# Patient Record
Sex: MALE | Race: WHITE | Employment: OTHER
[De-identification: names, ages, dates, MRNs, and addresses within clinical notes are randomized per-mention and may not be internally consistent; named-entity substitution may affect disease eponyms.]

---

## 2017-02-08 ENCOUNTER — OFFICE VISIT (OUTPATIENT)
Dept: FAMILY MEDICINE CLINIC | Facility: CLINIC | Age: 77
End: 2017-02-08

## 2017-02-08 VITALS
SYSTOLIC BLOOD PRESSURE: 120 MMHG | DIASTOLIC BLOOD PRESSURE: 68 MMHG | BODY MASS INDEX: 19.67 KG/M2 | HEART RATE: 75 BPM | WEIGHT: 145 LBS

## 2017-02-08 DIAGNOSIS — I10 ESSENTIAL HYPERTENSION: ICD-10-CM

## 2017-02-08 DIAGNOSIS — E11.9 TYPE 2 DIABETES MELLITUS WITHOUT COMPLICATION, WITHOUT LONG-TERM CURRENT USE OF INSULIN (HCC): Primary | ICD-10-CM

## 2017-02-08 PROCEDURE — 99213 OFFICE O/P EST LOW 20 MIN: CPT | Performed by: FAMILY MEDICINE

## 2017-02-08 ASSESSMENT — ENCOUNTER SYMPTOMS
COUGH: 0
CONSTIPATION: 0
ABDOMINAL PAIN: 0
BACK PAIN: 0
BLOOD IN STOOL: 0
WHEEZING: 0
DIARRHEA: 0
SHORTNESS OF BREATH: 0

## 2017-05-06 ENCOUNTER — HOSPITAL ENCOUNTER (OUTPATIENT)
Age: 77
Discharge: HOME | End: 2017-05-06
Payer: COMMERCIAL

## 2017-05-06 LAB — PROSTATE SPECIFIC ANTIGEN: 3.37 UG/L

## 2017-05-06 PROCEDURE — 84153 ASSAY OF PSA TOTAL: CPT

## 2017-05-06 PROCEDURE — 36415 COLL VENOUS BLD VENIPUNCTURE: CPT

## 2017-08-12 ENCOUNTER — HOSPITAL ENCOUNTER (OUTPATIENT)
Age: 77
Discharge: HOME | End: 2017-08-12
Payer: COMMERCIAL

## 2017-08-12 DIAGNOSIS — I10 ESSENTIAL HYPERTENSION: ICD-10-CM

## 2017-08-12 DIAGNOSIS — E11.9 TYPE 2 DIABETES MELLITUS WITHOUT COMPLICATION, WITHOUT LONG-TERM CURRENT USE OF INSULIN (HCC): ICD-10-CM

## 2017-08-12 LAB
ABSOLUTE EOS #: 0.1 K/UL (ref 0–0.4)
ABSOLUTE LYMPH #: 1.2 K/UL (ref 1–4.8)
ABSOLUTE MONO #: 0.5 K/UL (ref 0.2–0.8)
ALBUMIN SERPL-MCNC: 4.2 G/DL (ref 3.5–5.2)
ALBUMIN/GLOBULIN RATIO: ABNORMAL (ref 1–2.5)
ALP BLD-CCNC: 52 U/L (ref 40–129)
ALT SERPL-CCNC: 13 U/L (ref 5–41)
ANION GAP SERPL CALCULATED.3IONS-SCNC: 10 MMOL/L (ref 9–17)
AST SERPL-CCNC: 19 U/L
BASOPHILS # BLD: 1 %
BASOPHILS ABSOLUTE: 0 K/UL (ref 0–0.2)
BILIRUB SERPL-MCNC: 0.49 MG/DL (ref 0.3–1.2)
BUN BLDV-MCNC: 17 MG/DL (ref 8–23)
BUN/CREAT BLD: 16 (ref 9–20)
CALCIUM SERPL-MCNC: 9 MG/DL (ref 8.6–10.4)
CHLORIDE BLD-SCNC: 101 MMOL/L (ref 98–107)
CHOLESTEROL/HDL RATIO: 2.7
CHOLESTEROL: 188 MG/DL
CO2: 29 MMOL/L (ref 20–31)
CREAT SERPL-MCNC: 1.07 MG/DL (ref 0.7–1.2)
DIFFERENTIAL TYPE: NORMAL
EOSINOPHILS RELATIVE PERCENT: 1 %
GFR AFRICAN AMERICAN: >60 ML/MIN
GFR NON-AFRICAN AMERICAN: >60 ML/MIN
GFR SERPL CREATININE-BSD FRML MDRD: ABNORMAL ML/MIN/{1.73_M2}
GFR SERPL CREATININE-BSD FRML MDRD: ABNORMAL ML/MIN/{1.73_M2}
GLUCOSE BLD-MCNC: 169 MG/DL (ref 70–99)
HCT VFR BLD CALC: 45.9 % (ref 41–53)
HDLC SERPL-MCNC: 70 MG/DL
HEMOGLOBIN: 15.6 G/DL (ref 13.5–17.5)
LDL CHOLESTEROL: 95 MG/DL (ref 0–130)
LYMPHOCYTES # BLD: 20 %
MCH RBC QN AUTO: 31.4 PG (ref 26–34)
MCHC RBC AUTO-ENTMCNC: 33.9 G/DL (ref 31–37)
MCV RBC AUTO: 92.5 FL (ref 80–100)
MONOCYTES # BLD: 9 %
PDW BLD-RTO: 13.3 % (ref 11.5–14.5)
PLATELET # BLD: 237 K/UL (ref 130–400)
PLATELET ESTIMATE: NORMAL
PMV BLD AUTO: 7.9 FL (ref 6–12)
POTASSIUM SERPL-SCNC: 4.4 MMOL/L (ref 3.7–5.3)
RBC # BLD: 4.97 M/UL (ref 4.5–5.9)
RBC # BLD: NORMAL 10*6/UL
SEG NEUTROPHILS: 69 %
SEGMENTED NEUTROPHILS ABSOLUTE COUNT: 4.3 K/UL (ref 1.8–7.7)
SODIUM BLD-SCNC: 140 MMOL/L (ref 135–144)
TOTAL PROTEIN: 6.6 G/DL (ref 6.4–8.3)
TRIGL SERPL-MCNC: 117 MG/DL
VLDLC SERPL CALC-MCNC: NORMAL MG/DL (ref 1–30)
WBC # BLD: 6.2 K/UL (ref 3.5–11)
WBC # BLD: NORMAL 10*3/UL

## 2017-08-12 PROCEDURE — 80053 COMPREHEN METABOLIC PANEL: CPT

## 2017-08-12 PROCEDURE — 80061 LIPID PANEL: CPT

## 2017-08-12 PROCEDURE — 85025 COMPLETE CBC W/AUTO DIFF WBC: CPT

## 2017-08-12 PROCEDURE — 83036 HEMOGLOBIN GLYCOSYLATED A1C: CPT

## 2017-08-12 PROCEDURE — 36415 COLL VENOUS BLD VENIPUNCTURE: CPT

## 2017-08-13 LAB
ESTIMATED AVERAGE GLUCOSE: 171 MG/DL
HBA1C MFR BLD: 7.6 % (ref 4–6)

## 2017-08-18 ENCOUNTER — OFFICE VISIT (OUTPATIENT)
Dept: FAMILY MEDICINE CLINIC | Age: 77
End: 2017-08-18
Payer: COMMERCIAL

## 2017-08-18 VITALS
SYSTOLIC BLOOD PRESSURE: 120 MMHG | HEIGHT: 70 IN | BODY MASS INDEX: 20.9 KG/M2 | HEART RATE: 81 BPM | DIASTOLIC BLOOD PRESSURE: 60 MMHG | WEIGHT: 146 LBS

## 2017-08-18 DIAGNOSIS — Z12.5 PROSTATE CANCER SCREENING: ICD-10-CM

## 2017-08-18 DIAGNOSIS — I10 ESSENTIAL HYPERTENSION: ICD-10-CM

## 2017-08-18 DIAGNOSIS — E11.9 TYPE 2 DIABETES MELLITUS WITHOUT COMPLICATION, WITHOUT LONG-TERM CURRENT USE OF INSULIN (HCC): Primary | ICD-10-CM

## 2017-08-18 PROCEDURE — 99213 OFFICE O/P EST LOW 20 MIN: CPT | Performed by: FAMILY MEDICINE

## 2017-08-18 ASSESSMENT — PATIENT HEALTH QUESTIONNAIRE - PHQ9
1. LITTLE INTEREST OR PLEASURE IN DOING THINGS: 0
2. FEELING DOWN, DEPRESSED OR HOPELESS: 1
SUM OF ALL RESPONSES TO PHQ QUESTIONS 1-9: 1
SUM OF ALL RESPONSES TO PHQ9 QUESTIONS 1 & 2: 1

## 2017-08-18 ASSESSMENT — ENCOUNTER SYMPTOMS
COUGH: 0
WHEEZING: 0
ABDOMINAL PAIN: 0
SHORTNESS OF BREATH: 0
BLOOD IN STOOL: 0
BACK PAIN: 0
CONSTIPATION: 0
DIARRHEA: 0

## 2018-01-27 ENCOUNTER — HOSPITAL ENCOUNTER (OUTPATIENT)
Age: 78
Discharge: HOME | End: 2018-01-27
Payer: MEDICARE

## 2018-01-27 DIAGNOSIS — Z12.5 PROSTATE CANCER SCREENING: ICD-10-CM

## 2018-01-27 DIAGNOSIS — E11.9 TYPE 2 DIABETES MELLITUS WITHOUT COMPLICATION, WITHOUT LONG-TERM CURRENT USE OF INSULIN (HCC): ICD-10-CM

## 2018-01-27 DIAGNOSIS — I10 ESSENTIAL HYPERTENSION: ICD-10-CM

## 2018-01-27 LAB
ABSOLUTE EOS #: 0.1 K/UL (ref 0–0.4)
ABSOLUTE IMMATURE GRANULOCYTE: ABNORMAL K/UL (ref 0–0.3)
ABSOLUTE LYMPH #: 1.1 K/UL (ref 1–4.8)
ABSOLUTE MONO #: 0.6 K/UL (ref 0.2–0.8)
ALBUMIN SERPL-MCNC: 4.2 G/DL (ref 3.5–5.2)
ALBUMIN/GLOBULIN RATIO: ABNORMAL (ref 1–2.5)
ALP BLD-CCNC: 56 U/L (ref 40–129)
ALT SERPL-CCNC: 14 U/L (ref 5–41)
ANION GAP SERPL CALCULATED.3IONS-SCNC: 10 MMOL/L (ref 9–17)
AST SERPL-CCNC: 21 U/L
BASOPHILS # BLD: 1 % (ref 0–2)
BASOPHILS ABSOLUTE: 0 K/UL (ref 0–0.2)
BILIRUB SERPL-MCNC: 0.45 MG/DL (ref 0.3–1.2)
BUN BLDV-MCNC: 17 MG/DL (ref 8–23)
BUN/CREAT BLD: 17 (ref 9–20)
CALCIUM SERPL-MCNC: 8.9 MG/DL (ref 8.6–10.4)
CHLORIDE BLD-SCNC: 101 MMOL/L (ref 98–107)
CHOLESTEROL/HDL RATIO: 2.5
CHOLESTEROL: 173 MG/DL
CO2: 28 MMOL/L (ref 20–31)
CREAT SERPL-MCNC: 0.99 MG/DL (ref 0.7–1.2)
DIFFERENTIAL TYPE: ABNORMAL
EOSINOPHILS RELATIVE PERCENT: 1 % (ref 1–4)
GFR AFRICAN AMERICAN: >60 ML/MIN
GFR NON-AFRICAN AMERICAN: >60 ML/MIN
GFR SERPL CREATININE-BSD FRML MDRD: ABNORMAL ML/MIN/{1.73_M2}
GFR SERPL CREATININE-BSD FRML MDRD: ABNORMAL ML/MIN/{1.73_M2}
GLUCOSE BLD-MCNC: 167 MG/DL (ref 70–99)
HCT VFR BLD CALC: 45.1 % (ref 41–53)
HDLC SERPL-MCNC: 69 MG/DL
HEMOGLOBIN: 14.9 G/DL (ref 13.5–17.5)
IMMATURE GRANULOCYTES: ABNORMAL %
LDL CHOLESTEROL: 84 MG/DL (ref 0–130)
LYMPHOCYTES # BLD: 17 % (ref 24–44)
MCH RBC QN AUTO: 31 PG (ref 26–34)
MCHC RBC AUTO-ENTMCNC: 33.1 G/DL (ref 31–37)
MCV RBC AUTO: 93.8 FL (ref 80–100)
MONOCYTES # BLD: 10 % (ref 1–7)
NRBC AUTOMATED: ABNORMAL PER 100 WBC
PDW BLD-RTO: 13.5 % (ref 11.5–14.5)
PLATELET # BLD: 264 K/UL (ref 130–400)
PLATELET ESTIMATE: ABNORMAL
PMV BLD AUTO: 7.9 FL (ref 6–12)
POTASSIUM SERPL-SCNC: 4.6 MMOL/L (ref 3.7–5.3)
PROSTATE SPECIFIC ANTIGEN: 3.49 UG/L
RBC # BLD: 4.81 M/UL (ref 4.5–5.9)
RBC # BLD: ABNORMAL 10*6/UL
SEG NEUTROPHILS: 71 % (ref 36–66)
SEGMENTED NEUTROPHILS ABSOLUTE COUNT: 4.6 K/UL (ref 1.8–7.7)
SODIUM BLD-SCNC: 139 MMOL/L (ref 135–144)
TOTAL PROTEIN: 6.6 G/DL (ref 6.4–8.3)
TRIGL SERPL-MCNC: 101 MG/DL
VLDLC SERPL CALC-MCNC: NORMAL MG/DL (ref 1–30)
WBC # BLD: 6.4 K/UL (ref 3.5–11)
WBC # BLD: ABNORMAL 10*3/UL

## 2018-01-27 PROCEDURE — 80061 LIPID PANEL: CPT

## 2018-01-27 PROCEDURE — 80053 COMPREHEN METABOLIC PANEL: CPT

## 2018-01-27 PROCEDURE — 83036 HEMOGLOBIN GLYCOSYLATED A1C: CPT

## 2018-01-27 PROCEDURE — 85025 COMPLETE CBC W/AUTO DIFF WBC: CPT

## 2018-01-27 PROCEDURE — 36415 COLL VENOUS BLD VENIPUNCTURE: CPT

## 2018-01-27 PROCEDURE — G0103 PSA SCREENING: HCPCS

## 2018-01-28 LAB
ESTIMATED AVERAGE GLUCOSE: 174 MG/DL
HBA1C MFR BLD: 7.7 % (ref 4–6)

## 2018-02-15 ENCOUNTER — OFFICE VISIT (OUTPATIENT)
Dept: FAMILY MEDICINE CLINIC | Age: 78
End: 2018-02-15
Payer: MEDICARE

## 2018-02-15 VITALS
BODY MASS INDEX: 21.29 KG/M2 | HEART RATE: 72 BPM | WEIGHT: 148.4 LBS | SYSTOLIC BLOOD PRESSURE: 114 MMHG | DIASTOLIC BLOOD PRESSURE: 76 MMHG

## 2018-02-15 DIAGNOSIS — I10 ESSENTIAL HYPERTENSION: ICD-10-CM

## 2018-02-15 DIAGNOSIS — Z23 IMMUNIZATION DUE: ICD-10-CM

## 2018-02-15 DIAGNOSIS — E11.9 TYPE 2 DIABETES MELLITUS WITHOUT COMPLICATION, WITHOUT LONG-TERM CURRENT USE OF INSULIN (HCC): Primary | ICD-10-CM

## 2018-02-15 PROCEDURE — 3288F FALL RISK ASSESSMENT DOCD: CPT | Performed by: FAMILY MEDICINE

## 2018-02-15 PROCEDURE — 99213 OFFICE O/P EST LOW 20 MIN: CPT | Performed by: FAMILY MEDICINE

## 2018-02-15 PROCEDURE — 90688 IIV4 VACCINE SPLT 0.5 ML IM: CPT | Performed by: FAMILY MEDICINE

## 2018-02-15 PROCEDURE — G0008 ADMIN INFLUENZA VIRUS VAC: HCPCS | Performed by: FAMILY MEDICINE

## 2018-02-15 ASSESSMENT — ENCOUNTER SYMPTOMS
CONSTIPATION: 0
ABDOMINAL PAIN: 0
WHEEZING: 0
DIARRHEA: 0
BACK PAIN: 0
COUGH: 0
SHORTNESS OF BREATH: 0
BLOOD IN STOOL: 0

## 2018-06-16 DIAGNOSIS — E11.9 TYPE 2 DIABETES MELLITUS WITHOUT COMPLICATION, WITHOUT LONG-TERM CURRENT USE OF INSULIN (HCC): ICD-10-CM

## 2018-08-11 ENCOUNTER — HOSPITAL ENCOUNTER (OUTPATIENT)
Age: 78
Discharge: HOME | End: 2018-08-11
Payer: MEDICARE

## 2018-08-11 DIAGNOSIS — E11.9 TYPE 2 DIABETES MELLITUS WITHOUT COMPLICATION, WITHOUT LONG-TERM CURRENT USE OF INSULIN (HCC): ICD-10-CM

## 2018-08-11 DIAGNOSIS — I10 ESSENTIAL HYPERTENSION: ICD-10-CM

## 2018-08-11 LAB
ABSOLUTE EOS #: 0 K/UL (ref 0–0.4)
ABSOLUTE IMMATURE GRANULOCYTE: ABNORMAL K/UL (ref 0–0.3)
ABSOLUTE LYMPH #: 1 K/UL (ref 1–4.8)
ABSOLUTE MONO #: 0.5 K/UL (ref 0.2–0.8)
ALBUMIN SERPL-MCNC: 4.1 G/DL (ref 3.5–5.2)
ALBUMIN/GLOBULIN RATIO: ABNORMAL (ref 1–2.5)
ALP BLD-CCNC: 57 U/L (ref 40–129)
ALT SERPL-CCNC: 14 U/L (ref 5–41)
ANION GAP SERPL CALCULATED.3IONS-SCNC: 10 MMOL/L (ref 9–17)
AST SERPL-CCNC: 20 U/L
BASOPHILS # BLD: 1 % (ref 0–2)
BASOPHILS ABSOLUTE: 0.1 K/UL (ref 0–0.2)
BILIRUB SERPL-MCNC: 0.42 MG/DL (ref 0.3–1.2)
BUN BLDV-MCNC: 16 MG/DL (ref 8–23)
BUN/CREAT BLD: 17 (ref 9–20)
CALCIUM SERPL-MCNC: 8.8 MG/DL (ref 8.6–10.4)
CHLORIDE BLD-SCNC: 101 MMOL/L (ref 98–107)
CHOLESTEROL/HDL RATIO: 2.9
CHOLESTEROL: 182 MG/DL
CO2: 27 MMOL/L (ref 20–31)
CREAT SERPL-MCNC: 0.96 MG/DL (ref 0.7–1.2)
DIFFERENTIAL TYPE: ABNORMAL
EOSINOPHILS RELATIVE PERCENT: 1 % (ref 1–4)
GFR AFRICAN AMERICAN: >60 ML/MIN
GFR NON-AFRICAN AMERICAN: >60 ML/MIN
GFR SERPL CREATININE-BSD FRML MDRD: ABNORMAL ML/MIN/{1.73_M2}
GFR SERPL CREATININE-BSD FRML MDRD: ABNORMAL ML/MIN/{1.73_M2}
GLUCOSE BLD-MCNC: 155 MG/DL (ref 70–99)
HCT VFR BLD CALC: 46 % (ref 41–53)
HDLC SERPL-MCNC: 62 MG/DL
HEMOGLOBIN: 15.2 G/DL (ref 13.5–17.5)
IMMATURE GRANULOCYTES: ABNORMAL %
LDL CHOLESTEROL: 99 MG/DL (ref 0–130)
LYMPHOCYTES # BLD: 17 % (ref 24–44)
MCH RBC QN AUTO: 31.2 PG (ref 26–34)
MCHC RBC AUTO-ENTMCNC: 33 G/DL (ref 31–37)
MCV RBC AUTO: 94.4 FL (ref 80–100)
MONOCYTES # BLD: 8 % (ref 1–7)
NRBC AUTOMATED: ABNORMAL PER 100 WBC
PDW BLD-RTO: 13.3 % (ref 11.5–14.5)
PLATELET # BLD: 265 K/UL (ref 130–400)
PLATELET ESTIMATE: ABNORMAL
PMV BLD AUTO: 7.7 FL (ref 6–12)
POTASSIUM SERPL-SCNC: 4.3 MMOL/L (ref 3.7–5.3)
RBC # BLD: 4.88 M/UL (ref 4.5–5.9)
RBC # BLD: ABNORMAL 10*6/UL
SEG NEUTROPHILS: 73 % (ref 36–66)
SEGMENTED NEUTROPHILS ABSOLUTE COUNT: 4.2 K/UL (ref 1.8–7.7)
SODIUM BLD-SCNC: 138 MMOL/L (ref 135–144)
TOTAL PROTEIN: 6.4 G/DL (ref 6.4–8.3)
TRIGL SERPL-MCNC: 103 MG/DL
VLDLC SERPL CALC-MCNC: NORMAL MG/DL (ref 1–30)
WBC # BLD: 5.8 K/UL (ref 3.5–11)
WBC # BLD: ABNORMAL 10*3/UL

## 2018-08-11 PROCEDURE — 83036 HEMOGLOBIN GLYCOSYLATED A1C: CPT

## 2018-08-11 PROCEDURE — 36415 COLL VENOUS BLD VENIPUNCTURE: CPT

## 2018-08-11 PROCEDURE — 80053 COMPREHEN METABOLIC PANEL: CPT

## 2018-08-11 PROCEDURE — 85025 COMPLETE CBC W/AUTO DIFF WBC: CPT

## 2018-08-11 PROCEDURE — 80061 LIPID PANEL: CPT

## 2018-08-12 LAB
ESTIMATED AVERAGE GLUCOSE: 169 MG/DL
HBA1C MFR BLD: 7.5 % (ref 4–6)

## 2018-08-16 ENCOUNTER — OFFICE VISIT (OUTPATIENT)
Dept: FAMILY MEDICINE CLINIC | Age: 78
End: 2018-08-16
Payer: MEDICARE

## 2018-08-16 VITALS
BODY MASS INDEX: 20.37 KG/M2 | WEIGHT: 142 LBS | SYSTOLIC BLOOD PRESSURE: 136 MMHG | HEART RATE: 76 BPM | DIASTOLIC BLOOD PRESSURE: 70 MMHG

## 2018-08-16 DIAGNOSIS — Z12.5 PROSTATE CANCER SCREENING: ICD-10-CM

## 2018-08-16 DIAGNOSIS — E11.9 TYPE 2 DIABETES MELLITUS WITHOUT COMPLICATION, WITHOUT LONG-TERM CURRENT USE OF INSULIN (HCC): Primary | ICD-10-CM

## 2018-08-16 DIAGNOSIS — I10 ESSENTIAL HYPERTENSION: ICD-10-CM

## 2018-08-16 PROCEDURE — 99213 OFFICE O/P EST LOW 20 MIN: CPT | Performed by: FAMILY MEDICINE

## 2018-08-16 ASSESSMENT — ENCOUNTER SYMPTOMS
DIARRHEA: 0
WHEEZING: 0
COUGH: 0
BACK PAIN: 0
CONSTIPATION: 0
BLOOD IN STOOL: 0
SHORTNESS OF BREATH: 0
ABDOMINAL PAIN: 0

## 2018-08-16 NOTE — PROGRESS NOTES
Armaan Heard is a 70 y.o. male who presents today for his medical conditions/complaints as noted below. Armaan Heard is c/o of Diabetes; Health Maintenance (pneumonia); and Discuss Labs  Routine follow up on PL his labs remain stable. Has had some pain in his left hip but this pain is better today. He continues to smoke. HPI:     Diabetic visit information    BP Readings from Last 3 Encounters:   08/16/18 136/70   02/15/18 114/76   08/18/17 120/60       Hemoglobin A1C (%)   Date Value   08/11/2018 7.5 (H)   01/27/2018 7.7 (H)   08/12/2017 7.6 (H)     LDL Cholesterol (mg/dL)   Date Value   08/11/2018 99               Have you changed or started any medications since your last visit including any over-the-counter medicines, vitamins, or herbal medicines? no   Have you stopped taking any of your medications? Is so, why? -  no  Are you having any side effects from any of your medications? - no    Have you seen any other physician or provider since your last visit?  no   Have you had any other diagnostic tests since your last visit?  no   Have you been seen in the emergency room and/or had an admission in a hospital since we last saw you?  no     Have you had your annual diabetic retinal (eye) exam? No   (ensure copy of exam is in the chart)    Have you had your routine dental cleaning in the past 6 months? no    Do you have an active MyChart account? If not, what are your barriers? No:     Patient Care Team:  Eddi Johns MD as PCP - General (Family Medicine)  Eddi Johns MD as PCP - S Attributed Provider    Medical history Review  Past Medical, Family, and Social History reviewed and  contribute to the patient presenting condition.     Health Maintenance   Topic Date Due    DTaP/Tdap/Td vaccine (1 - Tdap) 01/17/1961    Shingles Vaccine (1 of 2 - 2 Dose Series) 01/17/1992    Pneumococcal low/med risk (1 of 2 - PCV13) 01/17/2007    Flu vaccine (1) 09/01/2018    Potassium monitoring 08/11/2019    Creatinine monitoring  08/11/2019       No past medical history on file. Past Surgical History:   Procedure Laterality Date    EYE SURGERY       Family History   Problem Relation Age of Onset    High Blood Pressure Mother     Heart Disease Mother     Kidney Disease Mother     Other Sister         obesity     Social History   Substance Use Topics    Smoking status: Current Every Day Smoker     Types: Cigarettes    Smokeless tobacco: Never Used    Alcohol use No      Current Outpatient Prescriptions   Medication Sig Dispense Refill    metFORMIN (GLUCOPHAGE) 500 MG tablet Take 1 tablet by mouth 2 times daily (with meals) 60 tablet 11    metFORMIN (GLUCOPHAGE) 500 MG tablet TAKE ONE TABLET BY MOUTH TWICE A DAY WITH MEALS 180 tablet 10    latanoprost (XALATAN) 0.005 % ophthalmic solution Administer 1 drop to the right eye nightly. No current facility-administered medications for this visit. No Known Allergies      Subjective:   Review of Systems   Constitutional: Negative for chills, diaphoresis, fatigue and fever. HENT: Negative for congestion and hearing loss. Eyes: Positive for visual disturbance. Respiratory: Negative for cough, shortness of breath and wheezing. Cardiovascular: Negative for chest pain, palpitations and leg swelling. Gastrointestinal: Negative for abdominal pain, blood in stool, constipation and diarrhea. Genitourinary: Negative for dysuria. Musculoskeletal: Positive for arthralgias and gait problem. Negative for back pain and neck pain. Skin: Negative for rash. Neurological: Negative for weakness, numbness and headaches. Psychiatric/Behavioral: Negative for dysphoric mood and sleep disturbance. Objective:   /70   Pulse 76   Wt 142 lb (64.4 kg)   BMI 20.37 kg/m²     Physical Exam   Constitutional: He is oriented to person, place, and time. He appears well-developed and well-nourished. No distress.    HENT:   Head: Normocephalic and atraumatic. Mouth/Throat: No oropharyngeal exudate. Eyes: Right eye exhibits no discharge. Left eye exhibits no discharge. No scleral icterus. Neck: Neck supple. Carotid bruit is not present. No thyromegaly present. Cardiovascular: Normal rate, regular rhythm and normal heart sounds. Exam reveals no gallop and no friction rub. No murmur heard. Pulmonary/Chest: Breath sounds normal. No respiratory distress. He has no wheezes. He has no rales. He exhibits no tenderness. Abdominal: There is no tenderness. Musculoskeletal: He exhibits no edema or tenderness. Lymphadenopathy:     He has no cervical adenopathy. Neurological: He is alert and oriented to person, place, and time. No cranial nerve deficit. Coordination normal.   Skin: No rash noted. He is not diaphoretic. Psychiatric: He has a normal mood and affect. His behavior is normal. Judgment and thought content normal.       Assessment:       Diagnosis Orders   1. Type 2 diabetes mellitus without complication, without long-term current use of insulin ([facility])  Comprehensive Metabolic Panel    Lipid Panel    Hemoglobin A1C    metFORMIN (GLUCOPHAGE) 500 MG tablet   2. Essential hypertension  CBC Auto Differential    Comprehensive Metabolic Panel    Lipid Panel   3. Prostate cancer screening  Psa screening         Plan:      No Follow-up on file.     Orders Placed This Encounter   Procedures    CBC Auto Differential     Standing Status:   Future     Standing Expiration Date:   8/16/2019    Comprehensive Metabolic Panel     Standing Status:   Future     Standing Expiration Date:   8/16/2019    Lipid Panel     Standing Status:   Future     Standing Expiration Date:   8/16/2019     Order Specific Question:   Is Patient Fasting?/# of Hours     Answer:   12 hour fast    Hemoglobin A1C     Standing Status:   Future     Standing Expiration Date:   8/16/2019    Psa screening     Standing Status:   Future     Standing Expiration Date:   8/16/2019

## 2019-02-21 ENCOUNTER — HOSPITAL ENCOUNTER (OUTPATIENT)
Age: 79
Discharge: HOME | End: 2019-02-21
Payer: MEDICARE

## 2019-02-21 DIAGNOSIS — Z12.5 PROSTATE CANCER SCREENING: ICD-10-CM

## 2019-02-21 DIAGNOSIS — I10 ESSENTIAL HYPERTENSION: ICD-10-CM

## 2019-02-21 DIAGNOSIS — E11.9 TYPE 2 DIABETES MELLITUS WITHOUT COMPLICATION, WITHOUT LONG-TERM CURRENT USE OF INSULIN (HCC): ICD-10-CM

## 2019-02-21 LAB
ABSOLUTE EOS #: <0.03 K/UL (ref 0–0.44)
ABSOLUTE IMMATURE GRANULOCYTE: 0.04 K/UL (ref 0–0.3)
ABSOLUTE LYMPH #: 1.01 K/UL (ref 1.1–3.7)
ABSOLUTE MONO #: 0.91 K/UL (ref 0.1–1.2)
ALBUMIN SERPL-MCNC: 3.7 G/DL (ref 3.5–5.2)
ALBUMIN/GLOBULIN RATIO: 1.5 (ref 1–2.5)
ALP BLD-CCNC: 70 U/L (ref 40–129)
ALT SERPL-CCNC: 36 U/L (ref 5–41)
ANION GAP SERPL CALCULATED.3IONS-SCNC: 12 MMOL/L (ref 9–17)
AST SERPL-CCNC: 34 U/L
BASOPHILS # BLD: 1 % (ref 0–2)
BASOPHILS ABSOLUTE: 0.04 K/UL (ref 0–0.2)
BILIRUB SERPL-MCNC: 0.81 MG/DL (ref 0.3–1.2)
BUN BLDV-MCNC: 24 MG/DL (ref 8–23)
BUN/CREAT BLD: ABNORMAL (ref 9–20)
CALCIUM SERPL-MCNC: 8.9 MG/DL (ref 8.6–10.4)
CHLORIDE BLD-SCNC: 103 MMOL/L (ref 98–107)
CHOLESTEROL/HDL RATIO: 2.3
CHOLESTEROL: 140 MG/DL
CO2: 25 MMOL/L (ref 20–31)
CREAT SERPL-MCNC: 0.8 MG/DL (ref 0.7–1.2)
DIFFERENTIAL TYPE: ABNORMAL
EOSINOPHILS RELATIVE PERCENT: 0 % (ref 1–4)
ESTIMATED AVERAGE GLUCOSE: 169 MG/DL
GFR AFRICAN AMERICAN: >60 ML/MIN
GFR NON-AFRICAN AMERICAN: >60 ML/MIN
GFR SERPL CREATININE-BSD FRML MDRD: ABNORMAL ML/MIN/{1.73_M2}
GFR SERPL CREATININE-BSD FRML MDRD: ABNORMAL ML/MIN/{1.73_M2}
GLUCOSE BLD-MCNC: 183 MG/DL (ref 70–99)
HBA1C MFR BLD: 7.5 % (ref 4–6)
HCT VFR BLD CALC: 38 % (ref 40.7–50.3)
HDLC SERPL-MCNC: 60 MG/DL
HEMOGLOBIN: 12.6 G/DL (ref 13–17)
IMMATURE GRANULOCYTES: 1 %
LDL CHOLESTEROL: 66 MG/DL (ref 0–130)
LYMPHOCYTES # BLD: 12 % (ref 24–43)
MCH RBC QN AUTO: 31.7 PG (ref 25.2–33.5)
MCHC RBC AUTO-ENTMCNC: 33.2 G/DL (ref 28.4–34.8)
MCV RBC AUTO: 95.5 FL (ref 82.6–102.9)
MONOCYTES # BLD: 11 % (ref 3–12)
NRBC AUTOMATED: 0 PER 100 WBC
PDW BLD-RTO: 12.9 % (ref 11.8–14.4)
PLATELET # BLD: 271 K/UL (ref 138–453)
PLATELET ESTIMATE: ABNORMAL
PMV BLD AUTO: 10.3 FL (ref 8.1–13.5)
POTASSIUM SERPL-SCNC: 4.1 MMOL/L (ref 3.7–5.3)
PROSTATE SPECIFIC ANTIGEN: 9.11 UG/L
RBC # BLD: 3.98 M/UL (ref 4.21–5.77)
RBC # BLD: ABNORMAL 10*6/UL
SEG NEUTROPHILS: 75 % (ref 36–65)
SEGMENTED NEUTROPHILS ABSOLUTE COUNT: 6.3 K/UL (ref 1.5–8.1)
SODIUM BLD-SCNC: 140 MMOL/L (ref 135–144)
TOTAL PROTEIN: 6.1 G/DL (ref 6.4–8.3)
TRIGL SERPL-MCNC: 69 MG/DL
VLDLC SERPL CALC-MCNC: NORMAL MG/DL (ref 1–30)
WBC # BLD: 8.3 K/UL (ref 3.5–11.3)
WBC # BLD: ABNORMAL 10*3/UL

## 2019-02-21 PROCEDURE — 85025 COMPLETE CBC W/AUTO DIFF WBC: CPT

## 2019-02-21 PROCEDURE — 80053 COMPREHEN METABOLIC PANEL: CPT

## 2019-02-21 PROCEDURE — 36415 COLL VENOUS BLD VENIPUNCTURE: CPT

## 2019-02-21 PROCEDURE — G0103 PSA SCREENING: HCPCS

## 2019-02-21 PROCEDURE — 83036 HEMOGLOBIN GLYCOSYLATED A1C: CPT

## 2019-02-21 PROCEDURE — 80061 LIPID PANEL: CPT

## 2019-03-06 ENCOUNTER — OFFICE VISIT (OUTPATIENT)
Dept: FAMILY MEDICINE CLINIC | Age: 79
End: 2019-03-06
Payer: MEDICARE

## 2019-03-06 VITALS
SYSTOLIC BLOOD PRESSURE: 120 MMHG | WEIGHT: 133 LBS | HEART RATE: 87 BPM | BODY MASS INDEX: 19.08 KG/M2 | OXYGEN SATURATION: 96 % | DIASTOLIC BLOOD PRESSURE: 70 MMHG

## 2019-03-06 DIAGNOSIS — E11.9 TYPE 2 DIABETES MELLITUS WITHOUT COMPLICATION, WITHOUT LONG-TERM CURRENT USE OF INSULIN (HCC): ICD-10-CM

## 2019-03-06 DIAGNOSIS — R97.20 ELEVATED PSA: Primary | ICD-10-CM

## 2019-03-06 PROCEDURE — 99213 OFFICE O/P EST LOW 20 MIN: CPT | Performed by: FAMILY MEDICINE

## 2019-03-06 ASSESSMENT — ENCOUNTER SYMPTOMS
WHEEZING: 0
CONSTIPATION: 0
BLOOD IN STOOL: 0
ABDOMINAL PAIN: 0
DIARRHEA: 0
COUGH: 0
SHORTNESS OF BREATH: 0
BACK PAIN: 0

## 2019-03-06 ASSESSMENT — PATIENT HEALTH QUESTIONNAIRE - PHQ9
2. FEELING DOWN, DEPRESSED OR HOPELESS: 1
SUM OF ALL RESPONSES TO PHQ QUESTIONS 1-9: 2
SUM OF ALL RESPONSES TO PHQ QUESTIONS 1-9: 2
SUM OF ALL RESPONSES TO PHQ9 QUESTIONS 1 & 2: 2
1. LITTLE INTEREST OR PLEASURE IN DOING THINGS: 1

## 2019-05-24 ENCOUNTER — HOSPITAL ENCOUNTER (OUTPATIENT)
Age: 79
Discharge: HOME | End: 2019-05-24
Payer: MEDICARE

## 2019-05-24 LAB — PROSTATE SPECIFIC ANTIGEN: 5.01 UG/L

## 2019-05-24 PROCEDURE — 84153 ASSAY OF PSA TOTAL: CPT

## 2019-05-24 PROCEDURE — 36415 COLL VENOUS BLD VENIPUNCTURE: CPT

## 2019-07-17 DIAGNOSIS — E11.9 TYPE 2 DIABETES MELLITUS WITHOUT COMPLICATION, WITHOUT LONG-TERM CURRENT USE OF INSULIN (HCC): ICD-10-CM

## 2019-09-13 ENCOUNTER — HOSPITAL ENCOUNTER (OUTPATIENT)
Age: 79
Discharge: HOME | End: 2019-09-13
Payer: MEDICARE

## 2019-09-13 DIAGNOSIS — E11.9 TYPE 2 DIABETES MELLITUS WITHOUT COMPLICATION, WITHOUT LONG-TERM CURRENT USE OF INSULIN (HCC): ICD-10-CM

## 2019-09-13 LAB
ABSOLUTE EOS #: 0.25 K/UL (ref 0–0.44)
ABSOLUTE IMMATURE GRANULOCYTE: 0.04 K/UL (ref 0–0.3)
ABSOLUTE LYMPH #: 1.14 K/UL (ref 1.1–3.7)
ABSOLUTE MONO #: 0.64 K/UL (ref 0.1–1.2)
ALBUMIN SERPL-MCNC: 4.1 G/DL (ref 3.5–5.2)
ALBUMIN/GLOBULIN RATIO: 1.8 (ref 1–2.5)
ALP BLD-CCNC: 56 U/L (ref 40–129)
ALT SERPL-CCNC: 13 U/L (ref 5–41)
ANION GAP SERPL CALCULATED.3IONS-SCNC: 11 MMOL/L (ref 9–17)
AST SERPL-CCNC: 19 U/L
BASOPHILS # BLD: 1 % (ref 0–2)
BASOPHILS ABSOLUTE: 0.06 K/UL (ref 0–0.2)
BILIRUB SERPL-MCNC: 0.45 MG/DL (ref 0.3–1.2)
BUN BLDV-MCNC: 26 MG/DL (ref 8–23)
BUN/CREAT BLD: ABNORMAL (ref 9–20)
CALCIUM SERPL-MCNC: 8.9 MG/DL (ref 8.6–10.4)
CHLORIDE BLD-SCNC: 102 MMOL/L (ref 98–107)
CHOLESTEROL/HDL RATIO: 2.6
CHOLESTEROL: 181 MG/DL
CO2: 26 MMOL/L (ref 20–31)
CREAT SERPL-MCNC: 0.88 MG/DL (ref 0.7–1.2)
DIFFERENTIAL TYPE: ABNORMAL
EOSINOPHILS RELATIVE PERCENT: 4 % (ref 1–4)
ESTIMATED AVERAGE GLUCOSE: 163 MG/DL
GFR AFRICAN AMERICAN: >60 ML/MIN
GFR NON-AFRICAN AMERICAN: >60 ML/MIN
GFR SERPL CREATININE-BSD FRML MDRD: ABNORMAL ML/MIN/{1.73_M2}
GFR SERPL CREATININE-BSD FRML MDRD: ABNORMAL ML/MIN/{1.73_M2}
GLUCOSE BLD-MCNC: 155 MG/DL (ref 70–99)
HBA1C MFR BLD: 7.3 % (ref 4–6)
HCT VFR BLD CALC: 42.9 % (ref 40.7–50.3)
HDLC SERPL-MCNC: 69 MG/DL
HEMOGLOBIN: 13.6 G/DL (ref 13–17)
IMMATURE GRANULOCYTES: 1 %
LDL CHOLESTEROL: 82 MG/DL (ref 0–130)
LYMPHOCYTES # BLD: 19 % (ref 24–43)
MCH RBC QN AUTO: 31.1 PG (ref 25.2–33.5)
MCHC RBC AUTO-ENTMCNC: 31.7 G/DL (ref 28.4–34.8)
MCV RBC AUTO: 97.9 FL (ref 82.6–102.9)
MONOCYTES # BLD: 11 % (ref 3–12)
NRBC AUTOMATED: 0 PER 100 WBC
PDW BLD-RTO: 13.2 % (ref 11.8–14.4)
PLATELET # BLD: 231 K/UL (ref 138–453)
PLATELET ESTIMATE: ABNORMAL
PMV BLD AUTO: 10.3 FL (ref 8.1–13.5)
POTASSIUM SERPL-SCNC: 4.2 MMOL/L (ref 3.7–5.3)
RBC # BLD: 4.38 M/UL (ref 4.21–5.77)
RBC # BLD: ABNORMAL 10*6/UL
SEG NEUTROPHILS: 64 % (ref 36–65)
SEGMENTED NEUTROPHILS ABSOLUTE COUNT: 3.86 K/UL (ref 1.5–8.1)
SODIUM BLD-SCNC: 139 MMOL/L (ref 135–144)
TOTAL PROTEIN: 6.4 G/DL (ref 6.4–8.3)
TRIGL SERPL-MCNC: 149 MG/DL
VLDLC SERPL CALC-MCNC: NORMAL MG/DL (ref 1–30)
WBC # BLD: 6 K/UL (ref 3.5–11.3)
WBC # BLD: ABNORMAL 10*3/UL

## 2019-09-13 PROCEDURE — 85025 COMPLETE CBC W/AUTO DIFF WBC: CPT

## 2019-09-13 PROCEDURE — 36415 COLL VENOUS BLD VENIPUNCTURE: CPT

## 2019-09-13 PROCEDURE — 80061 LIPID PANEL: CPT

## 2019-09-13 PROCEDURE — 83036 HEMOGLOBIN GLYCOSYLATED A1C: CPT

## 2019-09-13 PROCEDURE — 80053 COMPREHEN METABOLIC PANEL: CPT

## 2019-09-25 ENCOUNTER — OFFICE VISIT (OUTPATIENT)
Dept: FAMILY MEDICINE CLINIC | Age: 79
End: 2019-09-25
Payer: MEDICARE

## 2019-09-25 VITALS
HEART RATE: 76 BPM | SYSTOLIC BLOOD PRESSURE: 112 MMHG | OXYGEN SATURATION: 96 % | HEIGHT: 70 IN | WEIGHT: 133 LBS | DIASTOLIC BLOOD PRESSURE: 70 MMHG | BODY MASS INDEX: 19.04 KG/M2

## 2019-09-25 DIAGNOSIS — E11.9 TYPE 2 DIABETES MELLITUS WITHOUT COMPLICATION, WITHOUT LONG-TERM CURRENT USE OF INSULIN (HCC): ICD-10-CM

## 2019-09-25 DIAGNOSIS — Z12.5 PROSTATE CANCER SCREENING: ICD-10-CM

## 2019-09-25 DIAGNOSIS — I10 ESSENTIAL HYPERTENSION: Primary | ICD-10-CM

## 2019-09-25 PROCEDURE — 99213 OFFICE O/P EST LOW 20 MIN: CPT | Performed by: FAMILY MEDICINE

## 2019-09-25 PROCEDURE — G0008 ADMIN INFLUENZA VIRUS VAC: HCPCS | Performed by: FAMILY MEDICINE

## 2019-09-25 PROCEDURE — 90688 IIV4 VACCINE SPLT 0.5 ML IM: CPT | Performed by: FAMILY MEDICINE

## 2019-09-25 RX ORDER — TAMSULOSIN HYDROCHLORIDE 0.4 MG/1
0.4 CAPSULE ORAL DAILY
COMMUNITY
End: 2021-08-27 | Stop reason: SDUPTHER

## 2019-09-25 ASSESSMENT — ENCOUNTER SYMPTOMS
ABDOMINAL PAIN: 0
COUGH: 0
SHORTNESS OF BREATH: 0
CONSTIPATION: 0
BLOOD IN STOOL: 0
BACK PAIN: 0
DIARRHEA: 0
WHEEZING: 0

## 2019-09-25 NOTE — PROGRESS NOTES
Negative for dysphoric mood and sleep disturbance.       :   /70   Pulse 76   Ht 5' 10\" (1.778 m)   Wt 133 lb (60.3 kg)   SpO2 96%   BMI 19.08 kg/m²     Physical Exam   Constitutional: He is oriented to person, place, and time. He appears well-developed and well-nourished. No distress. HENT:   Head: Normocephalic and atraumatic. Mouth/Throat: No oropharyngeal exudate. Eyes: Right eye exhibits no discharge. Left eye exhibits no discharge. No scleral icterus. Neck: Neck supple. Carotid bruit is not present. No thyromegaly present. Cardiovascular: Normal rate, regular rhythm and normal heart sounds. Exam reveals no gallop and no friction rub. No murmur heard. Pulmonary/Chest: Breath sounds normal. No respiratory distress. He has no wheezes. He has no rales. He exhibits no tenderness. Abdominal: There is no tenderness. Musculoskeletal: He exhibits no edema or tenderness. Lymphadenopathy:     He has no cervical adenopathy. Neurological: He is alert and oriented to person, place, and time. No cranial nerve deficit. Coordination normal.   Skin: No rash noted. He is not diaphoretic. Psychiatric: He has a normal mood and affect. His behavior is normal. Judgment and thought content normal.       Assessment:       Diagnosis Orders   1. Essential hypertension  CBC Auto Differential    Comprehensive Metabolic Panel    Lipid Panel   2. Type 2 diabetes mellitus without complication, without long-term current use of insulin (HCC)  metFORMIN (GLUCOPHAGE) 500 MG tablet    Comprehensive Metabolic Panel    Lipid Panel    Hemoglobin A1C   3. Prostate cancer screening  Psa screening         Plan:      Return in about 6 months (around 3/25/2020).     Orders Placed This Encounter   Procedures    INFLUENZA, QUADV, 3 YRS AND OLDER, IM, MDV, 0.5ML (AFLURIA QUADV)    CBC Auto Differential     Standing Status:   Future     Standing Expiration Date:   9/25/2020    Comprehensive Metabolic Panel     Standing Status:   Future     Standing Expiration Date:   9/25/2020    Lipid Panel     Standing Status:   Future     Standing Expiration Date:   9/25/2020     Order Specific Question:   Is Patient Fasting?/# of Hours     Answer:   12 hour fast    Hemoglobin A1C     Standing Status:   Future     Standing Expiration Date:   9/25/2020    Psa screening     Standing Status:   Future     Standing Expiration Date:   9/25/2020     Orders Placed This Encounter   Medications    metFORMIN (GLUCOPHAGE) 500 MG tablet     Sig: TAKE ONE TABLET BY MOUTH TWICE A DAY WITH MEALS     Dispense:  180 tablet     Refill:  3     Labs in six months his psa dropped from 9 to 5 and he has appt in December may cancel this March draw if not needed.

## 2020-06-23 ENCOUNTER — TELEPHONE (OUTPATIENT)
Dept: FAMILY MEDICINE CLINIC | Age: 80
End: 2020-06-23

## 2020-06-23 NOTE — TELEPHONE ENCOUNTER
Toney Agustin was contacted to set up an annual wellness visit    Spoke with: left message to schedule annemarie Olivares

## 2020-10-12 ENCOUNTER — HOSPITAL ENCOUNTER (OUTPATIENT)
Age: 80
Discharge: HOME | End: 2020-10-12
Payer: MEDICARE

## 2020-10-12 LAB
ABSOLUTE EOS #: 0.06 K/UL (ref 0–0.44)
ABSOLUTE IMMATURE GRANULOCYTE: <0.03 K/UL (ref 0–0.3)
ABSOLUTE LYMPH #: 1.16 K/UL (ref 1.1–3.7)
ABSOLUTE MONO #: 0.55 K/UL (ref 0.1–1.2)
ALBUMIN SERPL-MCNC: 4.1 G/DL (ref 3.5–5.2)
ALBUMIN/GLOBULIN RATIO: 2 (ref 1–2.5)
ALP BLD-CCNC: 50 U/L (ref 40–129)
ALT SERPL-CCNC: 14 U/L (ref 5–41)
ANION GAP SERPL CALCULATED.3IONS-SCNC: 10 MMOL/L (ref 9–17)
AST SERPL-CCNC: 18 U/L
BASOPHILS # BLD: 2 % (ref 0–2)
BASOPHILS ABSOLUTE: 0.08 K/UL (ref 0–0.2)
BILIRUB SERPL-MCNC: 0.51 MG/DL (ref 0.3–1.2)
BUN BLDV-MCNC: 27 MG/DL (ref 8–23)
BUN/CREAT BLD: ABNORMAL (ref 9–20)
CALCIUM SERPL-MCNC: 9 MG/DL (ref 8.6–10.4)
CHLORIDE BLD-SCNC: 108 MMOL/L (ref 98–107)
CHOLESTEROL/HDL RATIO: 2.8
CHOLESTEROL: 171 MG/DL
CO2: 27 MMOL/L (ref 20–31)
CREAT SERPL-MCNC: 1.02 MG/DL (ref 0.7–1.2)
DIFFERENTIAL TYPE: ABNORMAL
EOSINOPHILS RELATIVE PERCENT: 1 % (ref 1–4)
ESTIMATED AVERAGE GLUCOSE: 186 MG/DL
GFR AFRICAN AMERICAN: >60 ML/MIN
GFR NON-AFRICAN AMERICAN: >60 ML/MIN
GFR SERPL CREATININE-BSD FRML MDRD: ABNORMAL ML/MIN/{1.73_M2}
GFR SERPL CREATININE-BSD FRML MDRD: ABNORMAL ML/MIN/{1.73_M2}
GLUCOSE BLD-MCNC: 149 MG/DL (ref 70–99)
HBA1C MFR BLD: 8.1 % (ref 4–6)
HCT VFR BLD CALC: 43.5 % (ref 40.7–50.3)
HDLC SERPL-MCNC: 61 MG/DL
HEMOGLOBIN: 14.3 G/DL (ref 13–17)
IMMATURE GRANULOCYTES: 0 %
LDL CHOLESTEROL: 93 MG/DL (ref 0–130)
LYMPHOCYTES # BLD: 22 % (ref 24–43)
MCH RBC QN AUTO: 32.3 PG (ref 25.2–33.5)
MCHC RBC AUTO-ENTMCNC: 32.9 G/DL (ref 28.4–34.8)
MCV RBC AUTO: 98.2 FL (ref 82.6–102.9)
MONOCYTES # BLD: 11 % (ref 3–12)
NRBC AUTOMATED: 0 PER 100 WBC
PDW BLD-RTO: 13.4 % (ref 11.8–14.4)
PLATELET # BLD: 221 K/UL (ref 138–453)
PLATELET ESTIMATE: ABNORMAL
PMV BLD AUTO: 10.8 FL (ref 8.1–13.5)
POTASSIUM SERPL-SCNC: 4.4 MMOL/L (ref 3.7–5.3)
PROSTATE SPECIFIC ANTIGEN: 4.81 UG/L
RBC # BLD: 4.43 M/UL (ref 4.21–5.77)
RBC # BLD: ABNORMAL 10*6/UL
SEG NEUTROPHILS: 64 % (ref 36–65)
SEGMENTED NEUTROPHILS ABSOLUTE COUNT: 3.37 K/UL (ref 1.5–8.1)
SODIUM BLD-SCNC: 145 MMOL/L (ref 135–144)
TOTAL PROTEIN: 6.2 G/DL (ref 6.4–8.3)
TRIGL SERPL-MCNC: 87 MG/DL
VLDLC SERPL CALC-MCNC: NORMAL MG/DL (ref 1–30)
WBC # BLD: 5.2 K/UL (ref 3.5–11.3)
WBC # BLD: ABNORMAL 10*3/UL

## 2020-10-12 PROCEDURE — 80061 LIPID PANEL: CPT

## 2020-10-12 PROCEDURE — G0103 PSA SCREENING: HCPCS

## 2020-10-12 PROCEDURE — 83036 HEMOGLOBIN GLYCOSYLATED A1C: CPT

## 2020-10-12 PROCEDURE — 85025 COMPLETE CBC W/AUTO DIFF WBC: CPT

## 2020-10-12 PROCEDURE — 36415 COLL VENOUS BLD VENIPUNCTURE: CPT

## 2020-10-12 PROCEDURE — 80053 COMPREHEN METABOLIC PANEL: CPT

## 2020-10-23 ENCOUNTER — OFFICE VISIT (OUTPATIENT)
Dept: FAMILY MEDICINE CLINIC | Age: 80
End: 2020-10-23
Payer: MEDICARE

## 2020-10-23 VITALS
OXYGEN SATURATION: 96 % | SYSTOLIC BLOOD PRESSURE: 116 MMHG | HEART RATE: 81 BPM | HEIGHT: 70 IN | TEMPERATURE: 97.3 F | DIASTOLIC BLOOD PRESSURE: 58 MMHG | WEIGHT: 131.6 LBS | BODY MASS INDEX: 18.84 KG/M2

## 2020-10-23 PROCEDURE — 99213 OFFICE O/P EST LOW 20 MIN: CPT | Performed by: FAMILY MEDICINE

## 2020-10-23 PROCEDURE — G0008 ADMIN INFLUENZA VIRUS VAC: HCPCS | Performed by: FAMILY MEDICINE

## 2020-10-23 PROCEDURE — 3288F FALL RISK ASSESSMENT DOCD: CPT | Performed by: FAMILY MEDICINE

## 2020-10-23 PROCEDURE — 90688 IIV4 VACCINE SPLT 0.5 ML IM: CPT | Performed by: FAMILY MEDICINE

## 2020-10-23 PROCEDURE — 3052F HG A1C>EQUAL 8.0%<EQUAL 9.0%: CPT | Performed by: FAMILY MEDICINE

## 2020-10-23 PROCEDURE — G8510 SCR DEP NEG, NO PLAN REQD: HCPCS | Performed by: FAMILY MEDICINE

## 2020-10-23 SDOH — ECONOMIC STABILITY: TRANSPORTATION INSECURITY
IN THE PAST 12 MONTHS, HAS THE LACK OF TRANSPORTATION KEPT YOU FROM MEDICAL APPOINTMENTS OR FROM GETTING MEDICATIONS?: NO

## 2020-10-23 SDOH — ECONOMIC STABILITY: FOOD INSECURITY: WITHIN THE PAST 12 MONTHS, YOU WORRIED THAT YOUR FOOD WOULD RUN OUT BEFORE YOU GOT MONEY TO BUY MORE.: NEVER TRUE

## 2020-10-23 SDOH — ECONOMIC STABILITY: INCOME INSECURITY: HOW HARD IS IT FOR YOU TO PAY FOR THE VERY BASICS LIKE FOOD, HOUSING, MEDICAL CARE, AND HEATING?: NOT HARD AT ALL

## 2020-10-23 SDOH — ECONOMIC STABILITY: TRANSPORTATION INSECURITY
IN THE PAST 12 MONTHS, HAS LACK OF TRANSPORTATION KEPT YOU FROM MEETINGS, WORK, OR FROM GETTING THINGS NEEDED FOR DAILY LIVING?: NO

## 2020-10-23 SDOH — ECONOMIC STABILITY: FOOD INSECURITY: WITHIN THE PAST 12 MONTHS, THE FOOD YOU BOUGHT JUST DIDN'T LAST AND YOU DIDN'T HAVE MONEY TO GET MORE.: NEVER TRUE

## 2020-10-23 ASSESSMENT — ENCOUNTER SYMPTOMS
SHORTNESS OF BREATH: 0
CONSTIPATION: 0
DIARRHEA: 0
BLOOD IN STOOL: 0
WHEEZING: 0
COUGH: 0
ABDOMINAL PAIN: 0
BACK PAIN: 0

## 2020-10-23 ASSESSMENT — PATIENT HEALTH QUESTIONNAIRE - PHQ9
SUM OF ALL RESPONSES TO PHQ QUESTIONS 1-9: 0
1. LITTLE INTEREST OR PLEASURE IN DOING THINGS: 0
SUM OF ALL RESPONSES TO PHQ QUESTIONS 1-9: 0
SUM OF ALL RESPONSES TO PHQ QUESTIONS 1-9: 0
2. FEELING DOWN, DEPRESSED OR HOPELESS: 0
SUM OF ALL RESPONSES TO PHQ9 QUESTIONS 1 & 2: 0

## 2020-10-23 NOTE — PROGRESS NOTES
Cielo Stephenson is a 68 y.o. male who presents todayfor his medical conditions/complaints as noted below. Cielo Stephenson is here today c/oMedication Check      :     HPI    Routine follow up he is compliant with his meds but not with diet. No past medical history on file. Past Surgical History:   Procedure Laterality Date    EYE SURGERY       Family History   Problem Relation Age of Onset    High Blood Pressure Mother     Heart Disease Mother     Kidney Disease Mother     Other Sister         obesity     Social History     Tobacco Use    Smoking status: Current Every Day Smoker     Packs/day: 1.00     Years: 60.00     Pack years: 60.00     Types: Cigarettes    Smokeless tobacco: Never Used   Substance Use Topics    Alcohol use: No      Current Outpatient Medications   Medication Sig Dispense Refill    metFORMIN (GLUCOPHAGE) 850 MG tablet Take 1 tablet by mouth 2 times daily (with meals) 180 tablet 3    tamsulosin (FLOMAX) 0.4 MG capsule Take 0.4 mg by mouth daily      latanoprost (XALATAN) 0.005 % ophthalmic solution Administer 1 drop to the right eye nightly. No current facility-administered medications for this visit. No Known Allergies      Subjective:   Review of Systems   Constitutional: Negative for chills, diaphoresis, fatigue and fever. HENT: Negative for congestion and hearing loss. Eyes: Positive for visual disturbance. Respiratory: Negative for cough, shortness of breath and wheezing. Cardiovascular: Negative for chest pain, palpitations and leg swelling. Gastrointestinal: Negative for abdominal pain, blood in stool, constipation and diarrhea. Genitourinary: Negative for dysuria. Musculoskeletal: Negative for arthralgias, back pain, gait problem and neck pain. Skin: Negative for rash. Neurological: Negative for weakness, numbness and headaches.    Psychiatric/Behavioral: Negative for dysphoric mood and sleep disturbance.       :   BP (!) 116/58 Pulse 81   Temp 97.3 °F (36.3 °C)   Ht 5' 10\" (1.778 m)   Wt 131 lb 9.6 oz (59.7 kg)   SpO2 96%   BMI 18.88 kg/m²     Physical Exam  Constitutional:       General: He is not in acute distress. Appearance: He is well-developed. He is not diaphoretic. HENT:      Head: Normocephalic and atraumatic. Mouth/Throat:      Pharynx: No oropharyngeal exudate. Eyes:      General: No scleral icterus. Right eye: No discharge. Left eye: No discharge. Neck:      Musculoskeletal: Neck supple. Thyroid: No thyromegaly. Vascular: No carotid bruit. Cardiovascular:      Rate and Rhythm: Normal rate and regular rhythm. Heart sounds: Normal heart sounds. No murmur. No friction rub. No gallop. Pulmonary:      Effort: No respiratory distress. Breath sounds: Normal breath sounds. No wheezing or rales. Chest:      Chest wall: No tenderness. Abdominal:      Tenderness: There is no abdominal tenderness. Musculoskeletal:         General: No tenderness. Lymphadenopathy:      Cervical: No cervical adenopathy. Skin:     Findings: No rash. Neurological:      Mental Status: He is alert and oriented to person, place, and time. Cranial Nerves: No cranial nerve deficit. Coordination: Coordination normal.      Gait: Gait normal.   Psychiatric:         Behavior: Behavior normal.         Thought Content: Thought content normal.         Judgment: Judgment normal.     PSA was elevated but lower than last check. Assessment:       Diagnosis Orders   1. Type 2 diabetes mellitus without complication, without long-term current use of insulin (HCC)  Comprehensive Metabolic Panel    Lipid Panel    Hemoglobin A1C   2. Essential hypertension  CBC Auto Differential    Comprehensive Metabolic Panel    Lipid Panel         Plan:      Return in about 6 months (around 4/23/2021).     Orders Placed This Encounter   Procedures    INFLUENZA, QUADV, 3 YRS AND OLDER, IM, MDV, 0.5ML (AFLURIA QUADV)    CBC Auto Differential     Standing Status:   Future     Standing Expiration Date:   10/23/2021    Comprehensive Metabolic Panel     Standing Status:   Future     Standing Expiration Date:   10/23/2021    Lipid Panel     Standing Status:   Future     Standing Expiration Date:   10/23/2021     Order Specific Question:   Is Patient Fasting?/# of Hours     Answer:   12 hour fast    Hemoglobin A1C     Standing Status:   Future     Standing Expiration Date:   10/23/2021     Orders Placed This Encounter   Medications    metFORMIN (GLUCOPHAGE) 850 MG tablet     Sig: Take 1 tablet by mouth 2 times daily (with meals)     Dispense:  180 tablet     Refill:  3     Will increase metformin from 500 to 850 bid. (he wishes to use up his 500 mg dose tid til gone). Flu vaccine.   He will have above labs checked in six months and see my replacement post.

## 2021-04-23 ENCOUNTER — IMMUNIZATION (OUTPATIENT)
Dept: FAMILY MEDICINE CLINIC | Age: 81
End: 2021-04-23
Payer: MEDICARE

## 2021-04-23 PROCEDURE — 91300 COVID-19, PFIZER VACCINE 30MCG/0.3ML DOSE: CPT | Performed by: INTERNAL MEDICINE

## 2021-04-23 PROCEDURE — 0001A COVID-19, PFIZER VACCINE 30MCG/0.3ML DOSE: CPT | Performed by: INTERNAL MEDICINE

## 2021-05-14 ENCOUNTER — IMMUNIZATION (OUTPATIENT)
Dept: FAMILY MEDICINE CLINIC | Age: 81
End: 2021-05-14
Payer: MEDICARE

## 2021-05-14 PROCEDURE — 91300 COVID-19, PFIZER VACCINE 30MCG/0.3ML DOSE: CPT | Performed by: INTERNAL MEDICINE

## 2021-05-14 PROCEDURE — 0002A COVID-19, PFIZER VACCINE 30MCG/0.3ML DOSE: CPT | Performed by: INTERNAL MEDICINE

## 2021-08-27 ENCOUNTER — OFFICE VISIT (OUTPATIENT)
Dept: FAMILY MEDICINE CLINIC | Age: 81
End: 2021-08-27
Payer: MEDICARE

## 2021-08-27 VITALS
WEIGHT: 127 LBS | OXYGEN SATURATION: 96 % | DIASTOLIC BLOOD PRESSURE: 70 MMHG | BODY MASS INDEX: 18.22 KG/M2 | HEART RATE: 69 BPM | SYSTOLIC BLOOD PRESSURE: 130 MMHG

## 2021-08-27 DIAGNOSIS — Z13.220 LIPID SCREENING: ICD-10-CM

## 2021-08-27 DIAGNOSIS — N40.1 BENIGN NON-NODULAR PROSTATIC HYPERPLASIA WITH LOWER URINARY TRACT SYMPTOMS: ICD-10-CM

## 2021-08-27 DIAGNOSIS — H54.3 BLINDNESS OF BOTH EYES: ICD-10-CM

## 2021-08-27 DIAGNOSIS — E11.9 TYPE 2 DIABETES MELLITUS WITHOUT COMPLICATION, WITHOUT LONG-TERM CURRENT USE OF INSULIN (HCC): Primary | ICD-10-CM

## 2021-08-27 DIAGNOSIS — I10 ESSENTIAL HYPERTENSION: ICD-10-CM

## 2021-08-27 DIAGNOSIS — R97.20 ELEVATED PSA: ICD-10-CM

## 2021-08-27 DIAGNOSIS — R53.83 FATIGUE, UNSPECIFIED TYPE: ICD-10-CM

## 2021-08-27 DIAGNOSIS — H40.10X4: ICD-10-CM

## 2021-08-27 LAB — HBA1C MFR BLD: 6.5 %

## 2021-08-27 PROCEDURE — 99214 OFFICE O/P EST MOD 30 MIN: CPT | Performed by: FAMILY MEDICINE

## 2021-08-27 PROCEDURE — 83036 HEMOGLOBIN GLYCOSYLATED A1C: CPT | Performed by: FAMILY MEDICINE

## 2021-08-27 RX ORDER — TAMSULOSIN HYDROCHLORIDE 0.4 MG/1
0.8 CAPSULE ORAL DAILY
Qty: 60 CAPSULE | Refills: 0 | Status: ON HOLD
Start: 2021-08-27 | End: 2022-04-08

## 2021-08-27 ASSESSMENT — ENCOUNTER SYMPTOMS
ORTHOPNEA: 0
ALLERGIC/IMMUNOLOGIC NEGATIVE: 1
GASTROINTESTINAL NEGATIVE: 1
VOMITING: 0
RESPIRATORY NEGATIVE: 1
NAUSEA: 0
EYES NEGATIVE: 1
SHORTNESS OF BREATH: 0

## 2021-08-27 ASSESSMENT — PATIENT HEALTH QUESTIONNAIRE - PHQ9
SUM OF ALL RESPONSES TO PHQ QUESTIONS 1-9: 0
SUM OF ALL RESPONSES TO PHQ QUESTIONS 1-9: 0
SUM OF ALL RESPONSES TO PHQ9 QUESTIONS 1 & 2: 0
SUM OF ALL RESPONSES TO PHQ QUESTIONS 1-9: 0
1. LITTLE INTEREST OR PLEASURE IN DOING THINGS: 0
2. FEELING DOWN, DEPRESSED OR HOPELESS: 0

## 2021-08-27 NOTE — PROGRESS NOTES
[facility] Progress Note    Date:8/27/2021         Patient Name:Paras Simms     YOB: 1940     Age:81 y.o. Assessment/Plan        Problem List Items Addressed This Visit        Circulatory    Essential hypertension      Well-controlled, continue current treatment plan and lifestyle modifications recommended         Relevant Orders    Comprehensive Metabolic Panel       Endocrine    Type 2 diabetes mellitus without complication (HCC) - Primary      Well-controlled, continue current medications, continue current treatment plan, medication adherence emphasized and lifestyle modifications recommended         Relevant Orders    POCT glycosylated hemoglobin (Hb A1C) (Completed)    Comprehensive Metabolic Panel    Lipid Panel    Microalbumin, Ur       Genitourinary    Benign non-nodular prostatic hyperplasia with lower urinary tract symptoms      Monitored by specialist- no acute findings meriting change in the plan         Relevant Medications    tamsulosin (FLOMAX) 0.4 MG capsule    Other Relevant Orders    Comprehensive Metabolic Panel    PSA, Diagnostic       Other    Advanced open-angle glaucoma      Monitored by specialist- no acute findings meriting change in the plan         Elevated PSA      Monitored by specialist- no acute findings meriting change in the plan         Relevant Orders    PSA, Diagnostic    Blindness of both eyes      Stable           Other Visit Diagnoses     Lipid screening        Relevant Orders    Lipid Panel    Fatigue, unspecified type        Relevant Orders    CBC Auto Differential    TSH with Reflex    Testosterone           Return in about 6 months (around 2/27/2022). Electronically signed by Cinthia Boles DO on 8/27/21         Mode Murillo is a 80 y.o. male presenting today for   Chief Complaint   Patient presents with    Hypertension    Diabetes   .     Glaucoma - see ophtho  Blind in both eyes    Daughter here helping with HPI and ROS - has cancer herself starting treatment soon    Hypertension  This is a chronic problem. The current episode started more than 1 year ago. The problem has been gradually improving since onset. The problem is controlled. Pertinent negatives include no anxiety, chest pain, headaches, malaise/fatigue, neck pain, orthopnea, palpitations, peripheral edema, PND, shortness of breath or sweats. Past treatments include lifestyle changes. The current treatment provides significant improvement. Diabetes  He presents for his follow-up diabetic visit. He has type 2 diabetes mellitus. His disease course has been improving. There are no hypoglycemic associated symptoms. Pertinent negatives for hypoglycemia include no headaches or sweats. There are no diabetic associated symptoms. Pertinent negatives for diabetes include no chest pain. There are no hypoglycemic complications. Symptoms are stable. Current diabetic treatment includes oral agent (monotherapy). He is compliant with treatment most of the time. His weight is fluctuating minimally. His home blood glucose trend is fluctuating minimally. An ACE inhibitor/angiotensin II receptor blocker is not being taken. Benign Prostatic Hypertrophy  This is a chronic problem. The current episode started more than 1 year ago. The problem has been waxing and waning since onset. Irritative symptoms include nocturia. Irritative symptoms do not include frequency or urgency. Obstructive symptoms do not include dribbling, incomplete emptying, an intermittent stream, a slower stream, straining or a weak stream. Pertinent negatives include no chills, dysuria, genital pain, hematuria, hesitancy, nausea or vomiting. Past treatments include tamsulosin. The treatment provided mild relief. Review of Systems   Review of Systems   Constitutional: Negative. Negative for chills and malaise/fatigue. HENT: Negative. Eyes: Negative. Respiratory: Negative. Negative for shortness of breath. Cardiovascular: Negative. Negative for chest pain, palpitations, orthopnea and PND. Gastrointestinal: Negative. Negative for nausea and vomiting. Endocrine: Negative. Genitourinary: Positive for nocturia. Negative for dysuria, frequency, hematuria, hesitancy, incomplete emptying and urgency. Musculoskeletal: Negative. Negative for neck pain. Skin: Negative. Allergic/Immunologic: Negative. Neurological: Negative. Negative for headaches. Hematological: Negative. Psychiatric/Behavioral: Negative. All other systems reviewed and are negative. Medications     Current Outpatient Medications   Medication Sig Dispense Refill    tamsulosin (FLOMAX) 0.4 MG capsule Take 2 capsules by mouth daily 60 capsule 0    metFORMIN (GLUCOPHAGE) 850 MG tablet Take 1 tablet by mouth 2 times daily (with meals) 180 tablet 3    latanoprost (XALATAN) 0.005 % ophthalmic solution Administer 1 drop to the right eye nightly. No current facility-administered medications for this visit. Past History    Past Medical History:   has no past medical history on file. Social History:   reports that he has been smoking cigarettes. He has a 60.00 pack-year smoking history. He has never used smokeless tobacco. He reports current drug use. Drug: Marijuana. He reports that he does not drink alcohol. Family History:   Family History   Problem Relation Age of Onset    High Blood Pressure Mother     Heart Disease Mother     Kidney Disease Mother     Other Sister         obesity       Surgical History:   Past Surgical History:   Procedure Laterality Date    EYE SURGERY          Physical Examination      Vitals:  /70 (Site: Left Upper Arm, Position: Sitting, Cuff Size: Medium Adult)   Pulse 69   Wt 127 lb (57.6 kg)   SpO2 96%   BMI 18.22 kg/m²     Physical Exam  Vitals and nursing note reviewed. Constitutional:       General: He is not in acute distress. Appearance: Normal appearance.  He is underweight. He is not ill-appearing, toxic-appearing or diaphoretic. HENT:      Head: Normocephalic and atraumatic. Right Ear: External ear normal.      Left Ear: External ear normal.   Eyes:      General: No scleral icterus. Right eye: No discharge. Left eye: No discharge. Conjunctiva/sclera: Conjunctivae normal.      Comments: Blind in both eyes   Cardiovascular:      Rate and Rhythm: Normal rate and regular rhythm. Pulses: Normal pulses. Heart sounds: Normal heart sounds. No murmur heard. No friction rub. No gallop. Pulmonary:      Effort: Pulmonary effort is normal. No respiratory distress. Breath sounds: Normal breath sounds. No stridor. No wheezing, rhonchi or rales. Chest:      Chest wall: No tenderness. Skin:     General: Skin is warm. Coloration: Skin is not jaundiced or pale. Neurological:      Mental Status: He is alert and oriented to person, place, and time. Mental status is at baseline. Psychiatric:         Mood and Affect: Mood normal.         Behavior: Behavior normal.         Thought Content: Thought content normal.         Judgment: Judgment normal.         Labs/Imaging/Diagnostics   Labs:  Hemoglobin A1C   Date Value Ref Range Status   08/27/2021 6.5 % Final       Imaging Last 24 Hours:  No image results found.

## 2021-08-27 NOTE — PATIENT INSTRUCTIONS
Patient Education        Counting Carbohydrates for Diabetes: Care Instructions  Your Care Instructions     You don't have to eat special foods when you have diabetes. You just have to be careful to eat healthy foods. Carbohydrates (carbs) raise blood sugar higher and quicker than any other nutrient. Carbs are found in desserts, breads and cereals, and fruit. They're also in starchy vegetables. These include potatoes, corn, and grains such as rice and pasta. Carbs are also in milk and yogurt. The more carbs you eat at one time, the higher your blood sugar will rise. Spreading carbs all through the day helps keep your blood sugar levels within your target range. Counting carbs is one of the best ways to keep your blood sugar under control. If you use insulin, counting carbs helps you match the right amount of insulin to the number of grams of carbs in a meal. Then you can change your diet and insulin dose as needed. Testing your blood sugar several times a day can help you learn how carbs affect your blood sugar. A registered dietitian or certified diabetes educator can help you plan meals and snacks. Follow-up care is a key part of your treatment and safety. Be sure to make and go to all appointments, and call your doctor if you are having problems. It's also a good idea to know your test results and keep a list of the medicines you take. How can you care for yourself at home? Know your daily amount of carbohydrates  Your daily amount depends on several things, such as your weight, how active you are, which diabetes medicines you take, and what your goals are for your blood sugar levels. A registered dietitian or certified diabetes educator can help you plan how many carbs to include in each meal and snack. For most adults, a guideline for the daily amount of carbs is:  · 45 to 60 grams at each meal. That's about the same as 3 to 4 carbohydrate servings. · 15 to 20 grams at each snack.  That's about the same as 1 carbohydrate serving. Count carbs  Counting carbs lets you know how much rapid-acting insulin to take before you eat. If you use an insulin pump, you get a constant rate of insulin during the day. So the pump must be programmed at meals. This gives you extra insulin to cover the rise in blood sugar after meals. If you take insulin:  · Learn your own insulin-to-carb ratio. You and your diabetes health professional will figure out the ratio. You can do this by testing your blood sugar after meals. For example, you may need a certain amount of insulin for every 15 grams of carbs. · Add up the carb grams in a meal. Then you can figure out how many units of insulin to take based on your insulin-to-carb ratio. · Exercise lowers blood sugar. You can use less insulin than you would if you were not doing exercise. Keep in mind that timing matters. If you exercise within 1 hour after a meal, your body may need less insulin for that meal than it would if you exercised 3 hours after the meal. Test your blood sugar to find out how exercise affects your need for insulin. If you do or don't take insulin:  · Look at labels on packaged foods. This can tell you how many carbs are in a serving. You can also use guides from the American Diabetes Association. · Be aware of portions, or serving sizes. If a package has two servings and you eat the whole package, you need to double the number of grams of carbohydrate listed for one serving. · Protein, fat, and fiber do not raise blood sugar as much as carbs do. If you eat a lot of these nutrients in a meal, your blood sugar will rise more slowly than it would otherwise. Eat from all food groups  · Eat at least three meals a day. · Plan meals to include food from all the food groups. The food groups include grains, fruits, dairy, proteins, and vegetables. · Talk to your dietitian or diabetes educator about ways to add limited amounts of sweets into your meal plan.   · If you drink alcohol, talk to your doctor. It may not be recommended when you are taking certain diabetes medicines. Where can you learn more? Go to https://chpepiceweb.[employer]. org and sign in to your [employer] account. Enter J688 in the [employer]ark box to learn more about \"Counting Carbohydrates for Diabetes: Care Instructions. \"     If you do not have an account, please click on the \"Sign Up Now\" link. Current as of: August 31, 2020               Content Version: 12.9  © 8160-4656 Healthwise, Incorporated. Care instructions adapted under license by [facility] ([facility]). If you have questions about a medical condition or this instruction, always ask your healthcare professional. [address] any warranty or liability for your use of this information.

## 2021-10-15 ENCOUNTER — TELEPHONE (OUTPATIENT)
Dept: FAMILY MEDICINE CLINIC | Age: 81
End: 2021-10-15

## 2021-10-15 NOTE — TELEPHONE ENCOUNTER
Provider has 11/4/2021 at 1:40pm available so I scheduled the appt. Called patients daughter without success of speaking to anyone so I left a detailed message on recorder to call back and further advise.

## 2021-10-15 NOTE — TELEPHONE ENCOUNTER
----- Message from Mallorie Raghavendra sent at 10/15/2021  8:52 AM EDT -----  Subject: Appointment Request    Reason for Call: Routine Existing Condition Follow Up (Diabetes)    QUESTIONS  Type of Appointment? Established Patient  Reason for appointment request? Available appointments did not meet   patient need  Additional Information for Provider? Patients daughter called in to   rescheduled follow up appointment - daughter wanted to see if it was   possible to schedule appointment on 10/26 or 11/4 in the afternoon because   she is already off on those days to be able to take patient to the doctor. ---------------------------------------------------------------------------  --------------  Nikkie GARRETT  What is the best way for the office to contact you? OK to leave message on   voicemail  Preferred Call Back Phone Number? [phone]  ---------------------------------------------------------------------------  --------------  SCRIPT ANSWERS  Relationship to Patient? Third Party  Representative Name? Narcisa Henson daughter  Have your symptoms changed? No  (Is the patient requesting to be seen urgently for their symptoms?)? No  Is this follow up request related to routine Diabetes Management? Yes  Are you having any new concerns about your existing condition? No  Have you been diagnosed with, awaiting test results for, or told that you   are suspected of having COVID-19 (Coronavirus)? (If patient has tested   negative or was tested as a requirement for work, school, or travel and   not based on symptoms, answer no)? No  Within the past two weeks have you developed any of the following symptoms   (answer no if symptoms have been present longer than 2 weeks or began   more than 2 weeks ago)?  Fever or Chills, Cough, Shortness of breath or   difficulty breathing, Loss of taste or smell, Sore throat, Nasal   congestion, Sneezing or runny nose, Fatigue or generalized body aches   (answer no if pain is specific to a body part e.g. back pain), Diarrhea,   Headache? No  Have you had close contact with someone with COVID-19 in the last 14 days? No  (Service Expert  click yes below to proceed with QR Artist As Usual   Scheduling)?  Yes

## 2021-10-15 NOTE — TELEPHONE ENCOUNTER
----- Message from Yfn Alka sent at 10/15/2021  8:52 AM EDT -----  Subject: Appointment Request    Reason for Call: Routine Existing Condition Follow Up (Diabetes)    QUESTIONS  Type of Appointment? Established Patient  Reason for appointment request? Available appointments did not meet   patient need  Additional Information for Provider? Patients daughter called in to   rescheduled follow up appointment - daughter wanted to see if it was   possible to schedule appointment on 10/26 or 11/4 in the afternoon because   she is already off on those days to be able to take patient to the doctor. ---------------------------------------------------------------------------  --------------  [employer] INFO  What is the best way for the office to contact you? OK to leave message on   voicemail  Preferred Call Back Phone Number? [phone]  ---------------------------------------------------------------------------  --------------  SCRIPT ANSWERS  Relationship to Patient? Third Party  Representative Name? Niesha amado  Have your symptoms changed? No  (Is the patient requesting to be seen urgently for their symptoms?)? No  Is this follow up request related to routine Diabetes Management? Yes  Are you having any new concerns about your existing condition? No  Have you been diagnosed with, awaiting test results for, or told that you   are suspected of having COVID-19 (Coronavirus)? (If patient has tested   negative or was tested as a requirement for work, school, or travel and   not based on symptoms, answer no)? No  Within the past two weeks have you developed any of the following symptoms   (answer no if symptoms have been present longer than 2 weeks or began   more than 2 weeks ago)?  Fever or Chills, Cough, Shortness of breath or   difficulty breathing, Loss of taste or smell, Sore throat, Nasal   congestion, Sneezing or runny nose, Fatigue or generalized body aches   (answer no if pain is specific to a body

## 2021-10-30 ENCOUNTER — HOSPITAL ENCOUNTER (OUTPATIENT)
Age: 81
Discharge: HOME | End: 2021-10-30
Payer: MEDICARE

## 2021-10-30 DIAGNOSIS — E11.9 TYPE 2 DIABETES MELLITUS WITHOUT COMPLICATION, WITHOUT LONG-TERM CURRENT USE OF INSULIN (HCC): ICD-10-CM

## 2021-10-30 DIAGNOSIS — R97.20 ELEVATED PSA: ICD-10-CM

## 2021-10-30 DIAGNOSIS — Z13.220 LIPID SCREENING: ICD-10-CM

## 2021-10-30 DIAGNOSIS — N40.1 BENIGN NON-NODULAR PROSTATIC HYPERPLASIA WITH LOWER URINARY TRACT SYMPTOMS: ICD-10-CM

## 2021-10-30 DIAGNOSIS — I10 ESSENTIAL HYPERTENSION: ICD-10-CM

## 2021-10-30 DIAGNOSIS — R53.83 FATIGUE, UNSPECIFIED TYPE: ICD-10-CM

## 2021-10-30 LAB
ABSOLUTE EOS #: 0.06 K/UL (ref 0–0.44)
ABSOLUTE IMMATURE GRANULOCYTE: <0.03 K/UL (ref 0–0.3)
ABSOLUTE LYMPH #: 1.1 K/UL (ref 1.1–3.7)
ABSOLUTE MONO #: 0.55 K/UL (ref 0.1–1.2)
ALBUMIN SERPL-MCNC: 4.1 G/DL (ref 3.5–5.2)
ALBUMIN/GLOBULIN RATIO: 1.6 (ref 1–2.5)
ALP BLD-CCNC: 51 U/L (ref 40–129)
ALT SERPL-CCNC: 11 U/L (ref 5–41)
ANION GAP SERPL CALCULATED.3IONS-SCNC: 12 MMOL/L (ref 9–17)
AST SERPL-CCNC: 20 U/L
BASOPHILS # BLD: 1 % (ref 0–2)
BASOPHILS ABSOLUTE: 0.05 K/UL (ref 0–0.2)
BILIRUB SERPL-MCNC: 0.54 MG/DL (ref 0.3–1.2)
BUN BLDV-MCNC: 21 MG/DL (ref 8–23)
BUN/CREAT BLD: ABNORMAL (ref 9–20)
CALCIUM SERPL-MCNC: 9.1 MG/DL (ref 8.6–10.4)
CHLORIDE BLD-SCNC: 102 MMOL/L (ref 98–107)
CHOLESTEROL/HDL RATIO: 2.4
CHOLESTEROL: 193 MG/DL
CO2: 25 MMOL/L (ref 20–31)
CREAT SERPL-MCNC: 0.92 MG/DL (ref 0.7–1.2)
CREATININE URINE: 85.8 MG/DL (ref 39–259)
DIFFERENTIAL TYPE: ABNORMAL
EOSINOPHILS RELATIVE PERCENT: 1 % (ref 1–4)
GFR AFRICAN AMERICAN: >60 ML/MIN
GFR NON-AFRICAN AMERICAN: >60 ML/MIN
GFR SERPL CREATININE-BSD FRML MDRD: ABNORMAL ML/MIN/{1.73_M2}
GFR SERPL CREATININE-BSD FRML MDRD: ABNORMAL ML/MIN/{1.73_M2}
GLUCOSE BLD-MCNC: 120 MG/DL (ref 70–99)
HCT VFR BLD CALC: 43.7 % (ref 40.7–50.3)
HDLC SERPL-MCNC: 80 MG/DL
HEMOGLOBIN: 14.1 G/DL (ref 13–17)
IMMATURE GRANULOCYTES: 0 %
LDL CHOLESTEROL: 97 MG/DL (ref 0–130)
LYMPHOCYTES # BLD: 21 % (ref 24–43)
MCH RBC QN AUTO: 31.3 PG (ref 25.2–33.5)
MCHC RBC AUTO-ENTMCNC: 32.3 G/DL (ref 28.4–34.8)
MCV RBC AUTO: 96.9 FL (ref 82.6–102.9)
MICROALBUMIN/CREAT 24H UR: 41 MG/L
MICROALBUMIN/CREAT UR-RTO: 48 MCG/MG CREAT
MONOCYTES # BLD: 11 % (ref 3–12)
NRBC AUTOMATED: 0 PER 100 WBC
PDW BLD-RTO: 14 % (ref 11.8–14.4)
PLATELET # BLD: 238 K/UL (ref 138–453)
PLATELET ESTIMATE: ABNORMAL
PMV BLD AUTO: 10.3 FL (ref 8.1–13.5)
POTASSIUM SERPL-SCNC: 4.3 MMOL/L (ref 3.7–5.3)
PROSTATE SPECIFIC ANTIGEN: 3.78 UG/L
RBC # BLD: 4.51 M/UL (ref 4.21–5.77)
RBC # BLD: ABNORMAL 10*6/UL
SEG NEUTROPHILS: 66 % (ref 36–65)
SEGMENTED NEUTROPHILS ABSOLUTE COUNT: 3.42 K/UL (ref 1.5–8.1)
SODIUM BLD-SCNC: 139 MMOL/L (ref 135–144)
TESTOSTERONE TOTAL: 411 NG/DL (ref 220–1000)
TOTAL PROTEIN: 6.6 G/DL (ref 6.4–8.3)
TRIGL SERPL-MCNC: 78 MG/DL
TSH SERPL DL<=0.05 MIU/L-ACNC: 1.69 MIU/L (ref 0.3–5)
VLDLC SERPL CALC-MCNC: NORMAL MG/DL (ref 1–30)
WBC # BLD: 5.2 K/UL (ref 3.5–11.3)
WBC # BLD: ABNORMAL 10*3/UL

## 2021-10-30 PROCEDURE — 84153 ASSAY OF PSA TOTAL: CPT

## 2021-10-30 PROCEDURE — 84403 ASSAY OF TOTAL TESTOSTERONE: CPT

## 2021-10-30 PROCEDURE — 82570 ASSAY OF URINE CREATININE: CPT

## 2021-10-30 PROCEDURE — 82043 UR ALBUMIN QUANTITATIVE: CPT

## 2021-10-30 PROCEDURE — 80061 LIPID PANEL: CPT

## 2021-10-30 PROCEDURE — 80053 COMPREHEN METABOLIC PANEL: CPT

## 2021-10-30 PROCEDURE — 85025 COMPLETE CBC W/AUTO DIFF WBC: CPT

## 2021-10-30 PROCEDURE — 84443 ASSAY THYROID STIM HORMONE: CPT

## 2021-10-30 PROCEDURE — 36415 COLL VENOUS BLD VENIPUNCTURE: CPT

## 2021-11-04 ENCOUNTER — OFFICE VISIT (OUTPATIENT)
Dept: FAMILY MEDICINE CLINIC | Age: 81
End: 2021-11-04
Payer: MEDICARE

## 2021-11-04 VITALS
BODY MASS INDEX: 18.35 KG/M2 | HEIGHT: 70 IN | SYSTOLIC BLOOD PRESSURE: 122 MMHG | HEART RATE: 68 BPM | DIASTOLIC BLOOD PRESSURE: 80 MMHG | OXYGEN SATURATION: 97 % | WEIGHT: 128.2 LBS

## 2021-11-04 DIAGNOSIS — H40.10X4: ICD-10-CM

## 2021-11-04 DIAGNOSIS — N40.1 BENIGN NON-NODULAR PROSTATIC HYPERPLASIA WITH LOWER URINARY TRACT SYMPTOMS: ICD-10-CM

## 2021-11-04 DIAGNOSIS — E11.9 TYPE 2 DIABETES MELLITUS WITHOUT COMPLICATION, WITHOUT LONG-TERM CURRENT USE OF INSULIN (HCC): ICD-10-CM

## 2021-11-04 DIAGNOSIS — I10 ESSENTIAL HYPERTENSION: Primary | ICD-10-CM

## 2021-11-04 PROCEDURE — 3288F FALL RISK ASSESSMENT DOCD: CPT | Performed by: FAMILY MEDICINE

## 2021-11-04 PROCEDURE — 99214 OFFICE O/P EST MOD 30 MIN: CPT | Performed by: FAMILY MEDICINE

## 2021-11-04 SDOH — ECONOMIC STABILITY: FOOD INSECURITY: WITHIN THE PAST 12 MONTHS, YOU WORRIED THAT YOUR FOOD WOULD RUN OUT BEFORE YOU GOT MONEY TO BUY MORE.: NEVER TRUE

## 2021-11-04 SDOH — ECONOMIC STABILITY: FOOD INSECURITY: WITHIN THE PAST 12 MONTHS, THE FOOD YOU BOUGHT JUST DIDN'T LAST AND YOU DIDN'T HAVE MONEY TO GET MORE.: NEVER TRUE

## 2021-11-04 ASSESSMENT — ENCOUNTER SYMPTOMS
ALLERGIC/IMMUNOLOGIC NEGATIVE: 1
EYES NEGATIVE: 1
RESPIRATORY NEGATIVE: 1
NAUSEA: 0
VOMITING: 0
GASTROINTESTINAL NEGATIVE: 1
ORTHOPNEA: 0
SHORTNESS OF BREATH: 0

## 2021-11-04 ASSESSMENT — LIFESTYLE VARIABLES: HOW OFTEN DO YOU HAVE A DRINK CONTAINING ALCOHOL: 0

## 2021-11-04 ASSESSMENT — PATIENT HEALTH QUESTIONNAIRE - PHQ9
SUM OF ALL RESPONSES TO PHQ QUESTIONS 1-9: 2
1. LITTLE INTEREST OR PLEASURE IN DOING THINGS: 1
SUM OF ALL RESPONSES TO PHQ9 QUESTIONS 1 & 2: 2
2. FEELING DOWN, DEPRESSED OR HOPELESS: 1
SUM OF ALL RESPONSES TO PHQ QUESTIONS 1-9: 2
SUM OF ALL RESPONSES TO PHQ QUESTIONS 1-9: 2

## 2021-11-04 ASSESSMENT — SOCIAL DETERMINANTS OF HEALTH (SDOH): HOW HARD IS IT FOR YOU TO PAY FOR THE VERY BASICS LIKE FOOD, HOUSING, MEDICAL CARE, AND HEATING?: NOT HARD AT ALL

## 2021-11-04 NOTE — PATIENT INSTRUCTIONS
Patient Education        Counting Carbohydrates for Diabetes: Care Instructions  Your Care Instructions     You don't have to eat special foods when you have diabetes. You just have to be careful to eat healthy foods. Carbohydrates (carbs) raise blood sugar higher and quicker than any other nutrient. Carbs are found in desserts, breads and cereals, and fruit. They're also in starchy vegetables. These include potatoes, corn, and grains such as rice and pasta. Carbs are also in milk and yogurt. The more carbs you eat at one time, the higher your blood sugar will rise. Spreading carbs all through the day helps keep your blood sugar levels within your target range. Counting carbs is one of the best ways to keep your blood sugar under control. If you use insulin, counting carbs helps you match the right amount of insulin to the number of grams of carbs in a meal. Then you can change your diet and insulin dose as needed. Testing your blood sugar several times a day can help you learn how carbs affect your blood sugar. A registered dietitian or certified diabetes educator can help you plan meals and snacks. Follow-up care is a key part of your treatment and safety. Be sure to make and go to all appointments, and call your doctor if you are having problems. It's also a good idea to know your test results and keep a list of the medicines you take. How can you care for yourself at home? Know your daily amount of carbohydrates  Your daily amount depends on several things, such as your weight, how active you are, which diabetes medicines you take, and what your goals are for your blood sugar levels. A registered dietitian or certified diabetes educator can help you plan how many carbs to include in each meal and snack. For most adults, a guideline for the daily amount of carbs is:  · 45 to 60 grams at each meal. That's about the same as 3 to 4 carbohydrate servings. · 15 to 20 grams at each snack.  That's about the same as 1 carbohydrate serving. Count carbs  Counting carbs lets you know how much rapid-acting insulin to take before you eat. If you use an insulin pump, you get a constant rate of insulin during the day. So the pump must be programmed at meals. This gives you extra insulin to cover the rise in blood sugar after meals. If you take insulin:  · Learn your own insulin-to-carb ratio. You and your diabetes health professional will figure out the ratio. You can do this by testing your blood sugar after meals. For example, you may need a certain amount of insulin for every 15 grams of carbs. · Add up the carb grams in a meal. Then you can figure out how many units of insulin to take based on your insulin-to-carb ratio. · Exercise lowers blood sugar. You can use less insulin than you would if you were not doing exercise. Keep in mind that timing matters. If you exercise within 1 hour after a meal, your body may need less insulin for that meal than it would if you exercised 3 hours after the meal. Test your blood sugar to find out how exercise affects your need for insulin. If you do or don't take insulin:  · Look at labels on packaged foods. This can tell you how many carbs are in a serving. You can also use guides from the American Diabetes Association. · Be aware of portions, or serving sizes. If a package has two servings and you eat the whole package, you need to double the number of grams of carbohydrate listed for one serving. · Protein, fat, and fiber do not raise blood sugar as much as carbs do. If you eat a lot of these nutrients in a meal, your blood sugar will rise more slowly than it would otherwise. Eat from all food groups  · Eat at least three meals a day. · Plan meals to include food from all the food groups. The food groups include grains, fruits, dairy, proteins, and vegetables. · Talk to your dietitian or diabetes educator about ways to add limited amounts of sweets into your meal plan.   · If you drink alcohol, talk to your doctor. It may not be recommended when you are taking certain diabetes medicines. Where can you learn more? Go to https://chpepiceweb.[employer]. org and sign in to your [employer] account. Enter H414 in the [employer] box to learn more about \"Counting Carbohydrates for Diabetes: Care Instructions. \"     If you do not have an account, please click on the \"Sign Up Now\" link. Current as of: August 31, 2020               Content Version: 13.0  © 1232-3696 Healthwise, Incorporated. Care instructions adapted under license by [facility] [place] ([facility]). If you have questions about a medical condition or this instruction, always ask your healthcare professional. [address] any warranty or liability for your use of this information.

## 2021-11-04 NOTE — PROGRESS NOTES
[facility] Progress Note    Date:11/4/2021         Patient Name:Paras Simms     YOB: 1940     Age:81 y.o. Assessment/Plan        Problem List Items Addressed This Visit        Circulatory    Essential hypertension - Primary      Well-controlled, continue current treatment plan and lifestyle modifications recommended            Endocrine    Type 2 diabetes mellitus without complication ([facility] [address]      Well-controlled, continue current medications, continue current treatment plan, medication adherence emphasized and lifestyle modifications recommended            Genitourinary    Benign non-nodular prostatic hyperplasia with lower urinary tract symptoms      Monitored by specialist- no acute findings meriting change in the plan            Other    Advanced open-angle glaucoma      Monitored by specialist- no acute findings meriting change in the plan                Return in about 3 months (around 2/4/2022). Electronically signed by Maxime Yañez DO on 11/4/21         Mode Hayden is a 80 y.o. male presenting today for   Chief Complaint   Patient presents with    Other     2 month check   . Glaucoma - see ophtho  Blind in both eyes    Daughter here helping with HPI and ROS - has cancer herself starting treatment soon    Hypertension  This is a chronic problem. The current episode started more than 1 year ago. The problem has been gradually improving since onset. The problem is controlled. Pertinent negatives include no anxiety, chest pain, headaches, malaise/fatigue, neck pain, orthopnea, palpitations, peripheral edema, PND, shortness of breath or sweats. Past treatments include lifestyle changes. The current treatment provides significant improvement. Diabetes  He presents for his follow-up diabetic visit. He has type 2 diabetes mellitus. His disease course has been improving. There are no hypoglycemic associated symptoms.  Pertinent negatives for hypoglycemia include no headaches or sweats. There are no diabetic associated symptoms. Pertinent negatives for diabetes include no chest pain. There are no hypoglycemic complications. Symptoms are stable. Current diabetic treatment includes oral agent (monotherapy). He is compliant with treatment most of the time. His weight is fluctuating minimally. His home blood glucose trend is fluctuating minimally. An ACE inhibitor/angiotensin II receptor blocker is not being taken. Benign Prostatic Hypertrophy  This is a chronic problem. The current episode started more than 1 year ago. The problem has been waxing and waning since onset. Irritative symptoms include nocturia. Irritative symptoms do not include frequency or urgency. Obstructive symptoms do not include dribbling, incomplete emptying, an intermittent stream, a slower stream, straining or a weak stream. Pertinent negatives include no chills, dysuria, genital pain, hematuria, hesitancy, nausea or vomiting. Past treatments include tamsulosin. The treatment provided mild relief. Review of Systems   Review of Systems   Constitutional: Negative. Negative for chills and malaise/fatigue. HENT: Negative. Eyes: Negative. Respiratory: Negative. Negative for shortness of breath. Cardiovascular: Negative. Negative for chest pain, palpitations, orthopnea and PND. Gastrointestinal: Negative. Negative for nausea and vomiting. Endocrine: Negative. Genitourinary: Positive for nocturia. Negative for dysuria, frequency, hematuria, hesitancy, incomplete emptying and urgency. Musculoskeletal: Negative. Negative for neck pain. Skin: Negative. Allergic/Immunologic: Negative. Neurological: Negative. Negative for headaches. Hematological: Negative. Psychiatric/Behavioral: Negative. All other systems reviewed and are negative.       Medications     Current Outpatient Medications   Medication Sig Dispense Refill    tamsulosin (FLOMAX) 0.4 MG capsule Take 2 capsules by mouth daily 60 capsule 0    metFORMIN (GLUCOPHAGE) 850 MG tablet Take 1 tablet by mouth 2 times daily (with meals) 180 tablet 3    latanoprost (XALATAN) 0.005 % ophthalmic solution Administer 1 drop to the right eye nightly. No current facility-administered medications for this visit. Past History    Past Medical History:   has no past medical history on file. Social History:   reports that he has been smoking cigarettes. He has a 60.00 pack-year smoking history. He has never used smokeless tobacco. He reports current drug use. Drug: Marijuana Berneta Herbert). He reports that he does not drink alcohol. Family History:   Family History   Problem Relation Age of Onset    High Blood Pressure Mother     Heart Disease Mother     Kidney Disease Mother     Other Sister         obesity       Surgical History:   Past Surgical History:   Procedure Laterality Date    EYE SURGERY          Physical Examination      Vitals:  /80 (Site: Left Upper Arm, Position: Sitting, Cuff Size: Medium Adult)   Pulse 68   Ht 5' 10\" (1.778 m)   Wt 128 lb 3.2 oz (58.2 kg)   SpO2 97%   BMI 18.39 kg/m²     Physical Exam  Vitals and nursing note reviewed. Constitutional:       General: He is not in acute distress. Appearance: Normal appearance. He is underweight. He is not ill-appearing, toxic-appearing or diaphoretic. HENT:      Head: Normocephalic and atraumatic. Right Ear: External ear normal.      Left Ear: External ear normal.   Eyes:      General: No scleral icterus. Right eye: No discharge. Left eye: No discharge. Conjunctiva/sclera: Conjunctivae normal.      Comments: Blind in both eyes   Cardiovascular:      Rate and Rhythm: Normal rate and regular rhythm. Pulses: Normal pulses. Heart sounds: Normal heart sounds. No murmur heard. No friction rub. No gallop. Pulmonary:      Effort: Pulmonary effort is normal. No respiratory distress.       Breath sounds: Normal breath sounds. No stridor. No wheezing, rhonchi or rales. Chest:      Chest wall: No tenderness. Skin:     General: Skin is warm. Coloration: Skin is not jaundiced or pale. Neurological:      Mental Status: He is alert and oriented to person, place, and time. Mental status is at baseline. Psychiatric:         Mood and Affect: Mood normal.         Behavior: Behavior normal.         Thought Content: Thought content normal.         Judgment: Judgment normal.         Labs/Imaging/Diagnostics   Labs:  Hemoglobin A1C   Date Value Ref Range Status   08/27/2021 6.5 % Final       Imaging Last 24 Hours:  No image results found.

## 2021-12-17 ENCOUNTER — TELEPHONE (OUTPATIENT)
Dept: FAMILY MEDICINE CLINIC | Age: 81
End: 2021-12-17

## 2021-12-17 NOTE — TELEPHONE ENCOUNTER
While patient was in rehab they had him him on insulin sliding scale. Patient is now at home and home care is wanting to know if he needs to stay on insulin and metformin or do you want him to just go back to the metformin?  He does not have anything to test sugar at home

## 2021-12-20 ENCOUNTER — TELEPHONE (OUTPATIENT)
Dept: FAMILY MEDICINE CLINIC | Age: 81
End: 2021-12-20

## 2021-12-27 DIAGNOSIS — E11.9 TYPE 2 DIABETES MELLITUS WITHOUT COMPLICATION, WITHOUT LONG-TERM CURRENT USE OF INSULIN (HCC): ICD-10-CM

## 2021-12-27 DIAGNOSIS — I10 ESSENTIAL HYPERTENSION: Primary | ICD-10-CM

## 2021-12-27 NOTE — TELEPHONE ENCOUNTER
Flavia Esparza is calling to request a refill on the following medication(s):    Medication Request:  Requested Prescriptions     Pending Prescriptions Disp Refills    metFORMIN (GLUCOPHAGE) 850 MG tablet 180 tablet 3     Sig: Take 1 tablet by mouth 2 times daily (with meals)       Last Visit Date (If Applicable):  84/8/6699    Next Visit Date:    1/20/2022

## 2022-01-12 ENCOUNTER — APPOINTMENT (OUTPATIENT)
Dept: CT IMAGING | Age: 82
End: 2022-01-12
Payer: MEDICARE

## 2022-01-12 ENCOUNTER — APPOINTMENT (OUTPATIENT)
Dept: GENERAL RADIOLOGY | Age: 82
End: 2022-01-12
Payer: MEDICARE

## 2022-01-12 ENCOUNTER — HOSPITAL ENCOUNTER (EMERGENCY)
Age: 82
Discharge: HOME | End: 2022-01-12
Attending: EMERGENCY MEDICINE
Payer: MEDICARE

## 2022-01-12 VITALS
SYSTOLIC BLOOD PRESSURE: 177 MMHG | TEMPERATURE: 97.8 F | WEIGHT: 127 LBS | BODY MASS INDEX: 17.78 KG/M2 | HEIGHT: 71 IN | HEART RATE: 85 BPM | RESPIRATION RATE: 18 BRPM | DIASTOLIC BLOOD PRESSURE: 54 MMHG | OXYGEN SATURATION: 100 %

## 2022-01-12 DIAGNOSIS — S09.90XA INJURY OF HEAD, INITIAL ENCOUNTER: Primary | ICD-10-CM

## 2022-01-12 DIAGNOSIS — S01.81XA FACIAL LACERATION, INITIAL ENCOUNTER: ICD-10-CM

## 2022-01-12 DIAGNOSIS — S63.259A DISLOCATION OF FINGER, INITIAL ENCOUNTER: ICD-10-CM

## 2022-01-12 PROCEDURE — 26700 TREAT KNUCKLE DISLOCATION: CPT

## 2022-01-12 PROCEDURE — 73130 X-RAY EXAM OF HAND: CPT

## 2022-01-12 PROCEDURE — 12011 RPR F/E/E/N/L/M 2.5 CM/<: CPT

## 2022-01-12 PROCEDURE — 2500000003 HC RX 250 WO HCPCS: Performed by: EMERGENCY MEDICINE

## 2022-01-12 PROCEDURE — 99282 EMERGENCY DEPT VISIT SF MDM: CPT

## 2022-01-12 PROCEDURE — 70450 CT HEAD/BRAIN W/O DYE: CPT

## 2022-01-12 RX ORDER — LIDOCAINE HYDROCHLORIDE 10 MG/ML
5 INJECTION, SOLUTION INFILTRATION; PERINEURAL ONCE
Status: COMPLETED | OUTPATIENT
Start: 2022-01-12 | End: 2022-01-12

## 2022-01-12 RX ADMIN — LIDOCAINE HYDROCHLORIDE 5 ML: 10 INJECTION, SOLUTION INFILTRATION; PERINEURAL at 08:34

## 2022-01-12 ASSESSMENT — ENCOUNTER SYMPTOMS
CONSTIPATION: 0
SHORTNESS OF BREATH: 0
CHEST TIGHTNESS: 0
DIARRHEA: 0
APNEA: 0
SINUS PAIN: 0
COLOR CHANGE: 0
ABDOMINAL DISTENTION: 0
VOMITING: 0
EYES NEGATIVE: 1

## 2022-01-12 ASSESSMENT — PAIN SCALES - GENERAL
PAINLEVEL_OUTOF10: 0
PAINLEVEL_OUTOF10: 5

## 2022-01-12 NOTE — ED PROVIDER NOTES
EMERGENCY DEPARTMENT ENCOUNTER    Pt Name: Tami Fine  MRN: [id number]  [place] 1940  Date of evaluation: 1/12/22  CHIEF COMPLAINT       Chief Complaint   Patient presents with    Fall     denies LOC    Head Injury    Finger Injury     L index finger     HISTORY OF PRESENT ILLNESS   82-year-old male presents the emergency room for fall. Patient was outside this morning having part of a cigarette and states that he has problems with his right hip after a hip replacement. Patient denied loss of consciousness. He does not have a headache. He did cut his right eyebrow in the fall and has some pain and swelling to his left index finger. Fall occurred just earlier this morning. Patient did not take anything at home for pain. He lives with his daughter who is here with him. REVIEW OF SYSTEMS     Review of Systems   Constitutional: Negative for activity change, chills and diaphoresis. HENT: Negative for congestion, sinus pain and tinnitus. Eyes: Negative. Respiratory: Negative for apnea, chest tightness and shortness of breath. Gastrointestinal: Negative for abdominal distention, constipation, diarrhea and vomiting. Genitourinary: Negative for difficulty urinating and frequency. Musculoskeletal: Negative for arthralgias and myalgias. Skin: Negative for color change and rash. Neurological: Negative for dizziness. Hematological: Negative. Psychiatric/Behavioral: Negative. PASTMEDICAL HISTORY   History reviewed. No pertinent past medical history.   Past Problem List  Patient Active Problem List   Diagnosis Code    Type 2 diabetes mellitus without complication (HCC) T43.4    Essential hypertension I10    Advanced open-angle glaucoma H40.10X0    Benign non-nodular prostatic hyperplasia with lower urinary tract symptoms N40.1    Dry eyes, bilateral H04.123    Elevated PSA R97.20    Blindness of both eyes H54.3     SURGICAL HISTORY       Past Surgical History: Procedure Laterality Date    EYE SURGERY       CURRENT MEDICATIONS       Current Discharge Medication List      CONTINUE these medications which have NOT CHANGED    Details   metFORMIN (GLUCOPHAGE) 850 MG tablet Take 1 tablet by mouth 2 times daily (with meals)  Qty: 180 tablet, Refills: 1    Associated Diagnoses: Type 2 diabetes mellitus without complication, without long-term current use of insulin (HCC)      tamsulosin (FLOMAX) 0.4 MG capsule Take 2 capsules by mouth daily  Qty: 60 capsule, Refills: 0    Associated Diagnoses: Benign non-nodular prostatic hyperplasia with lower urinary tract symptoms      latanoprost (XALATAN) 0.005 % ophthalmic solution Administer 1 drop to the right eye nightly. ALLERGIES     has No Known Allergies. FAMILY HISTORY     He indicated that his mother is alive. He indicated that his father is . He indicated that his sister is alive. SOCIAL HISTORY       Social History     Tobacco Use    Smoking status: Current Every Day Smoker     Packs/day: 1.00     Years: 60.00     Pack years: 60.00     Types: Cigarettes    Smokeless tobacco: Never Used   Substance Use Topics    Alcohol use: No    Drug use: Yes     Types: Marijuana (Weed)     PHYSICAL EXAM     INITIAL VITALS: BP (!) 177/54   Pulse 85   Temp 97.8 °F (36.6 °C) (Oral)   Resp 18   Ht 5' 11\" (1.803 m)   Wt 127 lb (57.6 kg)   SpO2 100%   BMI 17.71 kg/m²    Physical Exam  Constitutional:       General: He is not in acute distress. Appearance: He is well-developed. HENT:      Head: Normocephalic. Comments: Fairly superficial minimally bleeding laceration of the right eyebrow approximately 2 cm in length no visualized foreign bodies  Eyes:      Pupils: Pupils are equal, round, and reactive to light. Cardiovascular:      Rate and Rhythm: Normal rate and regular rhythm. Heart sounds: Normal heart sounds. Pulmonary:      Effort: Pulmonary effort is normal. No respiratory distress. Breath sounds: Normal breath sounds. Abdominal:      General: Bowel sounds are normal.      Palpations: Abdomen is soft. Tenderness: There is no abdominal tenderness. Musculoskeletal:         General: Normal range of motion. Hands:    Skin:     General: Skin is warm and dry. Neurological:      Mental Status: He is alert and oriented to person, place, and time. MEDICAL DECISION MAKIN-year-old male presents emergency room for right eyebrow laceration and left index finger dislocation after a fall. Patient is alert and oriented. CT imaging of the head is negative for acute abnormality. Laceration and dislocation were repaired here in the ED without complication. Patient is with his daughter who is going to take him home. He is in no distress has stable vitals and no other pain or complaints here today. All questions were answered here from the ED and patient and daughter are comfortable with discharge plan.     CRITICAL CARE:       PROCEDURES:    Lac Repair    Date/Time: 2022 8:52 AM  Performed by: Ziggy Vergara MD  Authorized by: Ziggy Vergara MD     Consent:     Consent obtained:  Verbal    Consent given by:  Patient    Risks discussed:  Infection and pain  Laceration details:     Location:  Face    Face location:  R eyebrow    Length (cm):  2    Depth (mm):  1  Repair type:     Repair type:  Simple  Exploration:     Wound extent: no foreign bodies/material noted, no tendon damage noted and no underlying fracture noted    Treatment:     Area cleansed with:  Saline    Amount of cleaning:  Standard    Irrigation solution:  Sterile saline    Irrigation method:  Syringe  Skin repair:     Repair method:  Sutures    Suture size:  5-0    Suture material:  Prolene    Suture technique:  Simple interrupted    Number of sutures:  4  Approximation:     Approximation:  Close  Ortho Injury    Date/Time: 2022 8:53 AM  Performed by: Ziggy Vergara MD  Authorized by: Dominique Wayne MD   Consent: Verbal consent obtained. Consent given by: patient  Patient understanding: patient states understanding of the procedure being performed  Injury location: finger  Location details: left index finger  Injury type: dislocation  Pre-procedure distal perfusion: normal  Pre-procedure neurological function: normal  Pre-procedure range of motion: normal  Anesthesia: digital block    Anesthesia:  Local anesthesia used: yes  Local Anesthetic: lidocaine 1% without epinephrine  Manipulation performed: yes  Reduction successful: yes  Immobilization: splint  Post-procedure distal perfusion: normal  Post-procedure neurological function: normal  Post-procedure range of motion: normal          DIAGNOSTIC RESULTS   EKG:All EKG's are interpreted by the Emergency Department Physician who either signs or Co-signs this chart in the absence of a cardiologist.        RADIOLOGY:All plain film, CT, MRI, and formal ultrasound images (except ED bedside ultrasound) are read by the radiologist, see reports below, unless otherwisenoted in MDM or here. CT HEAD WO CONTRAST   Final Result   No acute intracranial abnormality. Generalized age-appropriate cortical atrophy with scattered white matter low   attenuation suggestive of chronic microvascular ischemic change. Intracranial atherosclerotic disease is noted. RECOMMENDATIONS:   Unavailable         XR HAND LEFT (MIN 3 VIEWS)   Final Result   Dislocation of the 2nd finger PIP joint. No acute fracture. LABS: All lab results were reviewed by myself, and all abnormals are listed below.   Labs Reviewed - No data to display    EMERGENCY DEPARTMENTCOURSE:         Vitals:    Vitals:    01/12/22 0645   BP: (!) 177/54   Pulse: 85   Resp: 18   Temp: 97.8 °F (36.6 °C)   TempSrc: Oral   SpO2: 100%   Weight: 127 lb (57.6 kg)   Height: 5' 11\" (1.803 m)       The patient was given the following medications while in the emergency department:  Orders Placed This Encounter   Medications    lidocaine 1 % injection 5 mL     CONSULTS:  None    FINAL IMPRESSION      1. Injury of head, initial encounter    2. Facial laceration, initial encounter    3. Dislocation of finger, initial encounter          DISPOSITION/PLAN   DISPOSITION Decision To Discharge 01/12/2022 08:47:26 AM      PATIENT REFERRED TO:  Michoacano Bai DO  [address]  [place] [address] [place] [address]  [phone]    Schedule an appointment as soon as possible for a visit in 1 week      DISCHARGE MEDICATIONS:  Current Discharge Medication List        Kristi Hathaway MD  Attending Emergency Physician      Care during this encounter was due to an unprecedented national emergency due to COVID-19.             Courtney Caldera MD  01/12/22 0903

## 2022-01-12 NOTE — ED NOTES
Pt to er with c/o fall this morning. Pt states he was walking and his legs \"gave in\". Pt denies feeling lightheaded or dizzy at time of fall. Pt has pain to right shoulder and left hand pain. Pt has no swelling or obvious deformities noted. Pt has cut noted above right eye. Pt denies loc. Pt states he uses a walking stick d/t glaucoma. Pt states he has a walker but does not use it. Pt lives with his daughter. Pt a&ox3. Skin warm and dry. Respirations even and non-labored.       Senait Rao RN  01/12/22 3803

## 2022-01-18 ENCOUNTER — OFFICE VISIT (OUTPATIENT)
Dept: FAMILY MEDICINE CLINIC | Age: 82
End: 2022-01-18
Payer: MEDICARE

## 2022-01-18 VITALS
OXYGEN SATURATION: 98 % | TEMPERATURE: 97.6 F | DIASTOLIC BLOOD PRESSURE: 74 MMHG | SYSTOLIC BLOOD PRESSURE: 118 MMHG | WEIGHT: 130 LBS | BODY MASS INDEX: 18.13 KG/M2 | HEART RATE: 74 BPM

## 2022-01-18 DIAGNOSIS — S01.81XD FACIAL LACERATION, SUBSEQUENT ENCOUNTER: ICD-10-CM

## 2022-01-18 DIAGNOSIS — Z91.81 AT HIGH RISK FOR FALLS: ICD-10-CM

## 2022-01-18 DIAGNOSIS — I10 ESSENTIAL HYPERTENSION: ICD-10-CM

## 2022-01-18 DIAGNOSIS — Z00.00 ROUTINE GENERAL MEDICAL EXAMINATION AT A HEALTH CARE FACILITY: Primary | ICD-10-CM

## 2022-01-18 DIAGNOSIS — F41.9 ANXIETY: ICD-10-CM

## 2022-01-18 DIAGNOSIS — S72.001D CLOSED FRACTURE OF RIGHT HIP WITH ROUTINE HEALING, SUBSEQUENT ENCOUNTER: ICD-10-CM

## 2022-01-18 DIAGNOSIS — E55.9 VITAMIN D DEFICIENCY: ICD-10-CM

## 2022-01-18 DIAGNOSIS — E11.9 TYPE 2 DIABETES MELLITUS WITHOUT COMPLICATION, WITHOUT LONG-TERM CURRENT USE OF INSULIN (HCC): ICD-10-CM

## 2022-01-18 PROBLEM — S72.001A CLOSED RIGHT HIP FRACTURE (HCC): Status: ACTIVE | Noted: 2021-11-19

## 2022-01-18 PROCEDURE — 99214 OFFICE O/P EST MOD 30 MIN: CPT | Performed by: FAMILY MEDICINE

## 2022-01-18 PROCEDURE — G0438 PPPS, INITIAL VISIT: HCPCS | Performed by: FAMILY MEDICINE

## 2022-01-18 PROCEDURE — 1111F DSCHRG MED/CURRENT MED MERGE: CPT | Performed by: FAMILY MEDICINE

## 2022-01-18 RX ORDER — INSULIN LISPRO 100 [IU]/ML
1-5 INJECTION, SOLUTION INTRAVENOUS; SUBCUTANEOUS
COMMUNITY
Start: 2021-11-30 | End: 2022-01-18 | Stop reason: ALTCHOICE

## 2022-01-18 RX ORDER — NICOTINE 21 MG/24HR
1 PATCH, TRANSDERMAL 24 HOURS TRANSDERMAL DAILY
COMMUNITY
Start: 2021-12-01 | End: 2022-01-18

## 2022-01-18 RX ORDER — IBUPROFEN 200 MG
400 CAPSULE ORAL 2 TIMES DAILY
COMMUNITY
Start: 2021-12-23

## 2022-01-18 RX ORDER — SERTRALINE HYDROCHLORIDE 25 MG/1
25 TABLET, FILM COATED ORAL DAILY
Qty: 90 TABLET | Refills: 1 | Status: SHIPPED | OUTPATIENT
Start: 2022-01-18

## 2022-01-18 RX ORDER — ACETAMINOPHEN 500 MG
1000 TABLET ORAL EVERY 6 HOURS PRN
COMMUNITY
Start: 2021-11-30

## 2022-01-18 ASSESSMENT — PATIENT HEALTH QUESTIONNAIRE - PHQ9
2. FEELING DOWN, DEPRESSED OR HOPELESS: 0
1. LITTLE INTEREST OR PLEASURE IN DOING THINGS: 0
SUM OF ALL RESPONSES TO PHQ QUESTIONS 1-9: 0
SUM OF ALL RESPONSES TO PHQ QUESTIONS 1-9: 0
SUM OF ALL RESPONSES TO PHQ9 QUESTIONS 1 & 2: 0
SUM OF ALL RESPONSES TO PHQ QUESTIONS 1-9: 0
SUM OF ALL RESPONSES TO PHQ QUESTIONS 1-9: 0

## 2022-01-18 ASSESSMENT — ENCOUNTER SYMPTOMS
GASTROINTESTINAL NEGATIVE: 1
ALLERGIC/IMMUNOLOGIC NEGATIVE: 1
EYES NEGATIVE: 1
RESPIRATORY NEGATIVE: 1

## 2022-01-18 ASSESSMENT — LIFESTYLE VARIABLES
HOW OFTEN DURING THE LAST YEAR HAVE YOU FOUND THAT YOU WERE NOT ABLE TO STOP DRINKING ONCE YOU HAD STARTED: 0
AUDIT TOTAL SCORE: 1
AUDIT-C TOTAL SCORE: 1
HOW OFTEN DURING THE LAST YEAR HAVE YOU NEEDED AN ALCOHOLIC DRINK FIRST THING IN THE MORNING TO GET YOURSELF GOING AFTER A NIGHT OF HEAVY DRINKING: 0
HOW MANY STANDARD DRINKS CONTAINING ALCOHOL DO YOU HAVE ON A TYPICAL DAY: 0
HOW OFTEN DURING THE LAST YEAR HAVE YOU HAD A FEELING OF GUILT OR REMORSE AFTER DRINKING: 0
HAVE YOU OR SOMEONE ELSE BEEN INJURED AS A RESULT OF YOUR DRINKING: 0
HOW OFTEN DURING THE LAST YEAR HAVE YOU FAILED TO DO WHAT WAS NORMALLY EXPECTED FROM YOU BECAUSE OF DRINKING: 0
HOW OFTEN DO YOU HAVE SIX OR MORE DRINKS ON ONE OCCASION: 0
HOW OFTEN DO YOU HAVE A DRINK CONTAINING ALCOHOL: 1
HOW OFTEN DURING THE LAST YEAR HAVE YOU BEEN UNABLE TO REMEMBER WHAT HAPPENED THE NIGHT BEFORE BECAUSE YOU HAD BEEN DRINKING: 0
HAS A RELATIVE, FRIEND, DOCTOR, OR ANOTHER HEALTH PROFESSIONAL EXPRESSED CONCERN ABOUT YOUR DRINKING OR SUGGESTED YOU CUT DOWN: 0

## 2022-01-18 NOTE — PATIENT INSTRUCTIONS
Personalized Preventive Plan for Angel [place]en[place] - 1/18/2022  Medicare offers a range of preventive health benefits. Some of the tests and screenings are paid in full while other may be subject to a deductible, co-insurance, and/or copay. Some of these benefits include a comprehensive review of your medical history including lifestyle, illnesses that may run in your family, and various assessments and screenings as appropriate. After reviewing your medical record and screening and assessments performed today your provider may have ordered immunizations, labs, imaging, and/or referrals for you. A list of these orders (if applicable) as well as your Preventive Care list are included within your After Visit Summary for your review. Other Preventive Recommendations:    · A preventive eye exam performed by an eye specialist is recommended every 1-2 years to screen for glaucoma; cataracts, macular degeneration, and other eye disorders. · A preventive dental visit is recommended every 6 months. · Try to get at least 150 minutes of exercise per week or 10,000 steps per day on a pedometer . · Order or download the FREE \"Exercise & Physical Activity: Your Everyday Guide\" from [employer]. Call 1-422.408.6167 or search [employer] online. · You need 0203-8225 mg of calcium and 8909-0781 IU of vitamin D per day. It is possible to meet your calcium requirement with diet alone, but a vitamin D supplement is usually necessary to meet this goal.  · When exposed to the sun, use a sunscreen that protects against both UVA and UVB radiation with an SPF of 30 or greater. Reapply every 2 to 3 hours or after sweating, drying off with a towel, or swimming. · Always wear a seat belt when traveling in a car. Always wear a helmet when riding a bicycle or motorcycle.

## 2022-01-18 NOTE — PROGRESS NOTES
Medicare Annual Wellness Visit  Name: Jesús García Date: 2022   MRN: [id number] Sex: Male   Age: [de-identified] y.o. Ethnicity: Non- / Non    : 1940 Race: White (non-)      Glenys Blackburn is here for Fall (has had 2 falls since last visit, broken hip first and black eye the second time ) and Medicare AWV    Screenings for behavioral, psychosocial and functional/safety risks, and cognitive dysfunction are all negative except as indicated below. These results, as well as other patient data from the [address] form, are documented in Flowsheets linked to this Encounter. No Known Allergies    Prior to Visit Medications    Medication Sig Taking? Authorizing Provider   Cholecalciferol 50 MCG (2000) TABS Take 2,000 Units by mouth daily Yes Historical Provider, MD   calcium citrate (CALCITRATE) 950 (200 Ca) MG tablet Take 400 mg by mouth 3 times daily (with meals) Yes Historical Provider, MD   acetaminophen (TYLENOL) 500 MG tablet Take 1,000 mg by mouth every 6 hours as needed Yes Historical Provider, MD   metFORMIN (GLUCOPHAGE) 850 MG tablet Take 1 tablet by mouth 2 times daily (with meals) Yes Almon Rival,    tamsulosin (FLOMAX) 0.4 MG capsule Take 2 capsules by mouth daily Yes Deborah Gupta DO   latanoprost (XALATAN) 0.005 % ophthalmic solution Administer 1 drop to the right eye nightly. Yes Historical Provider, MD       No past medical history on file.     Past Surgical History:   Procedure Laterality Date    EYE SURGERY         Family History   Problem Relation Age of Onset    High Blood Pressure Mother     Heart Disease Mother     Kidney Disease Mother     Other Sister         obesity       CareTeam (Including outside providers/suppliers regularly involved in providing care):   Patient Care Team:  Deborah Gupta DO as PCP - General (Family Medicine)  Deborah Gupta DO as PCP - [facility] Empaneled Provider    Wt Readings from Last 3 Encounters:   01/18/22 130 lb (59 kg)   01/12/22 127 lb (57.6 kg)   11/04/21 128 lb 3.2 oz (58.2 kg)     Vitals:    01/18/22 0857   BP: 118/74   Pulse: 74   Temp: 97.6 °F (36.4 °C)   TempSrc: Infrared   SpO2: 98%   Weight: 130 lb (59 kg)     Body mass index is 18.13 kg/m². Based upon direct observation of the patient, evaluation of cognition reveals recent and remote memory intact. Patient's complete Health Risk Assessment and screening values have been reviewed and are found in Flowsheets. The following problems were reviewed today and where indicated follow up appointments were made and/or referrals ordered. Positive Risk Factor Screenings with Interventions:     Fall Risk:  Timed Up and Go Test > 12 seconds? (Complete if either Fall Risk answers are Yes): (!) yes  2 or more falls in past year?: (!) yes  Fall with injury in past year?: (!) yes  Fall Risk Interventions:    · Home safety tips provided       Substance History:  Social History     Tobacco History     Smoking Status  Current Every Day Smoker Smoking Frequency  1 pack/day for 60 years (60 pk yrs) Smoking Tobacco Type  Cigarettes    Smokeless Tobacco Use  Never Used          Alcohol History     Alcohol Use Status  No          Drug Use     Drug Use Status  Yes Types  Marijuana (Weed)          Sexual Activity     Sexually Active  Not Asked               Alcohol Screening: Audit-C Score: 1  Total Score: 1    A score of 8 or more is associated with harmful or hazardous drinking. A score of 13 or more in women, and 15 or more in men, is likely to indicate alcohol dependence.   Substance Abuse Interventions:  · Alcohol misuse/dependence:  patient agrees to limit alcohol intake to a moderate level- no more than 14 drinks/week and 4 drinks per occasion for men, or no more than 7 drinks/week and 3 drinks/occasion for women      General Health and ACP:  General  In general, how would you say your health is?: Good  In the past 7 days, have you experienced any of the following? New or Increased Pain, New or Increased Fatigue, Loneliness, Social Isolation, Stress or Anger?: (!) New or Increased Pain (recent hip fracture)  Do you get the social and emotional support that you need?: Yes  Do you have a Living Will?: Yes  Advance Directives     Power of  Living Will ACP-Advance Directive ACP-Power of Agustín Rutherford on 08/27/21 Not on File Not on File Filed      General Health Risk Interventions:  · Pain issues: discussed recent falls    Health Habits/Nutrition:  Health Habits/Nutrition  Do you exercise for at least 20 minutes 2-3 times per week?: Yes  Have you lost any weight without trying in the past 3 months?: No  Do you eat only one meal per day?: No  Have you seen the dentist within the past year?: N/A - wear dentures     Health Habits/Nutrition Interventions:  · none    Hearing/Vision:  No exam data present  Hearing/Vision  Do you or your family notice any trouble with your hearing that hasn't been managed with hearing aids?: (!) Yes  Do you have difficulty driving, watching TV, or doing any of your daily activities because of your eyesight?: (!) Yes  Have you had an eye exam within the past year?: (!) No  Hearing/Vision Interventions:  · Hearing concerns:  patient declines any further evaluation/treatment for hearing issues  · Vision concerns:  see ophthalmo     ADL:  ADLs  In the past 7 days, did you need help from others to perform any of the following everyday activities? Eating, dressing, grooming, bathing, toileting, or walking/balance?: (!) Walking/Balance,Bathing (uses walker, has aid help)  In the past 7 days, did you need help from others to take care of any of the following?  Laundry, housekeeping, banking/finances, shopping, telephone use, food preparation, transportation, or taking medications?: (!) Laundry,Housekeeping,Banking/Finances,Shopping,Food Preparation,Transportation,Taking Medications (daughter helps)  ADL Interventions:  · living with daughter      Personalized Preventive Plan   Current Health Maintenance Status  Immunization History   Administered Date(s) Administered    COVID-19, Chatterjee Peter, PF, 30mcg/0.3mL 04/23/2021, 05/14/2021    Influenza, Cari Collier IM, (6 mo and older Fluzone, Flulaval, Fluarix and 3 yrs and older Afluria) 02/15/2018, 09/25/2019, 10/23/2020        Health Maintenance   Topic Date Due    DTaP/Tdap/Td vaccine (1 - Tdap) Never done    Shingles Vaccine (1 of 2) Never done    Low dose CT lung screening  Never done    Pneumococcal 65+ years Vaccine (1 of 1 - PPSV23) Never done   [employer] Visit (AWV)  Never done    Flu vaccine (1) 09/01/2021    COVID-19 Vaccine (3 - Booster for Pfizer series) 11/14/2021    Potassium monitoring  10/30/2022    Creatinine monitoring  10/30/2022    Depression Screen  11/04/2022    Hepatitis A vaccine  Aged Out    Hib vaccine  Aged Out    Meningococcal (ACWY) vaccine  Aged Out     Recommendations for [facility] & Safety Due: see orders and patient instructions/AVS.  . Recommended screening schedule for the next 5-10 years is provided to the patient in written form: see Patient Instructions/AVS.    Steve Campbell was seen today for fall and medicare awv.     Diagnoses and all orders for this visit:    Routine general medical examination at a health care facility                   [facility] or Hospital Follow Up      Hitesh Vela   YOB: 1940    Date of Office Visit:  1/18/2022  Date of Hospital Admission: see below  Date of Hospital Discharge: see below    Care management risk score Rising risk (score 2-5) and Complex Care (Scores >=6): 1     Non face to face  following discharge, date last encounter closed (first attempt may have been earlier): *No documented post hospital discharge outreach found in the last 14 days     Call initiated 2 business days of discharge: *No response recorded in the last 14 days    Patient Active Problem List   Diagnosis    Type 2 diabetes mellitus without complication ([facility] [address]    Essential hypertension    Advanced open-angle glaucoma    Benign non-nodular prostatic hyperplasia with lower urinary tract symptoms    Dry eyes, bilateral    Elevated PSA    Blindness of both eyes    Closed right hip fracture (HCC)       No Known Allergies    Medications listed as ordered at the time of discharge from hospital       Medications marked \"taking\" at this time  Outpatient Medications Marked as Taking for the 22 encounter (Office Visit) with Deborah Chiu DO   Medication Sig Dispense Refill    calcium citrate (CALCITRATE) 950 (200 Ca) MG tablet Take 400 mg by mouth 3 times daily (with meals)      acetaminophen (TYLENOL) 500 MG tablet Take 1,000 mg by mouth every 6 hours as needed      Cholecalciferol 50 MCG (2000 UT) TABS Take 1 tablet by mouth daily 90 tablet 1    sertraline (ZOLOFT) 25 MG tablet Take 1 tablet by mouth daily 90 tablet 1    metFORMIN (GLUCOPHAGE) 850 MG tablet Take 1 tablet by mouth 2 times daily (with meals) 180 tablet 1    tamsulosin (FLOMAX) 0.4 MG capsule Take 2 capsules by mouth daily 60 capsule 0    latanoprost (XALATAN) 0.005 % ophthalmic solution Administer 1 drop to the right eye nightly.           Medications patient taking as of now reconciled against medications ordered at time of hospital discharge: Yes    Chief Complaint   Patient presents with   [place] Close     has had 2 falls since last visit, broken hip first and black eye the second time    Abigail Samaniego Medicare AWV       DISCHARGE NOTE    Demographics:  Patient Name: Tami Fine : 1940   MRN: [id number]    DATE OF ADMISSION: 2021  DATE OF DISCHARGE: 2021  DISCHARGE DIAGNOSES:   Active Problems:  Closed right hip fracture (CMS-HCC)    CONSULTANTS:  Consulting Providers   Not on file       PCP: Patient Care Team:  Kayla Reyes MD as PCP - General (Family Medicine)  PROCEDURES PERFORMED: Right hip intramedullary nail    HOSPITAL COURSE SUMMARY:  Per HPI:  Patient is a 65-year-old male who came to the ER because of right hip pain was found to have a hip fracture. Patient was also noted to be COVID positive. He was admitted to the hospitalist service. He was seen a Infectious Diseases however his COVID symptoms were mild was not hypoxic patient however did require to be in isolation. Patient underwent intramedullary nail for intertrochanteric right hip fracture with orthopedics. Patient was noted to have dysphagia which resolved with speech therapy and he is presently on a regular diet. Patient will follow-up outpatient with Orthopedic. Assessment and Plan: This is a 65-year-old male who came in due to right hip pain.  This is following a mechanical fall that he had  1. Right hip pain following mechanical fall with subsequent right hip fracture and IM nail of the right hip fracture, outpatient follow-up with Orthopedic    2. Dysphagia resolved did well with speech therapy    3. BPH on Flomax and with urinary retention- has a Worley catheter in , discharging to rehab with the Worley catheter and will need a voiding trial as well as urology evaluation if fails the voiding trial.    4. Diabetes type 2 -metformin and insulin sliding scale    5. COVID-19 asymptomatic-out of isolation  6. Legally blind and hard of hearing  7. DVT prophylaxis - monitor platelet count and hemoglobin while on pharmacological DVT prophylaxis.     Mehnaz    Pt Name: Charley Terrazas  MRN: [id number]  [place] 1940  Date of evaluation: 1/12/22  CHIEF COMPLAINT       Chief Complaint  Patient presents with   Fall      denies LOC   Head Injury   Finger Injury      L index finger     HISTORY OF PRESENT ILLNESS  65-year-old male presents the emergency room for fall.   Patient was outside this morning having part of a cigarette and states that he has problems with his right hip after a hip replacement. Patient denied loss of consciousness. He does not have a headache. He did cut his right eyebrow in the fall and has some pain and swelling to his left index finger. Fall occurred just earlier this morning. Patient did not take anything at home for pain. He lives with his daughter who is here with him. MEDICAL DECISION MAKIN-year-old male presents emergency room for right eyebrow laceration and left index finger dislocation after a fall. Patient is alert and oriented. CT imaging of the head is negative for acute abnormality. Laceration and dislocation were repaired here in the ED without complication. Patient is with his daughter who is going to take him home. He is in no distress has stable vitals and no other pain or complaints here today. All questions were answered here from the ED and patient and daughter are comfortable with discharge plan.           Inpatient course: Discharge summary reviewed- see chart. Interval history/Current status: stable    Vitals:    22 0857   BP: 118/74   Pulse: 74   Temp: 97.6 °F (36.4 °C)   TempSrc: Infrared   SpO2: 98%   Weight: 130 lb (59 kg)     Body mass index is 18.13 kg/m². Wt Readings from Last 3 Encounters:   22 130 lb (59 kg)   22 127 lb (57.6 kg)   21 128 lb 3.2 oz (58.2 kg)     BP Readings from Last 3 Encounters:   22 118/74   22 (!) 177/54   21 122/80       Review of Systems   Constitutional: Negative. HENT: Negative. Eyes: Negative. Respiratory: Negative. Cardiovascular: Negative. Gastrointestinal: Negative. Endocrine: Negative. Genitourinary: Negative. Musculoskeletal: Negative. Skin: Negative. Allergic/Immunologic: Negative. Neurological: Negative. Hematological: Negative. Psychiatric/Behavioral: Negative. All other systems reviewed and are negative. Physical Exam  Vitals and nursing note reviewed.    Constitutional:       General: He is not in acute distress. Appearance: Normal appearance. He is underweight. He is not ill-appearing, toxic-appearing or diaphoretic. HENT:      Head: Normocephalic and atraumatic. Right Ear: External ear normal.      Left Ear: External ear normal.   Eyes:      General: No scleral icterus. Right eye: No discharge. Left eye: No discharge. Conjunctiva/sclera: Conjunctivae normal.      Comments: Blind in both eyes   Cardiovascular:      Rate and Rhythm: Normal rate and regular rhythm. Pulses: Normal pulses. Heart sounds: Normal heart sounds. No murmur heard. No friction rub. No gallop. Pulmonary:      Effort: Pulmonary effort is normal. No respiratory distress. Breath sounds: Normal breath sounds. No stridor. No wheezing, rhonchi or rales. Chest:      Chest wall: No tenderness. Skin:     General: Skin is warm. Coloration: Skin is not jaundiced or pale. Findings: Laceration present. Neurological:      Mental Status: He is alert and oriented to person, place, and time. Mental status is at baseline. Psychiatric:         Mood and Affect: Mood normal.         Behavior: Behavior normal.         Thought Content: Thought content normal.         Judgment: Judgment normal.       Patient Name: Rosana Doyle   Medical Record Number: [id number]  Date: 1/18/2022   Time: 9:40 AM   Room/Bed: Room/bed info not found  Suture/ Staple Removal Procedure Note  Indication: Wound healed    Procedure: The patient was placed in the appropriate position and the sutures were removed without difficulty. Other items: Suture count: 4    The patient tolerated the procedure well.     Complications: None            Assessment/Plan:  Problem List Items Addressed This Visit        Circulatory    Essential hypertension       Endocrine    Type 2 diabetes mellitus without complication ([facility] [address]       Musculoskeletal and Integument    Closed right hip fracture (HCC)    Relevant Medications acetaminophen (TYLENOL) 500 MG tablet    Other Relevant Orders    FL DISCHARGE MEDS RECONCILED W/ CURRENT OUTPATIENT MED LIST (Completed)      Other Visit Diagnoses     Routine general medical examination at a health care facility    -  Primary    Facial laceration, subsequent encounter        Relevant Orders    FL DISCHARGE MEDS RECONCILED W/ CURRENT OUTPATIENT MED LIST (Completed)    Vitamin D deficiency        Relevant Medications    Cholecalciferol 50 MCG (2000 UT) TABS    Anxiety        Relevant Medications    sertraline (ZOLOFT) 25 MG tablet              Medical Decision Making: moderate complexity        On the basis of positive falls risk screening, assessment and plan is as follows: home safety tips provided.

## 2022-03-03 ENCOUNTER — TELEPHONE (OUTPATIENT)
Dept: FAMILY MEDICINE CLINIC | Age: 82
End: 2022-03-03

## 2022-03-03 NOTE — TELEPHONE ENCOUNTER
----- Message from Leonarda Severe sent at 3/3/2022 11:02 AM EST -----  Subject: Message to Provider    QUESTIONS  Information for Provider? Evon calling form [facility] to ask   about the standard written order faxed over on 1/31/2022 reference number   [id number] please call back. She will refax it   ---------------------------------------------------------------------------  --------------  CALL BACK INFO  What is the best way for the office to contact you? OK to leave message on   voicemail  Preferred Call Back Phone Number? [phone]  ---------------------------------------------------------------------------  --------------  SCRIPT ANSWERS  Relationship to Patient? Third Party  Representative Name?  [employer]

## 2022-04-08 ENCOUNTER — APPOINTMENT (OUTPATIENT)
Dept: CT IMAGING | Age: 82
DRG: 698 | End: 2022-04-08
Payer: MEDICARE

## 2022-04-08 ENCOUNTER — HOSPITAL ENCOUNTER (INPATIENT)
Age: 82
LOS: 8 days | Discharge: NURSING, REHAB OR LONG-TERM CARE | DRG: 698 | End: 2022-04-16
Attending: STUDENT IN AN ORGANIZED HEALTH CARE EDUCATION/TRAINING PROGRAM | Admitting: STUDENT IN AN ORGANIZED HEALTH CARE EDUCATION/TRAINING PROGRAM
Payer: MEDICARE

## 2022-04-08 DIAGNOSIS — N30.01 ACUTE CYSTITIS WITH HEMATURIA: ICD-10-CM

## 2022-04-08 DIAGNOSIS — R41.82 ALTERED MENTAL STATUS, UNSPECIFIED ALTERED MENTAL STATUS TYPE: Primary | ICD-10-CM

## 2022-04-08 PROBLEM — E44.0 MODERATE PROTEIN-CALORIE MALNUTRITION (HCC): Status: ACTIVE | Noted: 2022-04-08

## 2022-04-08 PROBLEM — N39.0 UTI (URINARY TRACT INFECTION): Status: ACTIVE | Noted: 2022-04-08

## 2022-04-08 PROBLEM — G92.8 TOXIC METABOLIC ENCEPHALOPATHY: Status: ACTIVE | Noted: 2022-04-08

## 2022-04-08 PROBLEM — Z72.0 TOBACCO ABUSE: Status: ACTIVE | Noted: 2022-04-08

## 2022-04-08 LAB
-: ABNORMAL
ABSOLUTE EOS #: 0 K/UL (ref 0–0.4)
ABSOLUTE IMMATURE GRANULOCYTE: 0 K/UL (ref 0–0.3)
ABSOLUTE LYMPH #: 0.36 K/UL (ref 1–4.8)
ABSOLUTE MONO #: 0.54 K/UL (ref 0.2–0.8)
ALBUMIN SERPL-MCNC: 3.8 G/DL (ref 3.5–5.2)
ALP BLD-CCNC: 84 U/L (ref 40–129)
ALT SERPL-CCNC: 13 U/L (ref 5–41)
AMORPHOUS: ABNORMAL
ANION GAP SERPL CALCULATED.3IONS-SCNC: 14 MMOL/L (ref 9–17)
AST SERPL-CCNC: 17 U/L
BACTERIA: ABNORMAL
BASOPHILS # BLD: 0 %
BASOPHILS ABSOLUTE: 0 K/UL (ref 0–0.2)
BILIRUB SERPL-MCNC: 0.43 MG/DL (ref 0.3–1.2)
BILIRUBIN URINE: NEGATIVE
BUN BLDV-MCNC: 34 MG/DL (ref 8–23)
BUN/CREAT BLD: 33 (ref 9–20)
CALCIUM SERPL-MCNC: 8.9 MG/DL (ref 8.6–10.4)
CHLORIDE BLD-SCNC: 100 MMOL/L (ref 98–107)
CO2: 22 MMOL/L (ref 20–31)
COLOR: YELLOW
CREAT SERPL-MCNC: 1.02 MG/DL (ref 0.7–1.2)
EOSINOPHILS RELATIVE PERCENT: 0 % (ref 1–4)
EPITHELIAL CELLS UA: ABNORMAL /HPF (ref 0–5)
GFR AFRICAN AMERICAN: >60 ML/MIN
GFR NON-AFRICAN AMERICAN: >60 ML/MIN
GFR SERPL CREATININE-BSD FRML MDRD: ABNORMAL ML/MIN/{1.73_M2}
GLUCOSE BLD-MCNC: 190 MG/DL (ref 70–99)
GLUCOSE URINE: NEGATIVE
HCT VFR BLD CALC: 41.4 % (ref 40.7–50.3)
HEMOGLOBIN: 13.2 G/DL (ref 13–17)
IMMATURE GRANULOCYTES: 0 %
INR BLD: 1
KETONES, URINE: NEGATIVE
LACTIC ACID: 1.8 MMOL/L (ref 0.5–2.2)
LEUKOCYTE ESTERASE, URINE: ABNORMAL
LYMPHOCYTES # BLD: 4 % (ref 24–44)
MCH RBC QN AUTO: 29.9 PG (ref 25.2–33.5)
MCHC RBC AUTO-ENTMCNC: 31.9 G/DL (ref 28.4–34.8)
MCV RBC AUTO: 93.9 FL (ref 82.6–102.9)
MONOCYTES # BLD: 6 % (ref 1–7)
MORPHOLOGY: ABNORMAL
MORPHOLOGY: ABNORMAL
NITRITE, URINE: NEGATIVE
NRBC AUTOMATED: 0 PER 100 WBC
PARTIAL THROMBOPLASTIN TIME: 35 SEC (ref 23.9–33.8)
PDW BLD-RTO: 14.5 % (ref 11.8–14.4)
PH UA: 5.5 (ref 5–8)
PLATELET # BLD: 196 K/UL (ref 138–453)
PMV BLD AUTO: 10.2 FL (ref 8.1–13.5)
POTASSIUM SERPL-SCNC: 4.2 MMOL/L (ref 3.7–5.3)
PROTEIN UA: ABNORMAL
PROTHROMBIN TIME: 13.4 SEC (ref 11.5–14.2)
RBC # BLD: 4.41 M/UL (ref 4.21–5.77)
RBC UA: ABNORMAL /HPF (ref 0–2)
SEG NEUTROPHILS: 90 % (ref 36–66)
SEGMENTED NEUTROPHILS ABSOLUTE COUNT: 8.1 K/UL (ref 1.8–7.7)
SODIUM BLD-SCNC: 136 MMOL/L (ref 135–144)
SPECIFIC GRAVITY UA: 1.02 (ref 1–1.03)
TOTAL PROTEIN: 6.5 G/DL (ref 6.4–8.3)
TURBIDITY: ABNORMAL
URINE HGB: ABNORMAL
UROBILINOGEN, URINE: NORMAL
WBC # BLD: 9 K/UL (ref 3.5–11.3)
WBC UA: ABNORMAL /HPF (ref 0–5)

## 2022-04-08 PROCEDURE — 99223 1ST HOSP IP/OBS HIGH 75: CPT | Performed by: STUDENT IN AN ORGANIZED HEALTH CARE EDUCATION/TRAINING PROGRAM

## 2022-04-08 PROCEDURE — 2580000003 HC RX 258: Performed by: STUDENT IN AN ORGANIZED HEALTH CARE EDUCATION/TRAINING PROGRAM

## 2022-04-08 PROCEDURE — 6370000000 HC RX 637 (ALT 250 FOR IP): Performed by: NURSE PRACTITIONER

## 2022-04-08 PROCEDURE — 97535 SELF CARE MNGMENT TRAINING: CPT

## 2022-04-08 PROCEDURE — 51702 INSERT TEMP BLADDER CATH: CPT

## 2022-04-08 PROCEDURE — 96365 THER/PROPH/DIAG IV INF INIT: CPT

## 2022-04-08 PROCEDURE — 84145 PROCALCITONIN (PCT): CPT

## 2022-04-08 PROCEDURE — 85730 THROMBOPLASTIN TIME PARTIAL: CPT

## 2022-04-08 PROCEDURE — 97530 THERAPEUTIC ACTIVITIES: CPT

## 2022-04-08 PROCEDURE — 87040 BLOOD CULTURE FOR BACTERIA: CPT

## 2022-04-08 PROCEDURE — 87086 URINE CULTURE/COLONY COUNT: CPT

## 2022-04-08 PROCEDURE — 87077 CULTURE AEROBIC IDENTIFY: CPT

## 2022-04-08 PROCEDURE — 83605 ASSAY OF LACTIC ACID: CPT

## 2022-04-08 PROCEDURE — 85025 COMPLETE CBC W/AUTO DIFF WBC: CPT

## 2022-04-08 PROCEDURE — 85610 PROTHROMBIN TIME: CPT

## 2022-04-08 PROCEDURE — 6360000002 HC RX W HCPCS: Performed by: NURSE PRACTITIONER

## 2022-04-08 PROCEDURE — 6370000000 HC RX 637 (ALT 250 FOR IP): Performed by: STUDENT IN AN ORGANIZED HEALTH CARE EDUCATION/TRAINING PROGRAM

## 2022-04-08 PROCEDURE — 80053 COMPREHEN METABOLIC PANEL: CPT

## 2022-04-08 PROCEDURE — 1200000000 HC SEMI PRIVATE

## 2022-04-08 PROCEDURE — 81001 URINALYSIS AUTO W/SCOPE: CPT

## 2022-04-08 PROCEDURE — 97163 PT EVAL HIGH COMPLEX 45 MIN: CPT

## 2022-04-08 PROCEDURE — 70450 CT HEAD/BRAIN W/O DYE: CPT

## 2022-04-08 PROCEDURE — 97167 OT EVAL HIGH COMPLEX 60 MIN: CPT

## 2022-04-08 PROCEDURE — 87186 SC STD MICRODIL/AGAR DIL: CPT

## 2022-04-08 PROCEDURE — 97116 GAIT TRAINING THERAPY: CPT

## 2022-04-08 PROCEDURE — 99283 EMERGENCY DEPT VISIT LOW MDM: CPT

## 2022-04-08 PROCEDURE — 96361 HYDRATE IV INFUSION ADD-ON: CPT

## 2022-04-08 PROCEDURE — 6360000002 HC RX W HCPCS: Performed by: STUDENT IN AN ORGANIZED HEALTH CARE EDUCATION/TRAINING PROGRAM

## 2022-04-08 RX ORDER — LIDOCAINE HYDROCHLORIDE 20 MG/ML
5 JELLY TOPICAL ONCE
Status: DISCONTINUED | OUTPATIENT
Start: 2022-04-08 | End: 2022-04-16 | Stop reason: HOSPADM

## 2022-04-08 RX ORDER — TAMSULOSIN HYDROCHLORIDE 0.4 MG/1
0.8 CAPSULE ORAL DAILY
Status: DISCONTINUED | OUTPATIENT
Start: 2022-04-08 | End: 2022-04-16 | Stop reason: HOSPADM

## 2022-04-08 RX ORDER — ACETAMINOPHEN 325 MG/1
650 TABLET ORAL EVERY 6 HOURS PRN
Status: DISCONTINUED | OUTPATIENT
Start: 2022-04-08 | End: 2022-04-16 | Stop reason: HOSPADM

## 2022-04-08 RX ORDER — ONDANSETRON 4 MG/1
4 TABLET, ORALLY DISINTEGRATING ORAL EVERY 8 HOURS PRN
Status: DISCONTINUED | OUTPATIENT
Start: 2022-04-08 | End: 2022-04-16 | Stop reason: HOSPADM

## 2022-04-08 RX ORDER — SODIUM CHLORIDE 9 MG/ML
INJECTION, SOLUTION INTRAVENOUS PRN
Status: DISCONTINUED | OUTPATIENT
Start: 2022-04-08 | End: 2022-04-16 | Stop reason: HOSPADM

## 2022-04-08 RX ORDER — IBUPROFEN 200 MG
400 CAPSULE ORAL
Status: DISCONTINUED | OUTPATIENT
Start: 2022-04-08 | End: 2022-04-08 | Stop reason: RX

## 2022-04-08 RX ORDER — VITAMIN B COMPLEX
2000 TABLET ORAL DAILY
Status: DISCONTINUED | OUTPATIENT
Start: 2022-04-08 | End: 2022-04-16 | Stop reason: HOSPADM

## 2022-04-08 RX ORDER — ONDANSETRON 2 MG/ML
4 INJECTION INTRAMUSCULAR; INTRAVENOUS EVERY 6 HOURS PRN
Status: DISCONTINUED | OUTPATIENT
Start: 2022-04-08 | End: 2022-04-16 | Stop reason: HOSPADM

## 2022-04-08 RX ORDER — ACETAMINOPHEN 650 MG/1
650 SUPPOSITORY RECTAL EVERY 6 HOURS PRN
Status: DISCONTINUED | OUTPATIENT
Start: 2022-04-08 | End: 2022-04-16 | Stop reason: HOSPADM

## 2022-04-08 RX ORDER — OXYCODONE HYDROCHLORIDE AND ACETAMINOPHEN 5; 325 MG/1; MG/1
1 TABLET ORAL EVERY 4 HOURS PRN
Status: DISCONTINUED | OUTPATIENT
Start: 2022-04-08 | End: 2022-04-16 | Stop reason: HOSPADM

## 2022-04-08 RX ORDER — LATANOPROST 50 UG/ML
1 SOLUTION/ DROPS OPHTHALMIC NIGHTLY
Status: DISCONTINUED | OUTPATIENT
Start: 2022-04-08 | End: 2022-04-16 | Stop reason: HOSPADM

## 2022-04-08 RX ORDER — FINASTERIDE 5 MG/1
5 TABLET, FILM COATED ORAL DAILY
Status: DISCONTINUED | OUTPATIENT
Start: 2022-04-08 | End: 2022-04-16 | Stop reason: HOSPADM

## 2022-04-08 RX ORDER — POLYETHYLENE GLYCOL 3350 17 G/17G
17 POWDER, FOR SOLUTION ORAL DAILY PRN
Status: DISCONTINUED | OUTPATIENT
Start: 2022-04-08 | End: 2022-04-16 | Stop reason: HOSPADM

## 2022-04-08 RX ORDER — NICOTINE 21 MG/24HR
1 PATCH, TRANSDERMAL 24 HOURS TRANSDERMAL DAILY
Status: DISCONTINUED | OUTPATIENT
Start: 2022-04-08 | End: 2022-04-16 | Stop reason: HOSPADM

## 2022-04-08 RX ORDER — FINASTERIDE 5 MG/1
5 TABLET, FILM COATED ORAL DAILY
COMMUNITY

## 2022-04-08 RX ORDER — QUETIAPINE FUMARATE 25 MG/1
25 TABLET, FILM COATED ORAL ONCE
Status: COMPLETED | OUTPATIENT
Start: 2022-04-08 | End: 2022-04-08

## 2022-04-08 RX ORDER — SODIUM CHLORIDE 0.9 % (FLUSH) 0.9 %
5-40 SYRINGE (ML) INJECTION PRN
Status: DISCONTINUED | OUTPATIENT
Start: 2022-04-08 | End: 2022-04-16 | Stop reason: HOSPADM

## 2022-04-08 RX ORDER — 0.9 % SODIUM CHLORIDE 0.9 %
1000 INTRAVENOUS SOLUTION INTRAVENOUS ONCE
Status: COMPLETED | OUTPATIENT
Start: 2022-04-08 | End: 2022-04-08

## 2022-04-08 RX ORDER — SODIUM CHLORIDE 9 MG/ML
INJECTION, SOLUTION INTRAVENOUS CONTINUOUS
Status: DISCONTINUED | OUTPATIENT
Start: 2022-04-08 | End: 2022-04-10

## 2022-04-08 RX ORDER — SERTRALINE HYDROCHLORIDE 25 MG/1
25 TABLET, FILM COATED ORAL DAILY
Status: DISCONTINUED | OUTPATIENT
Start: 2022-04-08 | End: 2022-04-16 | Stop reason: HOSPADM

## 2022-04-08 RX ORDER — TAMSULOSIN HYDROCHLORIDE 0.4 MG/1
0.4 CAPSULE ORAL NIGHTLY
COMMUNITY

## 2022-04-08 RX ORDER — SODIUM CHLORIDE 0.9 % (FLUSH) 0.9 %
5-40 SYRINGE (ML) INJECTION EVERY 12 HOURS SCHEDULED
Status: DISCONTINUED | OUTPATIENT
Start: 2022-04-08 | End: 2022-04-16 | Stop reason: HOSPADM

## 2022-04-08 RX ADMIN — Medication 2000 UNITS: at 09:46

## 2022-04-08 RX ADMIN — TAMSULOSIN HYDROCHLORIDE 0.8 MG: 0.4 CAPSULE ORAL at 09:46

## 2022-04-08 RX ADMIN — FINASTERIDE 5 MG: 5 TABLET, FILM COATED ORAL at 15:59

## 2022-04-08 RX ADMIN — LATANOPROST 1 DROP: 50 SOLUTION OPHTHALMIC at 21:11

## 2022-04-08 RX ADMIN — SERTRALINE 25 MG: 25 TABLET, FILM COATED ORAL at 09:46

## 2022-04-08 RX ADMIN — CEFEPIME HYDROCHLORIDE 2000 MG: 2 INJECTION, POWDER, FOR SOLUTION INTRAVENOUS at 04:48

## 2022-04-08 RX ADMIN — SODIUM CHLORIDE 1000 ML: 9 INJECTION, SOLUTION INTRAVENOUS at 03:52

## 2022-04-08 RX ADMIN — QUETIAPINE FUMARATE 25 MG: 25 TABLET ORAL at 13:29

## 2022-04-08 RX ADMIN — ENOXAPARIN SODIUM 40 MG: 100 INJECTION SUBCUTANEOUS at 09:46

## 2022-04-08 RX ADMIN — OXYCODONE HYDROCHLORIDE AND ACETAMINOPHEN 1 TABLET: 5; 325 TABLET ORAL at 13:29

## 2022-04-08 RX ADMIN — SODIUM CHLORIDE: 9 INJECTION, SOLUTION INTRAVENOUS at 08:25

## 2022-04-08 RX ADMIN — ACETAMINOPHEN 325MG 650 MG: 325 TABLET ORAL at 11:53

## 2022-04-08 ASSESSMENT — ENCOUNTER SYMPTOMS
ABDOMINAL DISTENTION: 0
CHEST TIGHTNESS: 0
SHORTNESS OF BREATH: 0
ABDOMINAL PAIN: 0
COUGH: 0
BACK PAIN: 0
COLOR CHANGE: 0

## 2022-04-08 ASSESSMENT — PAIN DESCRIPTION - PAIN TYPE: TYPE: ACUTE PAIN

## 2022-04-08 ASSESSMENT — PAIN DESCRIPTION - LOCATION: LOCATION: GROIN

## 2022-04-08 ASSESSMENT — PAIN SCALES - GENERAL
PAINLEVEL_OUTOF10: 8
PAINLEVEL_OUTOF10: 0
PAINLEVEL_OUTOF10: 8
PAINLEVEL_OUTOF10: 6
PAINLEVEL_OUTOF10: 8
PAINLEVEL_OUTOF10: 6
PAINLEVEL_OUTOF10: 0
PAINLEVEL_OUTOF10: 0

## 2022-04-08 NOTE — PROGRESS NOTES
Occupational Therapy   Occupational Therapy Initial Assessment  Date: 2022   Patient Name: Beto Duarte  MRN: [id number]     : 1940    RN Laxmi Milian reports patient is medically stable for therapy treatment this date. Chart reviewed prior to treatment and patient is agreeable for therapy. All lines intact and patient positioned comfortably at end of treatment. All patient needs addressed prior to ending therapy session. Date of Service: 2022    Discharge Recommendations:  Patient would benefit from continued therapy after discharge   Pt currently functioning below baseline. Would suggest additional therapy at time of discharge to maximize long term outcomes and prevent re-admission. Please refer to AM-PAC score for current level of function. OT Equipment Recommendations  Equipment Needed: Yes  Mobility Devices: ADL Assistive Devices  ADL Assistive Devices: Long-handled Shoe Horn;Emergency Alert System; Reacher;Sock-Aid Hard;Long-handled Sponge    Assessment   Performance deficits / Impairments: Decreased functional mobility ; Decreased ADL status; Decreased safe awareness;Decreased cognition;Decreased endurance;Decreased high-level IADLs;Decreased posture;Decreased balance;Decreased fine motor control;Decreased strength  Assessment: Pt would benefit from continued skilled OT services to increase I and safety during functional tasks to return home at [facility] as able.   Prognosis: Good  Decision Making: Medium Complexity  OT Education: OT Role;Transfer Training;Energy Conservation;Plan of Care;ADL Adaptive Strategies  Patient Education: fall prevention/call light use, benefits of being oob, recommendations for continued therapy, OT POC, safety in function  REQUIRES OT FOLLOW UP: Yes  Activity Tolerance  Activity Tolerance: Patient Tolerated treatment well;Treatment limited secondary to decreased cognition  Activity Tolerance: fair  Safety Devices  Safety Devices in place: Yes  Type of devices: Nurse notified;Gait belt;Bed alarm in place;Call light within reach; Patient at risk for falls; Left in bed (waffle mattress reinflated)           Patient Diagnosis(es): The primary encounter diagnosis was Altered mental status, unspecified altered mental status type. A diagnosis of Acute cystitis with hematuria was also pertinent to this visit. has a past medical history of Hip fracture ([address] has a past surgical history that includes eye surgery. PER H&P: Maria Guadalupe Hyman is a [de-identified] y.o. male who presents with altered mental status, indwelling Worley catheter due to urinary retention following with Dr. Paige Burrell. Has had multiple attempts at Worley removal however continues to have urinary retention. Reported BPH. Family reporting that patient has become increasingly confused and that they are supposed to have the Worley changed in a few days. Restrictions  Restrictions/Precautions  Restrictions/Precautions: General Precautions,Fall Risk (Legally blind)  Required Braces or Orthoses?: No  Position Activity Restriction  Other position/activity restrictions: Legally blind (can see light), Foely cath, easily agitated, up with assist, LUE IV, ALARMS, easily agitiated    Subjective   General  Chart Reviewed: Yes  Patient assessed for rehabilitation services?: Yes  Family / Caregiver Present: No  Subjective  Subjective: \"here we go again, therapy 101\"  General Comment  Comments: Pt sitting on EOB using bed pan upon writers arrival. PCT and nursing students in room assisting.  Pt agreeable to OT eval with some encouragement  Patient Currently in Pain: Yes      Social/Functional History  Social/Functional History  Lives With: Daughter (and son in law; (mentioned that six people live in apartment unable to specify))  Type of Home: Apartment  Home Layout: Two level  Home Access: Stairs to enter with rails  Entrance Stairs - Number of Steps: 7-8  Entrance Stairs - Rails: Right  Bathroom Shower/Tub: Tub/Shower unit  Bathroom Equipment: Shower chair (sink will be close by for support)  Home Equipment: 4 wheeled walker,Cane  ADL Assistance: Needs assistance (gets assistance with showering)  Homemaking Assistance: Needs assistance (family does house work)  Ambulation Assistance: Independent (does not use AD in house, RW in community)  Transfer Assistance: Independent  Active : No  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 Pt reports \"oh yeah I've fallen lately, here and there, I can't lie about that it happens\" unable to give specifics       Objective   Vision: Impaired (has light perception)  Vision Exceptions: Legally blind;Wears glasses at all times  Hearing: Within functional limits    Orientation  Overall Orientation Status: Within Functional Limits  Observation/Palpation  Posture: Fair (Trunk flexed posture)  Observation: frail, disheveled appearance, easily agitiated  Edema: none       Balance  Sitting Balance: Stand by assistance  Standing Balance: Moderate assistance (x2 staff assist with RW)  Standing Balance  Time: standing ~ 4-5  min  Activity: ADLs and functional mobility  Functional Mobility  Functional - Mobility Device: Rolling Walker  Activity:  (L side of bed around to R side of bed)  Assist Level: Moderate assistance (x2 staff assist)  Functional Mobility Comments: Pt required MAX verbal cues/tactile assist for navigation/RW mgmt, RW safety, upright posture, slowing down, line mgmt, pacing self and pursed lip breathing all to increase safety. ADL  Feeding: Setup;Minimal assistance  Grooming: Setup;Stand by assistance;Verbal cueing  UE Bathing: Setup;Minimal assistance;Verbal cueing  LE Bathing: Setup; Moderate assistance;Verbal cueing  UE Dressing: Setup; Moderate assistance;Verbal cueing (for doffing/donning hosp gown seated on EOB)  LE Dressing: Setup;Maximum assistance;Dependent/Total (for donning brief standing with RW x2 staff assist)  Toileting: Dependent/Total (Worley cath, Dep for posterior boom hygiene after BM in bed pan x2 staff assist to stand and complete safely)  Additional Comments: Pt is limited by poor vision, cognition and agitation. Tone RUE  RUE Tone: Normotonic  Tone LUE  LUE Tone: Normotonic  Coordination  Movements Are Fluid And Coordinated: No  Coordination and Movement description: Fine motor impairments;Decreased speed;Decreased accuracy; Right UE;Left UE        Bed mobility  Rolling to Left: Modified independent  Supine to Sit: Unable to assess (pt sitting on EOB upon writers arrival)  Sit to Supine: Stand by assistance  Scooting: Stand by assistance  Comment: Pt reqoured Mod verbal cues for slowing down, pacing self and use of bedrails. Assistance needed for all line mgmt. Transfers  Sit to stand: Minimal assistance;2 Person assistance (with RW)  Stand to sit: Minimal assistance;2 Person assistance  Transfer Comments: Pt given Max verbal cues/tactile assist for upright posture, controlled stand to sit, squaring self/AD up to surface and reaching back prior to sitting, pacing self, slowing down movements, and awareness/assist with lines all to increase safety. Cognition  Overall Cognitive Status: Exceptions  Arousal/Alertness: Appropriate responses to stimuli  Following Commands: Follows multistep commands with repitition; Follows multistep commands with increased time  Attention Span: Appears intact  Memory: Appears intact  Safety Judgement: Decreased awareness of need for safety;Decreased awareness of need for assistance  Problem Solving: Decreased awareness of errors;Assistance required to identify errors made;Assistance required to correct errors made  Insights: Decreased awareness of deficits  Initiation: Requires cues for some  Sequencing: Requires cues for some Sensation  Overall Sensation Status: WNL        LUE AROM (degrees)  LUE AROM : WFL  RUE AROM (degrees)  RUE AROM : WFL  LUE Strength  Gross LUE Strength: WFL  LUE Strength Comment: ~ 4-/5  RUE Strength  Gross RUE Strength: WFL  RUE Strength Comment: ~ 4-/5                   Plan   Plan  Times per week: 4-5x/wk 1x/day as katelynn  Current Treatment Recommendations: Strengthening,Endurance Training,Balance Training,Functional Mobility Training,Safety Education & Training,Pain Management,Home Management Training,Self-Care / ADL,Equipment Evaluation, Education, & procurement,Patient/Caregiver Education & Training                          AM-PAC Score        AM-PAC Inpatient Daily Activity Raw Score: 12 (04/08/22 1504)  AM-PAC Inpatient ADL T-Scale Score : 30.6 (04/08/22 1504)  ADL Inpatient CMS 0-100% Score: 66.57 (04/08/22 1504)  ADL Inpatient CMS G-Code Modifier : CL (04/08/22 1504)    Goals  Short term goals  Time Frame for Short term goals: By discharge, pt to demo  Short term goal 1: ADL transfers and functional mobility to SBA with use of AD as needed. Short term goal 2: increased B UE strength by 1/2 grade to assist with functional tasks/ I with simple B UE HEP. Short term goal 3: toileting to SBA with use of AD/grab bars /BSC as needed. Short term goal 4: UB ADLs to Set up and LB ADLs to Min A with use of AD/AE as needed. Short term goal 5: bed mobility to Mod I with use of bedrails as needed. Long term goals  Long term goal 1: Pt to be I with fall prevention education, EC/WS tech, recommendations for AE/discharge with use of handouts as needed. Patient Goals   Patient goals : To go home! Therapy Time   Individual Concurrent Group Co-treatment   Time In 1400         Time Out 1440         Minutes 40          tx time: 30 min    Co-treatment with PT warranted secondary to decreased safety and independence requiring 2 skilled therapy professionals to address individual discipline's goals.         Stiven Otto Tim Mao

## 2022-04-08 NOTE — PROGRESS NOTES
Pt admitted to room 2008 per cart in stable condition from ED. Oriented to room and surroundings  Bed in lowest position, wheels locked, 2/4 side rails up  Call light in reach, room free of clutter, adequate lighting provided  Denies any further questions at this time  Instructed to call out with any questions/concerns/new onset of pain and/or n/v   White board updated  Continue to monitor with hourly rounding  STAY WITH ME protocol initiated   Bed alarm on/Fall Risk signs in place/Fall risk sticker to wrist band  Non-skid socks on/at bedside  [address] in place for patient/family to view & ask questions.

## 2022-04-08 NOTE — H&P
[facility]  Office: [address], , Bulmaro Susan, DO, Leonel Pitt, DO, Sangita Odonnell, DO, Darby De Anda MD, Kevin Padron MD, Mandy Zavaleta MD, Esme Gilman MD, Luis Carlos Nascimento MD, Eliazar Gipson MD, Sharif Hodge MD, Sanya Marquez, DO, Marylin Mcguire, DO, Sima Wolfe MD,  Rhoda Doss, DO, Herlinda Quick MD, Ruba Vargas MD, Sid Florence MD, Gita Kasper DO, Zenia Geller MD, Brent José MD, Adrian Delgadillo, [facility], [facility], [facility], Reuben Quintanilla, CNP, Miguel Williamson, CNP, Nolan Coleman, CNP, Barbara Amezquita, CNP, Arnel Dumont, CNP, Lula Mackey PA-C, [place] Valerio, [facility], Ari Desai, CNP, Jocy Larios, CNP, Ambrocio Rae, CNP, Marisela Taylor, CNS, Luke Levi, [facility], Kimmie Gerber, CNP, Lupe Jackman, CNP, Ema Arciniega, [address]    HISTORY AND PHYSICAL EXAMINATION            Date:   4/8/2022  Patient name:  Nancy Brantley  Date of admission:  4/8/2022  2:52 AM  MRN:   [id number]  Account:  [de-identified]  YOB: 1940  PCP:    Ruddy Velazquez DO  Room:   2008/2008-02  Code Status:    Full Code    Chief Complaint:     Chief Complaint   Patient presents with    Altered Mental Status    Urinary Tract Infection       History Obtained From:     patient, electronic medical record    History of Present Illness:     Nancy Brantley is a [de-identified] y.o. Non- / non  male who presents with Altered Mental Status and Urinary Tract Infection   and is admitted to the hospital for the management of UTI (urinary tract infection). 65-year-old male with known medical history of chronic urinary retention, on chronic Worley catheter presents to the hospital for altered mental status. As per family patient has been acting more confused for last 2 days. Patient gets his Worley changed every month. Per chart review patient had cloudy urine and was acting very confused.   On my evaluation patient is oriented to place and self but not very cooperative in my history and exam.  States that he has pain in the scrotal area. Has chronic Worley in place. Patient had tmax of 100.2 on arrival. Seen to have uti  Past Medical History:     Past Medical History:   Diagnosis Date    Hip fracture [facility])         Past Surgical History:     Past Surgical History:   Procedure Laterality Date    EYE SURGERY          Medications Prior to Admission:     Prior to Admission medications    Medication Sig Start Date End Date Taking? Authorizing Provider   finasteride (PROSCAR) 5 MG tablet Take 5 mg by mouth daily   Yes Historical Provider, MD   calcium citrate (CALCITRATE) 950 (200 Ca) MG tablet Take 400 mg by mouth 2 times daily  12/23/21   Historical Provider, MD   acetaminophen (TYLENOL) 500 MG tablet Take 1,000 mg by mouth every 6 hours as needed 11/30/21   Historical Provider, MD   Cholecalciferol 50 MCG (2000 UT) TABS Take 1 tablet by mouth daily 1/18/22   Kali Edwards DO   sertraline (ZOLOFT) 25 MG tablet Take 1 tablet by mouth daily 1/18/22   Kali Edwards DO   metFORMIN (GLUCOPHAGE) 850 MG tablet Take 1 tablet by mouth 2 times daily (with meals) 12/27/21   Kali Edwards DO   tamsulosin [facility]) 0.4 MG capsule Take 2 capsules by mouth daily  Patient taking differently: Take 0.4 mg by mouth daily  8/27/21   Kali Edwards DO   latanoprost (XALATAN) 0.005 % ophthalmic solution Administer 1 drop to the right eye nightly. 10/27/16   Historical Provider, MD        Allergies:     Patient has no known allergies. Social History:     Tobacco:    reports that he has been smoking cigarettes. He has a 60.00 pack-year smoking history. He has never used smokeless tobacco.  Alcohol:      reports no history of alcohol use. Drug Use:  reports current drug use. Drug: Marijuana [facility]).     Family History:     Family History   Problem Relation Age of Onset    High Blood Pressure Mother  Heart Disease Mother     Kidney Disease Mother     Other Sister         obesity       Review of Systems:     Positive and Negative as described in HPI. Review of system not very accurate given mentation    Review of Systems   Constitutional: Positive for activity change, diaphoresis and fatigue. HENT: Negative for congestion and dental problem. Respiratory: Negative for cough, chest tightness and shortness of breath. Cardiovascular: Negative for chest pain and leg swelling. Gastrointestinal: Negative for abdominal distention and abdominal pain. Genitourinary: Positive for difficulty urinating, dysuria, penile pain and urgency. Negative for flank pain. Musculoskeletal: Negative for arthralgias, back pain and gait problem. Skin: Negative for color change and pallor. Neurological: Negative for dizziness and facial asymmetry. Psychiatric/Behavioral: Positive for agitation, behavioral problems, confusion and decreased concentration. Physical Exam:   BP (!) 169/85   Pulse 87   Temp 98.4 °F (36.9 °C) (Oral)   Resp 20   Ht 5' 11\" (1.803 m)   Wt 139 lb 12.8 oz (63.4 kg)   SpO2 90%   BMI 19.50 kg/m²   Temp (24hrs), Av.9 °F (37.2 °C), Min:98.1 °F (36.7 °C), Max:100.2 °F (37.9 °C)    No results for input(s): POCGLU in the last 72 hours. No intake or output data in the 24 hours ending 22 1336    Physical Exam  Constitutional:       General: He is not in acute distress. Appearance: He is not ill-appearing. Comments: Confused, but oriented to place  Waxing and waning confusion  Intermittent agitation   HENT:      Head: Normocephalic and atraumatic. Right Ear: External ear normal.      Left Ear: External ear normal.      Nose: Nose normal.      Mouth/Throat:      Mouth: Mucous membranes are moist.   Eyes:      Comments: Blind   Cardiovascular:      Rate and Rhythm: Normal rate and regular rhythm. Pulses: Normal pulses.    Pulmonary:      Effort: Pulmonary effort is normal.      Breath sounds: No wheezing or rales. Musculoskeletal:      Cervical back: Normal range of motion. Right lower leg: No edema. Left lower leg: No edema. Skin:     General: Skin is warm. Coloration: Skin is not jaundiced or pale. Neurological:      General: No focal deficit present. Comments: oriented to self and place   Psychiatric:         Mood and Affect: Mood normal.         Investigations:      Laboratory Testing:  Recent Results (from the past 24 hour(s))   Culture, Blood 1    Collection Time: 04/08/22  3:10 AM    Specimen: Blood   Result Value Ref Range    Specimen Description . BLOOD     Special Requests LT FOREARM 10ML     Culture NO GROWTH <24 HRS    CBC with Auto Differential    Collection Time: 04/08/22  3:10 AM   Result Value Ref Range    WBC 9.0 3.5 - 11.3 k/uL    RBC 4.41 4.21 - 5.77 m/uL    Hemoglobin 13.2 13.0 - 17.0 g/dL    Hematocrit 41.4 40.7 - 50.3 %    MCV 93.9 82.6 - 102.9 fL    MCH 29.9 25.2 - 33.5 pg    MCHC 31.9 28.4 - 34.8 g/dL    RDW 14.5 (H) 11.8 - 14.4 %    Platelets 204 822 - 266 k/uL    MPV 10.2 8.1 - 13.5 fL    NRBC Automated 0.0 0.0 per 100 WBC    Seg Neutrophils 90 (H) 36 - 66 %    Lymphocytes 4 (L) 24 - 44 %    Monocytes 6 1 - 7 %    Eosinophils % 0 (L) 1 - 4 %    Basophils 0 %    Immature Granulocytes 0 0 %    Segs Absolute 8.10 (H) 1.8 - 7.7 k/uL    Absolute Lymph # 0.36 (L) 1.0 - 4.8 k/uL    Absolute Mono # 0.54 0.2 - 0.8 k/uL    Absolute Eos # 0.00 0.0 - 0.4 k/uL    Basophils Absolute 0.00 0.0 - 0.2 k/uL    Absolute Immature Granulocyte 0.00 0.00 - 0.30 k/uL    Morphology INCREASED BANDS PRESENT     Morphology TOXIC GRANULATION PRESENT    Comprehensive Metabolic Panel    Collection Time: 04/08/22  3:10 AM   Result Value Ref Range    Glucose 190 (H) 70 - 99 mg/dL    BUN 34 (H) 8 - 23 mg/dL    CREATININE 1.02 0.70 - 1.20 mg/dL    Bun/Cre Ratio 33 (H) 9 - 20    Calcium 8.9 8.6 - 10.4 mg/dL    Sodium 136 135 - 144 mmol/L    Potassium 4.2 3.7 - 5.3 mmol/L    Chloride 100 98 - 107 mmol/L    CO2 22 20 - 31 mmol/L    Anion Gap 14 9 - 17 mmol/L    Alkaline Phosphatase 84 40 - 129 U/L    ALT 13 5 - 41 U/L    AST 17 <40 U/L    Total Bilirubin 0.43 0.3 - 1.2 mg/dL    Total Protein 6.5 6.4 - 8.3 g/dL    Albumin 3.8 3.5 - 5.2 g/dL    GFR Non-African American >60 >60 mL/min    GFR African American >60 >60 mL/min    GFR Comment         APTT    Collection Time: 04/08/22  3:10 AM   Result Value Ref Range    PTT 35.0 (H) 23.9 - 33.8 sec   Protime-INR    Collection Time: 04/08/22  3:10 AM   Result Value Ref Range    Protime 13.4 11.5 - 14.2 sec    INR 1.0    Culture, Blood 1    Collection Time: 04/08/22  4:20 AM    Specimen: Blood   Result Value Ref Range    Specimen Description . BLOOD     Special Requests R FA 8ML     Culture NO GROWTH <24 HRS    Urinalysis with Microscopic    Collection Time: 04/08/22  4:20 AM   Result Value Ref Range    Color, UA Yellow Yellow    Turbidity UA SLIGHTLY CLOUDY (A) Clear    Glucose, Ur NEGATIVE NEGATIVE    Bilirubin Urine NEGATIVE NEGATIVE    Ketones, Urine NEGATIVE NEGATIVE    Specific Gravity, UA 1.025 1.005 - 1.030    Urine Hgb 3+ (A) NEGATIVE    pH, UA 5.5 5.0 - 8.0    Protein, UA 1+ (A) NEGATIVE    Urobilinogen, Urine Normal Normal    Nitrite, Urine NEGATIVE NEGATIVE    Leukocyte Esterase, Urine SMALL (A) NEGATIVE    -          WBC, UA 20 TO 50 0 - 5 /HPF    RBC, UA TOO NUMEROUS TO COUNT 0 - 2 /HPF    Epithelial Cells UA 2 TO 5 0 - 5 /HPF    Bacteria, UA MODERATE (A) None    Amorphous, UA 1+ (A) None   Lactic Acid    Collection Time: 04/08/22  4:20 AM   Result Value Ref Range    Lactic Acid 1.8 0.5 - 2.2 mmol/L       Imaging/Diagnostics:  CT HEAD WO CONTRAST    Result Date: 4/8/2022  1. No acute intracranial abnormality. 2. Mild-to-moderate global parenchymal volume loss with chronic microvascular ischemic changes. 3. Scattered atherosclerosis of the intracranial vasculature.        Assessment :      Hospital Problems           Last Modified POA    * (Principal) UTI (urinary tract infection) 4/8/2022 Yes    Type 2 diabetes mellitus, without long-term current use of insulin ([facility] [address] 4/8/2022 Yes    Essential hypertension 4/8/2022 Yes    Advanced open-angle glaucoma 4/8/2022 Yes    Benign non-nodular prostatic hyperplasia with lower urinary tract symptoms 4/8/2022 Yes    Overview Signed 8/27/2021  1:08 PM by Richard Koyanagi, DO     Formatting of this note might be different from the original.  luts - discussed options. He has cut down some on caffeine but not completely. His symptoms are somewhat improved. He is not bothered by them at this point and because of this does not want any further intervention. Moderate protein-calorie malnutrition ([facility] [address] 4/8/2022 Yes    Toxic metabolic encephalopathy 0/2/2463 Yes          Plan:     Patient status inpatient in the  Med/Surge    Toxic metabolic encephalopathy with UTI-UTI likely secondary to chronic Worley, Worley catheter changed 4/7, continue cefepime IV, send blood cultures and urine cultures, monitor for response. Seroquel 1 dose for agitation. Continue IV hydration. BPH-continue finasteride and Flomax, follows up with urology outpatient    Type 2 diabetes mellitus-on Metformin, continue in the hospital, POCT blood sugar checks at meals and at bedtime. Hypertension- continue to monitor, was hypotensive on arrival, elevated bp likely related to agitation    Nicotine patch for tobacco abuse    encourage good oral intake  Pt/ot eval  Discharge planning    Consultations:   IP CONSULT TO HOSPITALIST     Patient is admitted as inpatient status because of co-morbidities listed above, severity of signs and symptoms as outlined, requirement for current medical therapies and most importantly because of direct risk to patient if care not provided in a hospital setting. Expected length of stay > 48 hours.     Vito Moreno MD  4/8/2022  1:36 PM    Copy sent to Dr. Richard Koyanagi, DO

## 2022-04-08 NOTE — PROGRESS NOTES
Physical Therapy    Facility/Department: [facility]  Initial Assessment    NAME: Piyush Colunga  : 1940  MRN: [id number]    Date of Service: 2022    Discharge Recommendations:  Patient would benefit from continued therapy after discharge        Assessment   Body structures, Functions, Activity limitations: Decreased functional mobility ; Decreased endurance;Decreased strength;Decreased balance;Decreased posture  Assessment: Pt limited by visual deficits and poor endurance  Prognosis: Good  Decision Making: High Complexity  PT Education: PT Role;Plan of Care;General Safety; Low Vision Education  Patient Education: Handout for family to read to pt.: fall prevention  REQUIRES PT FOLLOW UP: Yes  Activity Tolerance  Activity Tolerance: Patient limited by endurance       Patient Diagnosis(es): The primary encounter diagnosis was Altered mental status, unspecified altered mental status type. A diagnosis of Acute cystitis with hematuria was also pertinent to this visit. has a past medical history of Hip fracture ([facility] [address] has a past surgical history that includes eye surgery.     Restrictions  Restrictions/Precautions  Restrictions/Precautions: General Precautions,Fall Risk (Legally blind)  Required Braces or Orthoses?: No  Position Activity Restriction  Other position/activity restrictions: Legally blind (can see light), Foely cath, easily agitated, up with assist, LUE IV, ALARMS, easily agitiated  Vision/Hearing        Subjective  General  Chart Reviewed: Yes  Patient assessed for rehabilitation services?: Yes  Response To Previous Treatment: Not applicable  Family / Caregiver Present: No  Follows Commands: Within Functional Limits  General Comment  Comments: OK for PT per Renny Acosta RN  Pain Screening  Patient Currently in Pain: Yes  Pain Assessment  Pain Assessment: 0-10  Pain Level: 8  Pain Type: Acute pain  Pain Location: Groin  Vital Signs  Patient Currently in Pain: Yes       Orientation  Orientation  Overall Orientation Status: Within Normal Limits  Social/Functional History  Social/Functional History  Lives With: Daughter (and son in law; (mentioned that six people live in apartment unable to specify))  Type of Home: Apartment  Home Layout: Two level  Home Access: Stairs to enter with rails  Entrance Stairs - Number of Steps: 7-8  Entrance Stairs - Rails: Right  Bathroom Shower/Tub: Tub/Shower unit  Bathroom Equipment: Shower chair (sink will be close by for support)  Home Equipment: 4 wheeled walker,Cane  ADL Assistance: Needs assistance (gets assistance with showering)  Homemaking Assistance: Needs assistance (family does house work)  Ambulation Assistance: Independent (does not use AD in house, RW in community)  Transfer Assistance: Independent  Active : No  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  Pt reports \"oh yeah I've fallen lately, here and there, I can't lie about that it happens\" unable to give specifics  Cognition   Cognition  Overall Cognitive Status: WNL    Objective     Observation/Palpation  Posture: Fair (Trunk flexed posture)    AROM RLE (degrees)  RLE AROM: WNL  AROM LLE (degrees)  LLE AROM : WNL  AROM RUE (degrees)  RUE General AROM: See OT eval for B UE ROM  Strength RLE  Strength RLE: WFL  Comment: 4/5 to 4+/5 B LE's  Strength LLE  Strength LLE: WFL  Strength RUE  Comment: See OT eval for B UE MMT  Tone RLE  RLE Tone: Normotonic  Tone LLE  LLE Tone: Normotonic  Motor Control  Gross Motor?: WNL  Sensation  Overall Sensation Status: WNL  Bed mobility  Rolling to Left: Modified independent  Sit to Supine: Stand by assistance  Scooting: Stand by assistance  Comment: Pt EOB w/ nursing when PT arrived  Transfers  Sit to Stand: Minimal Assistance;2 Person Assistance  Stand to sit: Minimal Assistance;2 Person Assistance  Stand Pivot Transfers: Minimal Assistance;2 Person Assistance  Ambulation  Ambulation?: Yes  Ambulation 1  Surface: level tile  Device: Rolling Walker  Assistance: Minimal assistance;2 Person assistance  Gait Deviations: Slow Tori;Decreased step length  Distance: 15' x 1  Comments: BTB, limited by poor vision  Stairs/Curb  Stairs?: No     Balance  Posture: Fair  Sitting - Static: Good  Sitting - Dynamic: Good  Standing - Static: Good;-  Standing - Dynamic: Fair        Plan   Plan  Times per week: 1-2x/day,5-6 days/week  Current Treatment Recommendations: Cristina Pott Training,Gait Training,Stair training (Posture)  Safety Devices  Type of devices: Gait belt,Left in bed,Call light within reach,Bed alarm in place  Restraints  Initially in place: No    G-Code       OutComes Score  Balance Score: 6 (04/08/22 1453)  Gait Score: 7 (04/08/22 1453)        Tinetti Total Score: 13 (04/08/22 1453)                                   AM-PAC Score  AM-PAC Inpatient Mobility Raw Score : 18 (04/08/22 1453)  AM-PAC Inpatient T-Scale Score : 43.63 (04/08/22 1453)  Mobility Inpatient CMS 0-100% Score: 46.58 (04/08/22 1453)  Mobility Inpatient CMS G-Code Modifier : CK (04/08/22 1453)          Goals  Short term goals  Time Frame for Short term goals: 12 treatments  Short term goal 1: Independent transfers  Short term goal 2: SBA ambulation w/ ' x 1/ CGA ascending/descending 7 steps w/ B HR  Short term goal 3: Good standing balance and posture  Short term goal 4: Tolerate 30 min ther act  Short term goal 5: 5/5 B LE strength  Patient Goals   Patient goals : Home to my own apt.        Therapy Time   Individual Concurrent Group Co-treatment   Time In 2578 (Treatment time 24 minutes)         Time Out 56         Minutes 29           Co-treatment with OT warranted secondary to decreased safety and independence requiring 2 skilled therapy professionals to address individual discipline's goals.          Jose Ramon Saeed, PT

## 2022-04-08 NOTE — CARE COORDINATION
DC Planning     Pt is confused. Left message with pt dtr Wendie Alaniz for assessment. Did find in [employer] certification. Spoke with Lavella Goodell from [facility] confirmed pt is current with them for SN ([facility]run[facility]) and HHA. Informed if family wants return home ay dc he will need PT/OT (he was getting thru [facility] back in February).

## 2022-04-08 NOTE — ED PROVIDER NOTES
[facility]  Emergency Department Encounter     Pt Name: Rigo Alvarado  MRN: [id number]  [place] 1940  Date of evaluation: 4/8/22  PCP:  Jethro Herrera DO    CHIEF COMPLAINT       Chief Complaint   Patient presents with    Altered Mental Status    Urinary Tract Infection       HISTORY OFPRESENT ILLNESS  (Location/Symptom, Timing/Onset, Context/Setting, Quality, Duration, Modifying Factors,Severity.)      Rigo Alvarado is a [de-identified] y.o. male who presents with altered mental status, indwelling Worley catheter due to urinary retention following with  [address] [address]. Has had multiple attempts at Worley removal however continues to have urinary retention. Reported BPH. Family reporting that patient has become increasingly confused and that they are supposed to have the Worley changed in a few days. PAST MEDICAL / SURGICAL / SOCIAL / FAMILY HISTORY      has a past medical history of Hip fracture ([facility] [address] has a past surgical history that includes eye surgery. Social History     Socioeconomic History    Marital status:       Spouse name: Not on file    Number of children: Not on file    Years of education: Not on file    Highest education level: Not on file   Occupational History    Not on file   Tobacco Use    Smoking status: Current Every Day Smoker     Packs/day: 1.00     Years: 60.00     Pack years: 60.00     Types: Cigarettes    Smokeless tobacco: Never Used   Substance and Sexual Activity    Alcohol use: No    Drug use: Yes     Types: Marijuana Elfrieda Piña)    Sexual activity: Not on file   Other Topics Concern    Not on file   Social History Narrative    Not on file     Social Determinants of Health     Financial Resource Strain: Low Risk     Difficulty of Paying Living Expenses: Not hard at all   Food Insecurity: No Food Insecurity    Worried About Running Out of Food in the Last Year: Never true    Bright of Food in the Last Year: Never true Transportation Needs:     Lack of Transportation (Medical): Not on file    Lack of Transportation (Non-Medical): Not on file   Physical Activity:     Days of Exercise per Week: Not on file    Minutes of Exercise per Session: Not on file   Stress:     Feeling of Stress : Not on file   Social Connections:     Frequency of Communication with Friends and Family: Not on file    Frequency of Social Gatherings with Friends and Family: Not on file    Attends Mandaeism Services: Not on file    Active Member of [address] [place] [address] [employer] or Organizations: Not on file    Attends Club or Organization Meetings: Not on file    Marital Status: Not on file   Intimate Partner Violence:     Fear of Current or Ex-Partner: Not on file    Emotionally Abused: Not on file    Physically Abused: Not on file    Sexually Abused: Not on file   Housing Stability:     Unable to Pay for Housing in the Last Year: Not on file    Number of [place] in the Last Year: Not on file    Unstable Housing in the Last Year: Not on file       Family History   Problem Relation Age of Onset    High Blood Pressure Mother     Heart Disease Mother     Kidney Disease Mother     Other Sister         obesity       Allergies:  Patient has no known allergies. Home Medications:  Prior to Admission medications    Medication Sig Start Date End Date Taking?  Authorizing Provider   calcium citrate (CALCITRATE) 950 (200 Ca) MG tablet Take 400 mg by mouth 3 times daily (with meals) 12/23/21   Historical Provider, MD   acetaminophen (TYLENOL) 500 MG tablet Take 1,000 mg by mouth every 6 hours as needed 11/30/21   Historical Provider, MD   Cholecalciferol 50 MCG (2000 UT) TABS Take 1 tablet by mouth daily 1/18/22   Kay Dienes, DO   sertraline (ZOLOFT) 25 MG tablet Take 1 tablet by mouth daily 1/18/22   Kay Dienes, DO   metFORMIN (GLUCOPHAGE) 850 MG tablet Take 1 tablet by mouth 2 times daily (with meals) 12/27/21   Kay Dienes, DO tamsulosin (FLOMAX) 0.4 MG capsule Take 2 capsules by mouth daily 8/27/21   Swathi Nair DO   latanoprost (XALATAN) 0.005 % ophthalmic solution Administer 1 drop to the right eye nightly. 10/27/16   Historical Provider, MD       REVIEW OF SYSTEMS    (2-9 systems for level 4, 10 or more for level 5)      Review of Systems   Unable to perform ROS: Mental status change       PHYSICAL EXAM   (up to 7 for level 4, 8 or more for level 5)     INITIAL VITALS:    height is 5' 11\" (1.803 m) and weight is 127 lb (57.6 kg). His oral temperature is 100.2 °F (37.9 °C). His blood pressure is 115/62 and his pulse is 91. His respiration is 20 and oxygen saturation is 93%. Physical Exam  Vitals and nursing note reviewed. Constitutional:       General: He is not in acute distress. Appearance: He is well-developed. He is not toxic-appearing. HENT:      Head: Normocephalic and atraumatic. Nose: Nose normal.      Mouth/Throat:      Mouth: Mucous membranes are moist.   Eyes:      General: No scleral icterus. Conjunctiva/sclera: Conjunctivae normal.      Pupils: Pupils are equal, round, and reactive to light. Neck:      Trachea: No tracheal deviation. Cardiovascular:      Rate and Rhythm: Normal rate and regular rhythm. Heart sounds: Normal heart sounds. No murmur heard. No friction rub. No gallop. Pulmonary:      Effort: Pulmonary effort is normal. No respiratory distress. Breath sounds: Normal breath sounds. No wheezing or rales. Abdominal:      General: There is no distension. Palpations: Abdomen is soft. There is no mass. Tenderness: There is abdominal tenderness. There is no guarding. Hernia: No hernia is present. Comments: Mild suprapubic tenderness to palpation, indwelling Worley with sediment and cloudy urine in tube   Musculoskeletal:         General: Normal range of motion. Cervical back: Neck supple. Skin:     General: Skin is warm and dry. Findings: No erythema or rash. Neurological:      Mental Status: He is alert. Comments: Confused as to location, moving all extremities equally, answering some questions appropriately others with bizarre answers   Psychiatric:         Behavior: Behavior normal.         DIFFERENTIAL  DIAGNOSIS     PLAN (LABS / [employer] / EKG):  Orders Placed This Encounter   Procedures    Culture, Blood 1    Culture, Blood 1    Culture, Urine    CT HEAD WO CONTRAST    CBC with Auto Differential    Comprehensive Metabolic Panel    APTT    Protime-INR    Procalcitonin    Urinalysis with Microscopic    Lactic Acid    Insert indwelling urinary catheter    Discontinue indwelling urinary catheter when documented indications no longer apply per hospital policy    Inpatient consult to Hospitalist       MEDICATIONS ORDERED:  Orders Placed This Encounter   Medications    0.9 % sodium chloride bolus    lidocaine (XYLOCAINE) 2 % uro-jet 5 mL    cefepime (MAXIPIME) 2000 mg IVPB minibag     Order Specific Question:   Antimicrobial Indications     Answer:   Urinary Tract Infection       DDX: UTI versus pyelonephritis versus septic kidney stone versus intracranial hemorrhage    Initial MDM/Plan: [de-identified] y.o. male who presents with confusion and altered mental status with indwelling Worley. Does have low-grade temperature. Suspect UTI. Change Worley. Septic work-up. Given altered mental status admission.     DIAGNOSTIC RESULTS / EMERGENCY DEPARTMENT COURSE / MDM     LABS:  Labs Reviewed   CBC WITH AUTO DIFFERENTIAL - Abnormal; Notable for the following components:       Result Value    RDW 14.5 (*)     All other components within normal limits   COMPREHENSIVE METABOLIC PANEL - Abnormal; Notable for the following components:    Glucose 190 (*)     BUN 34 (*)     Bun/Cre Ratio 33 (*)     All other components within normal limits   APTT - Abnormal; Notable for the following components:    PTT 35.0 (*)     All other components within normal limits   URINALYSIS WITH MICROSCOPIC - Abnormal; Notable for the following components:    Turbidity UA SLIGHTLY CLOUDY (*)     Urine Hgb 3+ (*)     Protein, UA 1+ (*)     Leukocyte Esterase, Urine SMALL (*)     Bacteria, UA MODERATE (*)     Amorphous, UA 1+ (*)     All other components within normal limits   CULTURE, BLOOD 1   CULTURE, BLOOD 1   CULTURE, URINE   PROTIME-INR   LACTIC ACID   PROCALCITONIN         RADIOLOGY:  CT HEAD WO CONTRAST    Result Date: 4/8/2022  EXAMINATION: CT OF THE HEAD WITHOUT CONTRAST  4/8/2022 3:16 am TECHNIQUE: CT of the head was performed without the administration of intravenous contrast. Dose modulation, iterative reconstruction, and/or weight based adjustment of the mA/kV was utilized to reduce the radiation dose to as low as reasonably achievable. COMPARISON: 01/12/2022. HISTORY: ORDERING SYSTEM PROVIDED HISTORY: AMS TECHNOLOGIST PROVIDED HISTORY: AMS Decision Support Exception - unselect if not a suspected or confirmed emergency medical condition->Emergency Medical Condition (MA) Reason for Exam: AMS, unable to get pt history Initial evaluation. FINDINGS: BRAIN/VENTRICLES: There is no acute intracranial hemorrhage, mass effect or midline shift. No abnormal extra-axial fluid collection. The gray-white differentiation is maintained without evidence of an acute infarct. There is no evidence of hydrocephalus. There are areas of hypoattenuation in the periventricular and subcortical white matter, which is nonspecific, but may represent chronic microvasvular ischemic change. There is prominence of the ventricles and sulci due to global parenchymal volume loss. Scattered atherosclerosis of the intracranial vasculature. ORBITS: The visualized portion of the orbits demonstrate no acute abnormality. SINUSES: There is scattered mucosal thickening of the bilateral ethmoid and right maxillary sinuses. The mastoid air cells demonstrate no acute abnormality.  SOFT TISSUES/SKULL:  No acute abnormality of the visualized skull or soft tissues. 1. No acute intracranial abnormality. 2. Mild-to-moderate global parenchymal volume loss with chronic microvascular ischemic changes. 3. Scattered atherosclerosis of the intracranial vasculature. EMERGENCY DEPARTMENT COURSE:  ED Course as of 04/08/22 0507   Fri Apr 08, 2022   0502 WBC, UA: 20 TO 50 [MS]   0502 RBC, UA: TOO NUMEROUS TO COUNT [MS]      ED Course User Index  [MS] Nam Pak DO     Urinalysis concerning for infection. Blood as well as pyuria, bacteriuria. Will cover with cefepime given indwelling Worley reported urine infections previously. Normal lactic. Otherwise quite normal labs especially given age. Will discuss with admitting service for admission. PROCEDURES:  None    CONSULTS:  IP CONSULT TO HOSPITALIST    CRITICAL CARE:  0    FINAL IMPRESSION      1. Altered mental status, unspecified altered mental status type    2. Acute cystitis with hematuria          DISPOSITION / PLAN     DISPOSITION Decision To Admit 04/08/2022 05:06:36 AM        PATIENTREFERRED TO:  No follow-up provider specified.     DISCHARGE MEDICATIONS:  New Prescriptions    No medications on file       Nam Pak DO  EmergencyMedicine Attending    (Please note that portions of this note were completed with a voice recognition program.  Efforts were made to edit the dictations but occasionally words are mis-transcribed.)       Nam Pak DO  04/08/22 8921

## 2022-04-08 NOTE — ED NOTES
Pt presents to the ER for altered mental status, weakness. Pt family member states he has been in this state since yesterday. Pt is blind. Pt has very hard time standing due to weakness. Pt has a modi in place that is malodorous and sediment is noted. Pt family worried about pt having UTI. Pt has had modi in place and usually gets it changed monthly. Last time it was changed was 3/17/2022 by his home health nurses. Pt urologist is Juice. Pt able to answer questions appropriately.       Brandi Gonzalez RN  04/08/22 0407       Brandi Gonzalez RN  04/08/22 0408

## 2022-04-08 NOTE — PROGRESS NOTES
Angelo Murillo was ordered Calcium citrate. As per the [address] [place] [address], herbals and certain dietary supplements will be discontinued. The herbal or dietary supplement may be continued after discharge from the hospital.     If you feel the patient needs to continue their home herbal therapy during the inpatient stay, the patient may bring an unopened bottle for verification and administration pursuant to our home medication use policy. Please contact the pharmacy with any questions or concerns. Thank you.   Zully Vasques, [facility]   4/8/2022  6:33 AM

## 2022-04-08 NOTE — DISCHARGE INSTR - COC
Continuity of Care Form    Patient Name: Fanny Smart   :  1940  MRN:  [id number]    Admit date:  2022  Discharge date:      Code Status Order: Full Code   Advance Directives:      Admitting Physician:  Mora Perry MD  PCP: Alex Agudelo DO    Discharging Nurse: [facility] Unit/Room#:   Discharging Unit Phone Number: ***    Emergency Contact:   Extended Emergency Contact Information  Primary Emergency Contact: Brandi Flanagan  Address: [address] [place] [address] Phone: [phone]  Relation: Child  Secondary Emergency Contact: Anne Tilley  [place] Phone: [phone]  Relation: Child    Past Surgical History:  Past Surgical History:   Procedure Laterality Date    EYE SURGERY         Immunization History:   Immunization History   Administered Date(s) Administered    COVID-19, Pfizer Purple top, DILUTE for use, 12+ yrs, 30mcg/0.3mL dose 2021, 2021    Influenza, Quadv, IM, (6 mo and older Fluzone, Flulaval, Fluarix and 3 yrs and older Afluria) 02/15/2018, 2019, 10/23/2020       Active Problems:  Patient Active Problem List   Diagnosis Code    Type 2 diabetes mellitus, without long-term current use of insulin ([address] E11.9    Essential hypertension I10    Advanced open-angle glaucoma H40.10X0    Benign non-nodular prostatic hyperplasia with lower urinary tract symptoms N40.1    Dry eyes, bilateral H04.123    Elevated PSA R97.20    Blindness of both eyes H54.3    Closed right hip fracture ([address] S72.001A    UTI (urinary tract infection) N39.0    Moderate protein-calorie malnutrition ([address] J33.2    Toxic metabolic encephalopathy V09.1    Tobacco abuse Z72.0       Isolation/Infection:   Isolation            No Isolation          Patient Infection Status       None to display            Nurse Assessment:  Last Vital Signs: BP (!) 123/56   Pulse 81   Temp 97.9 °F (36.6 °C) (Oral)   Resp 20   Ht 5' 11\" (1.803 m)   Wt 139 lb 12.8 oz (63.4 kg)   SpO2 (!) 88%   BMI 19.50 kg/m²     Last documented pain score (0-10 scale): Pain Level: 8  Last Weight:   Wt Readings from Last 1 Encounters:   04/08/22 139 lb 12.8 oz (63.4 kg)     Mental Status:  oriented    IV Access:  - None    Nursing Mobility/ADLs:  Walking   Assisted  Transfer  Assisted  Bathing  Assisted  Dressing  Assisted  Toileting  Assisted  Feeding  Assisted  Med Admin  Independent  Med Delivery   whole    Wound Care Documentation and Therapy:        Elimination:  Continence: Bowel: Yes  Bladder: ***  Urinary Catheter: Indication for Use of Catheter: Urology/Urologist seeing this patient or inserted indwelling catheter   Colostomy/Ileostomy/Ileal Conduit: No       Date of Last BM: 4/16/22  No intake or output data in the 24 hours ending 04/08/22 1626  No intake/output data recorded. Safety Concerns: At Risk for Falls    Impairments/Disabilities:      Vision    Nutrition Therapy:  Current Nutrition Therapy:   - honey thick    Routes of Feeding: Oral  Liquids: Honey Thick Liquids  Daily Fluid Restriction: no  Last Modified Barium Swallow with Video (Video Swallowing Test): {Done Not Done ILO:[id number]}    Treatments at the Time of Hospital Discharge:   Respiratory Treatments: ***  Oxygen Therapy:  is on oxygen at 3 L/min per nasal cannula.   Ventilator:    - No ventilator support    Rehab Therapies: Physical Therapy and Occupational Therapy  Weight Bearing Status/Restrictions: No weight bearing restrictions  Other Medical Equipment (for information only, NOT a DME order):  walker  Other Treatments:   SN  Worley care    Patient's personal belongings (please select all that are sent with patient):  Anne    RN SIGNATURE:  Electronically signed by Rosita Sun RN on 4/16/22 at 11:11 AM EDT    CASE MANAGEMENT/SOCIAL WORK SECTION    Inpatient Status Date: 4/8/22    Readmission Risk Assessment Score:  Readmission Risk              Risk of Unplanned Readmission: 10           Discharging to Facility/ Agency    Name: [address] [facility]   Address: [address] [facility] [place] [address], [place]  Phone:  [phone]  Fax: [phone]         Name: [address] [facility]   Address: [place]   [address] [facility] [address]  Phone [phone]    / signature: Electronically signed by Kusum Hernandez RN on 4/8/22 at 4:27 PM EDT    PHYSICIAN SECTION    Prognosis: Fair    Condition at Discharge: Stable    Rehab Potential (if transferring to Rehab): Fair    Recommended Labs or Other Treatments After Discharge: duoneb as needed  Start Symbicort   Prednisone 40 mg 5 days    Physician Certification: I certify the above information and transfer of Horacio Palafox  is necessary for the continuing treatment of the diagnosis listed and that he requires [facility] for less 30 days.      Update Admission H&P: Changes in H&P as follows - discharge summary to follow    PHYSICIAN SIGNATURE:  Electronically signed by Orly Sierra MD on 4/14/22 at 2:12 PM EDT

## 2022-04-08 NOTE — PROGRESS NOTES
Transitions of Care Pharmacy Service   Medication Review    The patient's list of current home medications has been reviewed. His PCP office prescribes in [address] Source(s) of information: Surescripts refill report, [facility], Care Everywhere      PROVIDER ACTION REQUESTED  Medications that need to be addressed by a physician/nurse practitioner:    Medication Action Requested   Flomax 0.8mg   Home dose is 0.4mg daily instead -- please adjust as appropriate         Please feel free to call me with any questions about this encounter. Thank you. Tyler Parker [facility]   Transitions of Care Pharmacy Service  Phone:  [phone]  Fax: [phone]      Electronically signed by Tyler Parker, [address] [place] [address] on 4/8/2022 at 6:00 PM           Medications Prior to Admission:   finasteride (PROSCAR) 5 MG tablet, Take 5 mg by mouth daily  tamsulosin (FLOMAX) 0.4 MG capsule, Take 0.4 mg by mouth at bedtime  calcium citrate (CALCITRATE) 950 (200 Ca) MG tablet, Take 400 mg by mouth 2 times daily   acetaminophen (TYLENOL) 500 MG tablet, Take 1,000 mg by mouth every 6 hours as needed  Cholecalciferol 50 MCG (2000 UT) TABS, Take 1 tablet by mouth daily  sertraline (ZOLOFT) 25 MG tablet, Take 1 tablet by mouth daily  metFORMIN (GLUCOPHAGE) 850 MG tablet, Take 1 tablet by mouth 2 times daily (with meals)  latanoprost (XALATAN) 0.005 % ophthalmic solution, Administer 1 drop to the right eye nightly.

## 2022-04-09 LAB
ABSOLUTE EOS #: <0.03 K/UL (ref 0–0.44)
ABSOLUTE IMMATURE GRANULOCYTE: 0.05 K/UL (ref 0–0.3)
ABSOLUTE LYMPH #: 0.79 K/UL (ref 1.1–3.7)
ABSOLUTE MONO #: 0.72 K/UL (ref 0.1–1.2)
ANION GAP SERPL CALCULATED.3IONS-SCNC: 11 MMOL/L (ref 9–17)
BASOPHILS # BLD: 0 % (ref 0–2)
BASOPHILS ABSOLUTE: <0.03 K/UL (ref 0–0.2)
BUN BLDV-MCNC: 16 MG/DL (ref 8–23)
BUN/CREAT BLD: 25 (ref 9–20)
CALCIUM SERPL-MCNC: 7.6 MG/DL (ref 8.6–10.4)
CHLORIDE BLD-SCNC: 103 MMOL/L (ref 98–107)
CO2: 22 MMOL/L (ref 20–31)
CREAT SERPL-MCNC: 0.65 MG/DL (ref 0.7–1.2)
EOSINOPHILS RELATIVE PERCENT: 0 % (ref 1–4)
GFR AFRICAN AMERICAN: >60 ML/MIN
GFR NON-AFRICAN AMERICAN: >60 ML/MIN
GFR SERPL CREATININE-BSD FRML MDRD: ABNORMAL ML/MIN/{1.73_M2}
GLUCOSE BLD-MCNC: 110 MG/DL (ref 70–99)
GLUCOSE BLD-MCNC: 194 MG/DL (ref 75–110)
HCT VFR BLD CALC: 39.3 % (ref 40.7–50.3)
HEMOGLOBIN: 12.6 G/DL (ref 13–17)
IMMATURE GRANULOCYTES: 1 %
LYMPHOCYTES # BLD: 9 % (ref 24–43)
MAGNESIUM: 1.7 MG/DL (ref 1.6–2.6)
MCH RBC QN AUTO: 30.4 PG (ref 25.2–33.5)
MCHC RBC AUTO-ENTMCNC: 32.1 G/DL (ref 28.4–34.8)
MCV RBC AUTO: 94.7 FL (ref 82.6–102.9)
MONOCYTES # BLD: 8 % (ref 3–12)
NRBC AUTOMATED: 0 PER 100 WBC
PDW BLD-RTO: 14.5 % (ref 11.8–14.4)
PLATELET # BLD: 178 K/UL (ref 138–453)
PMV BLD AUTO: 9.9 FL (ref 8.1–13.5)
POTASSIUM SERPL-SCNC: 3.3 MMOL/L (ref 3.7–5.3)
RBC # BLD: 4.15 M/UL (ref 4.21–5.77)
RBC # BLD: ABNORMAL 10*6/UL
SEG NEUTROPHILS: 82 % (ref 36–65)
SEGMENTED NEUTROPHILS ABSOLUTE COUNT: 7.12 K/UL (ref 1.5–8.1)
SODIUM BLD-SCNC: 136 MMOL/L (ref 135–144)
WBC # BLD: 8.7 K/UL (ref 3.5–11.3)

## 2022-04-09 PROCEDURE — 6360000002 HC RX W HCPCS: Performed by: NURSE PRACTITIONER

## 2022-04-09 PROCEDURE — 2580000003 HC RX 258: Performed by: NURSE PRACTITIONER

## 2022-04-09 PROCEDURE — 6370000000 HC RX 637 (ALT 250 FOR IP): Performed by: NURSE PRACTITIONER

## 2022-04-09 PROCEDURE — 6370000000 HC RX 637 (ALT 250 FOR IP): Performed by: STUDENT IN AN ORGANIZED HEALTH CARE EDUCATION/TRAINING PROGRAM

## 2022-04-09 PROCEDURE — 85025 COMPLETE CBC W/AUTO DIFF WBC: CPT

## 2022-04-09 PROCEDURE — 1200000000 HC SEMI PRIVATE

## 2022-04-09 PROCEDURE — 99232 SBSQ HOSP IP/OBS MODERATE 35: CPT | Performed by: STUDENT IN AN ORGANIZED HEALTH CARE EDUCATION/TRAINING PROGRAM

## 2022-04-09 PROCEDURE — 97530 THERAPEUTIC ACTIVITIES: CPT

## 2022-04-09 PROCEDURE — 83735 ASSAY OF MAGNESIUM: CPT

## 2022-04-09 PROCEDURE — 36415 COLL VENOUS BLD VENIPUNCTURE: CPT

## 2022-04-09 PROCEDURE — 80048 BASIC METABOLIC PNL TOTAL CA: CPT

## 2022-04-09 PROCEDURE — 2580000003 HC RX 258: Performed by: STUDENT IN AN ORGANIZED HEALTH CARE EDUCATION/TRAINING PROGRAM

## 2022-04-09 PROCEDURE — 99221 1ST HOSP IP/OBS SF/LOW 40: CPT | Performed by: PSYCHIATRY & NEUROLOGY

## 2022-04-09 PROCEDURE — 6360000002 HC RX W HCPCS: Performed by: STUDENT IN AN ORGANIZED HEALTH CARE EDUCATION/TRAINING PROGRAM

## 2022-04-09 PROCEDURE — 82947 ASSAY GLUCOSE BLOOD QUANT: CPT

## 2022-04-09 PROCEDURE — 6360000002 HC RX W HCPCS: Performed by: PSYCHIATRY & NEUROLOGY

## 2022-04-09 PROCEDURE — 97112 NEUROMUSCULAR REEDUCATION: CPT

## 2022-04-09 RX ORDER — LORAZEPAM 2 MG/ML
1 INJECTION INTRAMUSCULAR ONCE
Status: COMPLETED | OUTPATIENT
Start: 2022-04-09 | End: 2022-04-09

## 2022-04-09 RX ORDER — LORAZEPAM 2 MG/ML
0.25 INJECTION INTRAMUSCULAR ONCE
Status: COMPLETED | OUTPATIENT
Start: 2022-04-09 | End: 2022-04-09

## 2022-04-09 RX ORDER — POTASSIUM CHLORIDE 7.45 MG/ML
10 INJECTION INTRAVENOUS PRN
Status: DISCONTINUED | OUTPATIENT
Start: 2022-04-09 | End: 2022-04-16 | Stop reason: HOSPADM

## 2022-04-09 RX ORDER — LORAZEPAM 2 MG/ML
1 INJECTION INTRAMUSCULAR ONCE
Status: DISCONTINUED | OUTPATIENT
Start: 2022-04-09 | End: 2022-04-09

## 2022-04-09 RX ORDER — LORAZEPAM 2 MG/ML
1 INJECTION INTRAMUSCULAR
Status: DISPENSED | OUTPATIENT
Start: 2022-04-09 | End: 2022-04-09

## 2022-04-09 RX ORDER — POTASSIUM CHLORIDE 20 MEQ/1
40 TABLET, EXTENDED RELEASE ORAL PRN
Status: DISCONTINUED | OUTPATIENT
Start: 2022-04-09 | End: 2022-04-16 | Stop reason: HOSPADM

## 2022-04-09 RX ORDER — QUETIAPINE FUMARATE 25 MG/1
25 TABLET, FILM COATED ORAL 2 TIMES DAILY
Status: DISCONTINUED | OUTPATIENT
Start: 2022-04-09 | End: 2022-04-10

## 2022-04-09 RX ADMIN — CEFEPIME HYDROCHLORIDE 2000 MG: 2 INJECTION, POWDER, FOR SOLUTION INTRAVENOUS at 04:48

## 2022-04-09 RX ADMIN — LORAZEPAM 1 MG: 2 INJECTION INTRAMUSCULAR; INTRAVENOUS at 14:20

## 2022-04-09 RX ADMIN — LATANOPROST 1 DROP: 50 SOLUTION OPHTHALMIC at 21:34

## 2022-04-09 RX ADMIN — TAMSULOSIN HYDROCHLORIDE 0.8 MG: 0.4 CAPSULE ORAL at 08:56

## 2022-04-09 RX ADMIN — ENOXAPARIN SODIUM 40 MG: 100 INJECTION SUBCUTANEOUS at 08:56

## 2022-04-09 RX ADMIN — LORAZEPAM 0.25 MG: 2 INJECTION INTRAMUSCULAR at 14:15

## 2022-04-09 RX ADMIN — CEFTRIAXONE SODIUM 1000 MG: 1 INJECTION, POWDER, FOR SOLUTION INTRAMUSCULAR; INTRAVENOUS at 16:47

## 2022-04-09 RX ADMIN — Medication 2000 UNITS: at 08:56

## 2022-04-09 RX ADMIN — SODIUM CHLORIDE: 9 INJECTION, SOLUTION INTRAVENOUS at 17:54

## 2022-04-09 RX ADMIN — QUETIAPINE FUMARATE 25 MG: 25 TABLET ORAL at 21:34

## 2022-04-09 RX ADMIN — FINASTERIDE 5 MG: 5 TABLET, FILM COATED ORAL at 08:56

## 2022-04-09 RX ADMIN — SODIUM CHLORIDE: 9 INJECTION, SOLUTION INTRAVENOUS at 03:42

## 2022-04-09 RX ADMIN — POTASSIUM CHLORIDE 40 MEQ: 20 TABLET, EXTENDED RELEASE ORAL at 11:41

## 2022-04-09 RX ADMIN — LORAZEPAM 0.25 MG: 2 INJECTION INTRAMUSCULAR; INTRAVENOUS at 03:36

## 2022-04-09 RX ADMIN — LORAZEPAM 1 MG: 2 INJECTION INTRAMUSCULAR; INTRAVENOUS at 14:30

## 2022-04-09 RX ADMIN — SERTRALINE 25 MG: 25 TABLET, FILM COATED ORAL at 08:56

## 2022-04-09 RX ADMIN — METFORMIN HYDROCHLORIDE 850 MG: 850 TABLET ORAL at 08:56

## 2022-04-09 NOTE — PROGRESS NOTES
Patient still having only very small amount of urine in modi bag. Bladder scanned and showed >999 ml of urine in bladder. Mariola Rausch NP informed. Orders received to replace modi and consult urology.

## 2022-04-09 NOTE — PROGRESS NOTES
Patient becoming increasingly agitated and restless, attempting to get out of bed, pulling at modi catheter. Patient also attempting to hit and kick staff. Jessica Griggs NP notified of patient's behavior. One time dose of IV ativan ordered and given.

## 2022-04-09 NOTE — PROGRESS NOTES
Patient continues to rest in bed comfortably, eyes closed. SPO2 on RA 90%, placed on 2L O2 per NC, SPO2 97%.

## 2022-04-09 NOTE — CONSULTS
[facility]   IN-PATIENT SERVICE      NEUROLOGY CONSULT  NOTE            Date:   4/9/2022  Patient name:  Angelo Murillo  Date of admission:  4/8/2022  YOB: 1940      Chief Complaint:     Chief Complaint   Patient presents with    Altered Mental Status    Urinary Tract Infection       Reason for Consult:      Acute encephalopathy    History of Present Illness: The patient is a [de-identified] y.o. male who presents with Altered Mental Status and Urinary Tract Infection  . The patient was seen and examined and the chart was reviewed. [de-identified]year old man with chronic modi catheter that is exchanged every 30 days, pt was admitted 2 days ago for acute mentation change and found to have UTI, pt was started on cefepime. Today, at the time I saw pt, pt is trying to climb out of bed and stated I want to go back to my bed, he required 3 people to hold him down. At that, he was already given 0.25mg ativan, I gave him another 2 doses of 1mg ativan, after about 15 mins, he calmed down and allow us to put him back to bed. Past Medical History:     Past Medical History:   Diagnosis Date    Hip fracture [facility])         Past Surgical History:     Past Surgical History:   Procedure Laterality Date    EYE SURGERY          Medications Prior to Admission:     Prior to Admission medications    Medication Sig Start Date End Date Taking?  Authorizing Provider   finasteride (PROSCAR) 5 MG tablet Take 5 mg by mouth daily   Yes Historical Provider, MD   tamsulosin (FLOMAX) 0.4 MG capsule Take 0.4 mg by mouth at bedtime   Yes Historical Provider, MD   calcium citrate (CALCITRATE) 950 (200 Ca) MG tablet Take 400 mg by mouth 2 times daily  12/23/21   Historical Provider, MD   acetaminophen (TYLENOL) 500 MG tablet Take 1,000 mg by mouth every 6 hours as needed 11/30/21   Historical Provider, MD   Cholecalciferol 50 MCG (2000 UT) TABS Take 1 tablet by mouth daily 1/18/22   Cinthia Boles DO   sertraline (ZOLOFT) 25 MG tablet Take 1 tablet by mouth daily 22   Ana Clark DO   metFORMIN (GLUCOPHAGE) 850 MG tablet Take 1 tablet by mouth 2 times daily (with meals) 21   Ana Clark DO   latanoprost (XALATAN) 0.005 % ophthalmic solution Administer 1 drop to the right eye nightly. 10/27/16   Historical Provider, MD        Allergies:     Patient has no known allergies. Social History:     Tobacco:    reports that he has been smoking cigarettes. He has a 60.00 pack-year smoking history. He has never used smokeless tobacco.  Alcohol:      reports no history of alcohol use. Drug Use:  reports current drug use. Drug: Marijuana Lana Dasen). Family History:     Family History   Problem Relation Age of Onset    High Blood Pressure Mother     Heart Disease Mother     Kidney Disease Mother     Other Sister         obesity       Review of Systems:       Constitutional Negative for fever and chills   HEENT Negative for ear discharge, ear pain, nosebleed. Negative for photophobia, headache. Musculoskeletal Negative for joint pain, negative for myalgia   Respiratory Negative for cough, dyspnea. Negative for hemoptysis and sputum. Cardiovascular Negative for palpitations, chest pain. Negative for orthopnea, claudication. Gastrointestinal Negative for nausea, vomiting. Negative for abdominal pain, diarrhea, blood in stool   Genitourinary  Negative for dysuria, hematuria. Negative for suprapubic pain. Negative for bladder incontinence. Skin Negative for rash or itching   Hematology Negative for ecchymosis, anemia   Psychiatric Negative for anxiety, depression.  Negative for suicidal ideation, hallucinations         Physical Exam:   /79   Pulse 85   Temp 98.1 °F (36.7 °C) (Oral)   Resp 18   Ht 5' 11\" (1.803 m)   Wt 131 lb 3.2 oz (59.5 kg)   SpO2 91%   BMI 18.30 kg/m²   Temp (24hrs), Av °F (36.7 °C), Min:97.8 °F (36.6 °C), Max:98.3 °F (36.8 °C)        General examination:      General Appearance:  alert, well appearing, and in no acute distress  HEENT: Normocephalic, atraumatic, moist mucus membranes  Neck: supple, no carotid bruits, (-) nuchal rigidity  Lungs:  Respirations unlabored, chest wall no deformity, BS normal  Cardiovascular: normal rate, regular rhythm  Abdomen: Soft, nontender, nondistended, normal bowel sounds  Skin: No gross lesions, rashes, bruising or bleeding on exposed skin area  Extremities:  peripheral pulses palpable, no cyanosis, clubbing or edema  Psych: normal affect      Neurological examination:    Mental status   Alert and oriented x 2, not oriented to place and not oriented to events; not following most commands; speech is fluent, no dysarthria, aphasia. Cranial nerves   II - visual fields intact to confrontation; pupils reactive  III, IV, VI - extraocular muscles intact; no KEESHA; no nystagmus; no ptosis   V - normal facial sensation                                                               VII - normal facial symmetry                                                             VIII - intact hearing                                                                             IX, X - symmetrical palate elevation                                               XI - symmetrical shoulder shrug                                                       XII - midline tongue without atrophy or fasciculation     Motor function  Strength: 5/5 RUE, 5/5 RLE, 5/5 LUE, 5/5  LLE  Normal bulk and tone. No tremors                      Sensory function Intact to touch, pin, vibration, proprioception throughout     Cerebellar Intact finger-nose-finger testing. Intact heel-shin testing. No dysdiadochokinesia present. Reflex function  Downgoing plantar response bilaterally.  (-)Redman's sign bilaterally    Gait                  Normal station and gait             Diagnostics:      Laboratory Testing:  CBC:   Recent Labs     04/08/22  0310 04/09/22  0553   WBC 9.0 8.7   HGB 13.2 12.6*    178     BMP:    Recent Labs     04/08/22  0310 04/09/22  0553    136   K 4.2 3.3*    103   CO2 22 22   BUN 34* 16   CREATININE 1.02 0.65*   GLUCOSE 190* 110*         Lab Results   Component Value Date    CHOL 193 10/30/2021    LDLCHOLESTEROL 97 10/30/2021    HDL 80 10/30/2021    TRIG 78 10/30/2021    ALT 13 04/08/2022    AST 17 04/08/2022    TSH 1.69 10/30/2021    INR 1.0 04/08/2022    LABA1C 6.5 08/27/2021    LABMICR 48 (H) 10/30/2021       No results found for: PHENYTOIN, PHENYTOIN, VALPROATE, CBMZ      Imaging/Diagnostics:      EEG:       No results found for this or any previous visit. No results found for this or any previous visit. No results found for this or any previous visit. No results found for this or any previous visit. No results found for this or any previous visit. No results found for this or any previous visit. No results found for this or any previous visit. Results for orders placed during the hospital encounter of 04/08/22    CT HEAD WO CONTRAST    Narrative  EXAMINATION:  CT OF THE HEAD WITHOUT CONTRAST  4/8/2022 3:16 am    TECHNIQUE:  CT of the head was performed without the administration of intravenous  contrast. Dose modulation, iterative reconstruction, and/or weight based  adjustment of the mA/kV was utilized to reduce the radiation dose to as low  as reasonably achievable. COMPARISON:  01/12/2022. HISTORY:  ORDERING SYSTEM PROVIDED HISTORY: AMS  TECHNOLOGIST PROVIDED HISTORY:    AMS  Decision Support Exception - unselect if not a suspected or confirmed  emergency medical condition->Emergency Medical Condition (MA)  Reason for Exam: AMS, unable to get pt history    Initial evaluation. FINDINGS:  BRAIN/VENTRICLES: There is no acute intracranial hemorrhage, mass effect or  midline shift. No abnormal extra-axial fluid collection. The gray-white  differentiation is maintained without evidence of an acute infarct.   There is  no evidence of hydrocephalus. There are areas of hypoattenuation in the  periventricular and subcortical white matter, which is nonspecific, but may  represent chronic microvasvular ischemic change. There is prominence of the  ventricles and sulci due to global parenchymal volume loss. Scattered  atherosclerosis of the intracranial vasculature. ORBITS: The visualized portion of the orbits demonstrate no acute abnormality. SINUSES: There is scattered mucosal thickening of the bilateral ethmoid and  right maxillary sinuses. The mastoid air cells demonstrate no acute  abnormality. SOFT TISSUES/SKULL:  No acute abnormality of the visualized skull or soft  tissues. Impression  1. No acute intracranial abnormality. 2. Mild-to-moderate global parenchymal volume loss with chronic microvascular  ischemic changes. 3. Scattered atherosclerosis of the intracranial vasculature. CT head: nothing acute, chronic atrophy         I personally reviewed all of the above medications, clinical laboratory, imaging and other diagnostic tests.          Impression:      [de-identified]year old man with chronic modi cathether, now with UTI and p/w acute encephalopathy    Plan:      His encephalopathy is most likely caused by his UTI, on neuro exam there is no focal deficit   Pt is on cefepime, cefepime is very neurotoxin among all the cephalosporin, I recommend discontinuing it and cefepime can exacerbate his encephalopathy and agitation, ativan will work best to counter act it   Pt calmed down after giving him ativan 2mg IV   Ok use to seroquel 25mg BID PRN as needed during night to also help him sleep, it has been reported pt didn't sleep well   Will follow, no further neurological testing at this time      I spend a total of 60 minutes with greater than 60% of time spent face to face, counseling, coordinating care, examining patient, reviewing images and labs personally, and speaking with team.         Thank you for this very interesting consultation.       Electronically signed by Celia Gilman MD on 4/9/2022 at 1:12 PM      Celia Gilman MD  [facility]  Neurology

## 2022-04-09 NOTE — PROGRESS NOTES
Patient complaining of pain to pelvic/suprapubic area. Bladder notably distended and firm. Bladder scanned and showed >999 ml of urine in bladder. Arash Carmona NP notified. Order received to hand irrigate modi catheter. Catheter irrigated with sterile saline. Catheter advanced. Balloon deflated and reinflated. Patient repositioned in bed. Small amount of yellow urine draining from modi bag. Will continue to monitor.

## 2022-04-09 NOTE — PLAN OF CARE
Problem: Falls - Risk of:  Goal: Will remain free from falls  Description: Will remain free from falls  Outcome: Ongoing  Note: Siderails up x 2  Hourly rounding  Call light in reach  Instructed to call for assist before attempting out of bed. Remains free from falls and accidental injury at this time   Floor free from obstacles  Bed is locked and in lowest position  Adequate lighting provided  Bed alarm on, Red Falling star and Stay with Me signs posted         Problem: Pain:  Goal: Pain level will decrease  Description: Pain level will decrease  Outcome: Ongoing  Note: Pain level assessment complete.    Patient educated on pain scale and control interventions  PRN pain medication given per patient request  Patient instructed to call out with new onset of pain or unrelieved pain       Problem: Urinary Elimination:  Goal: Signs and symptoms of infection will decrease  Description: Signs and symptoms of infection will decrease  Outcome: Ongoing     Problem: Safety:  Goal: Ability to remain free from injury will improve  Description: Ability to remain free from injury will improve  Outcome: Ongoing

## 2022-04-09 NOTE — PROGRESS NOTES
Physical Therapy  Facility/Department: [facility]  Daily Treatment Note  NAME: Rigo Alvarado  : 1940  MRN: [id number]    Date of Service: 2022    Discharge Recommendations:  Patient would benefit from continued therapy after discharge        Assessment   Body structures, Functions, Activity limitations: Decreased functional mobility ; Decreased endurance;Decreased strength;Decreased balance;Decreased posture  Assessment: Pt performed well, however did not participate in ambulation. Prognosis: Good  Decision Making: High Complexity  REQUIRES PT FOLLOW UP: Yes  Activity Tolerance  Activity Tolerance: Patient limited by endurance     Patient Diagnosis(es): The primary encounter diagnosis was Altered mental status, unspecified altered mental status type. A diagnosis of Acute cystitis with hematuria was also pertinent to this visit. has a past medical history of Hip fracture ([facility] [address] has a past surgical history that includes eye surgery. 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 Family / Caregiver Present: No  Pain Screening  Patient Currently in Pain: No  Pain Assessment  Pain Assessment: 0-10  Vital Signs  Patient Currently in Pain: No         Objective   Bed mobility  Scooting: Minimal assistance  Worked at length with bed mobility and positioning. Dangled bedside w/ CGA +1. Transfers  Sit to Stand:  Moderate Assistance  Stand to sit: Moderate Assistance    Balance  Posture: Fair  Sitting - Static: Good  Sitting - Dynamic: Good  Standing - Static: Good;-  Standing - Dynamic: Fair  Other exercises  Other exercises?: No      Goals  Short term goals  Time Frame for Short term goals: 12 treatments  Short term goal 1: Independent transfers  Short term goal 2: SBA ambulation w/ ' x 1/ CGA ascending/descending 7 steps w/ B HR  Short term goal 3: Good standing balance and posture  Short term goal 4: Tolerate 30 min ther act  Short term goal 5: 5/5 B LE strength  Patient Goals   Patient goals : Home to my own apt.     Plan    Plan  Times per week: 1-2x/day,5-6 days/week  Current Treatment Recommendations: Strengthening,Transfer Training,Balance Training,Endurance Training,Gait Training,Stair training  Safety Devices  Type of devices: Gait belt,Left in bed,Call light within reach,Bed alarm in place  Restraints  Initially in place: No     Therapy Time   Individual   Time In  1031   Time Out  [facility] 96

## 2022-04-09 NOTE — PROGRESS NOTES
Patient continues to be agitated and not redirectable or calmed by staff. House supervisor Esau Dunlap called to beside to assist. With talking to supervisor, patient becoming calmer and allowed staff to reposition him into a more comfortable position in bed. One on one continues. Will continue to monitor patient closely.

## 2022-04-09 NOTE — PLAN OF CARE
Problem: Falls - Risk of:  Goal: Will remain free from falls  Description: Will remain free from falls  4/9/2022 0219 by Jael Martell RN  Outcome: Ongoing  4/8/2022 1616 by Domo Ernst RN  Outcome: Ongoing  Goal: Absence of physical injury  Description: Absence of physical injury  4/9/2022 0219 by Jael Martell RN  Outcome: Ongoing  4/8/2022 1616 by Domo Ernst RN  Outcome: Ongoing     Problem: Pain:  Goal: Pain level will decrease  Description: Pain level will decrease  4/9/2022 0219 by Jael Martell RN  Outcome: Ongoing  4/8/2022 1616 by Domo Ernst RN  Outcome: Ongoing  Goal: Control of acute pain  Description: Control of acute pain  4/9/2022 0219 by Jael Martell RN  Outcome: Ongoing  4/8/2022 1616 by Domo Ernst RN  Outcome: Ongoing  Goal: Control of chronic pain  Description: Control of chronic pain  4/9/2022 0219 by Jael Martell RN  Outcome: Ongoing  4/8/2022 1616 by Domo Ernst RN  Outcome: Ongoing

## 2022-04-09 NOTE — PROGRESS NOTES
Modi catheter removed. Small amount of bloody urine observed in modi upon removal. New modi catheter inserted. A couple small clots noted in drainage tube upon insertion. Large amount of yellow urine draining from modi. Will continue to monitor.

## 2022-04-09 NOTE — CONSULTS
Oracio Flores, [address] [facility], [facility]  Urology Consultation      Patient:  Apple Owen  MRN: [id number]  YOB: 1940    CHIEF COMPLAINT: Mild hematuria following Worley catheter exchange. Chronic Worley catheter. Patient follows with Dr. Andie Karimi:   The patient is a [de-identified] y.o. male who presents with multiple medical complaints. Confusion. Is under the hospital.  Patient has chronic Worley catheter. Managed by my partner. Worley catheter is exchanged every 30 days. Patient quite confused and not oriented at this point. Location bladder. Severity mild. Exacerbated by urinary tract infection. Urine currently growing gram-negative rods. Timing more than a year. No remitting symptoms. Patient's old records, notes and chart reviewed and summarized above.     Past Medical History:    Past Medical History:   Diagnosis Date    Hip fracture ([facility] [address]        Past Surgical History:    Past Surgical History:   Procedure Laterality Date    EYE SURGERY         Medications:      Current Facility-Administered Medications:     cefepime (MAXIPIME) 2000 mg IVPB minibag, 2,000 mg, IntraVENous, Q12H, DESTINEE Dunn CNP    potassium chloride (KLOR-CON M) extended release tablet 40 mEq, 40 mEq, Oral, PRN **OR** potassium bicarb-citric acid (EFFER-K) effervescent tablet 40 mEq, 40 mEq, Oral, PRN **OR** potassium chloride 10 mEq/100 mL IVPB (Peripheral Line), 10 mEq, IntraVENous, PRN, Viet Srinivasan MD    lidocaine (XYLOCAINE) 2 % uro-jet 5 mL, 5 mL, Topical, Once, Prabha Gauthier,     latanoprost (XALATAN) 0.005 % ophthalmic solution 1 drop, 1 drop, Right Eye, Nightly, DESTINEE Dunn CNP, 1 drop at 04/08/22 2111    tamsulosin (FLOMAX) capsule 0.8 mg, 0.8 mg, Oral, Daily, DESTINEE Dunn CNP, 0.8 mg at 04/09/22 0856    metFORMIN (GLUCOPHAGE) tablet 850 mg, 850 mg, Oral, BID WC, DESTINEE Dunn CNP, 850 mg at 04/09/22 0856    Vitamin D (CHOLECALCIFEROL) tablet 2,000 Units, 2,000 Units, Oral, Daily, DESTINEE Dunn CNP, 2,000 Units at 04/09/22 0856    sertraline (ZOLOFT) tablet 25 mg, 25 mg, Oral, Daily, DESTINEE Dunn CNP, 25 mg at 04/09/22 0856    sodium chloride flush 0.9 % injection 5-40 mL, 5-40 mL, IntraVENous, 2 times per day, DESTINEE Dunn CNP    sodium chloride flush 0.9 % injection 5-40 mL, 5-40 mL, IntraVENous, PRN, DESTINEE Dunn CNP    0.9 % sodium chloride infusion, , IntraVENous, PRN, DESTINEE Dunn CNP    enoxaparin (LOVENOX) injection 40 mg, 40 mg, SubCUTAneous, Daily, DESTINEE Dunn CNP, 40 mg at 04/09/22 0856    ondansetron (ZOFRAN-ODT) disintegrating tablet 4 mg, 4 mg, Oral, Q8H PRN **OR** ondansetron (ZOFRAN) injection 4 mg, 4 mg, IntraVENous, Q6H PRN, DESTINEE Dunn CNP    acetaminophen (TYLENOL) tablet 650 mg, 650 mg, Oral, Q6H PRN, 650 mg at 04/08/22 1153 **OR** acetaminophen (TYLENOL) suppository 650 mg, 650 mg, Rectal, Q6H PRN, DESTINEE Dunn CNP    polyethylene glycol (GLYCOLAX) packet 17 g, 17 g, Oral, Daily PRN, DESTINEE Dunn CNP    0.9 % sodium chloride infusion, , IntraVENous, Continuous, Kristina Monreal MD, Stopped at 04/09/22 0448    nicotine (NICODERM CQ) 21 MG/24HR 1 patch, 1 patch, TransDERmal, Daily, Kristina Monreal MD, 1 patch at 04/09/22 0857    oxyCODONE-acetaminophen (PERCOCET) 5-325 MG per tablet 1 tablet, 1 tablet, Oral, Q4H PRN, Kristina Monreal MD, 1 tablet at 04/08/22 1329    finasteride (PROSCAR) tablet 5 mg, 5 mg, Oral, Daily, Kristina Monreal MD, 5 mg at 04/09/22 0856    Allergies:  No Known Allergies    Social History:   Social History     Socioeconomic History    Marital status:       Spouse name: Not on file    Number of children: Not on file    Years of education: Not on file    Highest education level: Not on file   Occupational History    Not on file   Tobacco Use    Smoking status: Current Every Day Smoker     Packs/day: 1.00     Years: 60.00     Pack years: 60.00     Types: Cigarettes    Smokeless tobacco: Never Used   Substance and Sexual Activity    Alcohol use: No    Drug use: Yes     Types: Marijuana [place])    Sexual activity: Not on file   Other Topics Concern    Not on file   Social History Narrative    Not on file     Social Determinants of Health     Financial Resource Strain: Low Risk     Difficulty of Paying Living Expenses: Not hard at all   Food Insecurity: No Food Insecurity    Worried About Running Out of Food in the Last Year: Never true    Bright of Food in the Last Year: Never true   Transportation Needs:     Lack of Transportation (Medical): Not on file    Lack of Transportation (Non-Medical):  Not on file   Physical Activity:     Days of Exercise per Week: Not on file    Minutes of Exercise per Session: Not on file   Stress:     Feeling of Stress : Not on file   Social Connections:     Frequency of Communication with Friends and Family: Not on file    Frequency of Social Gatherings with Friends and Family: Not on file    Attends Congregation Services: Not on file    Active Member of [employer] or Organizations: Not on file    Attends Club or Organization Meetings: Not on file    Marital Status: Not on file   Intimate Partner Violence:     Fear of Current or Ex-Partner: Not on file    Emotionally Abused: Not on file    Physically Abused: Not on file    Sexually Abused: Not on file   Housing Stability:     Unable to Pay for Housing in the Last Year: Not on file    Number of [place] in the Last Year: Not on file    Unstable Housing in the Last Year: Not on file       Family History:    Family History   Problem Relation Age of Onset    High Blood Pressure Mother     Heart Disease Mother     Kidney Disease Mother     Other Sister         obesity       REVIEW OF SYSTEMS:  A comprehensive 14 point review of systems was obtained. Constitutional: No fatigue  Eyes: No blurry vision  Ears, nose, mouth, throat, face: No ringing in the ears; no facial droop. Respiratory: No cough or cold. Cardiovascular: No palpitations  Gastrointestinal: No diarrhea or constipation. Genitourinary: No burning with urination  Integument/Skin: No rashes  Hematologic/Lymphatic: No easy bruising  Musculoskeletal: No muscle pains  Neurologic: No weakness in the extremities. Psychiatric: No depression or suicidal thoughts. Endocrine: No heat or cold intolerances. Allergic/Immunologic: No current seasonal allergies; no skin hives. Physical Exam:    This a [de-identified] y.o. male   Vitals:    04/09/22 0826   BP: 131/68   Pulse: 77   Resp: 18   Temp: 97.8 °F (36.6 °C)   SpO2: 91%     Constitutional: Patient in no acute distress. Neuro: alert and oriented to person place and time. Head: Atraumatic and normocephalic. Neck: Trachea midline   Ext: 2+ radial pulses bilaterally. Psych: Mood and affect normal.  Skin: No rashes or bruising present. Lungs: Respiratory effort normal.  Cardiovascular:  Regular rhythm. Abdomen: Soft, non-tender, non-distended. Neither side has CVA tenderness on exam.  Bladder non-tender and not distended. Lymphatics: no palpable lymphadenopathy. Penis normal and circumcised  Urethral meatus normal  Scrotal exam normal  . Worley catheter in place draining clear and yellow urine.     Labs:  Recent Labs     04/08/22  0310 04/09/22  0553   WBC 9.0 8.7   HGB 13.2 12.6*   HCT 41.4 39.3*   MCV 93.9 94.7    178     Recent Labs     04/08/22  0310 04/09/22  0553    136   K 4.2 3.3*    103   CO2 22 22   BUN 34* 16   CREATININE 1.02 0.65*       Recent Labs     04/08/22  0420   COLORU Yellow   PHUR 5.5   WBCUA 20 TO 50   RBCUA TOO NUMEROUS TO COUNT   BACTERIA MODERATE*   SPECGRAV 1.025   LEUKOCYTESUR SMALL*   UROBILINOGEN Normal   BILIRUBINUR NEGATIVE       Culture Results:  [unfilled]      -----------------------------------------------------------------  Imaging Results:  CT HEAD WO CONTRAST    Result Date: 4/8/2022  EXAMINATION: CT OF THE HEAD WITHOUT CONTRAST  4/8/2022 3:16 am TECHNIQUE: CT of the head was performed without the administration of intravenous contrast. Dose modulation, iterative reconstruction, and/or weight based adjustment of the mA/kV was utilized to reduce the radiation dose to as low as reasonably achievable. COMPARISON: 01/12/2022. HISTORY: ORDERING SYSTEM PROVIDED HISTORY: AMS TECHNOLOGIST PROVIDED HISTORY: AMS Decision Support Exception - unselect if not a suspected or confirmed emergency medical condition->Emergency Medical Condition (MA) Reason for Exam: AMS, unable to get pt history Initial evaluation. FINDINGS: BRAIN/VENTRICLES: There is no acute intracranial hemorrhage, mass effect or midline shift. No abnormal extra-axial fluid collection. The gray-white differentiation is maintained without evidence of an acute infarct. There is no evidence of hydrocephalus. There are areas of hypoattenuation in the periventricular and subcortical white matter, which is nonspecific, but may represent chronic microvasvular ischemic change. There is prominence of the ventricles and sulci due to global parenchymal volume loss. Scattered atherosclerosis of the intracranial vasculature. ORBITS: The visualized portion of the orbits demonstrate no acute abnormality. SINUSES: There is scattered mucosal thickening of the bilateral ethmoid and right maxillary sinuses. The mastoid air cells demonstrate no acute abnormality. SOFT TISSUES/SKULL:  No acute abnormality of the visualized skull or soft tissues. 1. No acute intracranial abnormality. 2. Mild-to-moderate global parenchymal volume loss with chronic microvascular ischemic changes. 3. Scattered atherosclerosis of the intracranial vasculature.        Assessment and Plan   Impression:    Patient Active Problem List   Diagnosis    Type 2 diabetes mellitus, without long-term current use of insulin ([facility] [address]    Essential hypertension    Advanced open-angle glaucoma    Benign non-nodular prostatic hyperplasia with lower urinary tract symptoms    Dry eyes, bilateral    Elevated PSA    Blindness of both eyes    Closed right hip fracture (HCC)    UTI (urinary tract infection)    Moderate protein-calorie malnutrition (HCC)    Toxic metabolic encephalopathy    Tobacco abuse   BPH. Urinary retention. Plan: Worley catheter clear and yellow at this point. Worley catheter changed on admission. Will need to exchange again in 30 days. Debatable whether colonization of the urinary bladder needs to be treated given the fact that he has a chronic Worley. If there is concern that UTI could be causing confusion then treatment would be reasonable. Thank you for involving us in the care of Temi Mascorro. Should you have any questions, please do not hesitate to contact us at any time.     Jensen Rowell MD  11:38 AM 4/9/2022

## 2022-04-10 ENCOUNTER — APPOINTMENT (OUTPATIENT)
Dept: GENERAL RADIOLOGY | Age: 82
DRG: 698 | End: 2022-04-10
Payer: MEDICARE

## 2022-04-10 PROBLEM — J96.01 ACUTE RESPIRATORY FAILURE WITH HYPOXIA (HCC): Status: ACTIVE | Noted: 2022-04-10

## 2022-04-10 LAB
CULTURE: ABNORMAL
GLUCOSE BLD-MCNC: 102 MG/DL (ref 75–110)
GLUCOSE BLD-MCNC: 92 MG/DL (ref 75–110)
GLUCOSE BLD-MCNC: 98 MG/DL (ref 75–110)
SARS-COV-2, RAPID: NOT DETECTED
SPECIMEN DESCRIPTION: ABNORMAL
SPECIMEN DESCRIPTION: NORMAL

## 2022-04-10 PROCEDURE — 99232 SBSQ HOSP IP/OBS MODERATE 35: CPT | Performed by: STUDENT IN AN ORGANIZED HEALTH CARE EDUCATION/TRAINING PROGRAM

## 2022-04-10 PROCEDURE — 1200000000 HC SEMI PRIVATE

## 2022-04-10 PROCEDURE — 97530 THERAPEUTIC ACTIVITIES: CPT

## 2022-04-10 PROCEDURE — 2580000003 HC RX 258: Performed by: STUDENT IN AN ORGANIZED HEALTH CARE EDUCATION/TRAINING PROGRAM

## 2022-04-10 PROCEDURE — 87635 SARS-COV-2 COVID-19 AMP PRB: CPT

## 2022-04-10 PROCEDURE — 6370000000 HC RX 637 (ALT 250 FOR IP): Performed by: STUDENT IN AN ORGANIZED HEALTH CARE EDUCATION/TRAINING PROGRAM

## 2022-04-10 PROCEDURE — 97110 THERAPEUTIC EXERCISES: CPT

## 2022-04-10 PROCEDURE — 6360000002 HC RX W HCPCS: Performed by: NURSE PRACTITIONER

## 2022-04-10 PROCEDURE — 82947 ASSAY GLUCOSE BLOOD QUANT: CPT

## 2022-04-10 PROCEDURE — 2580000003 HC RX 258: Performed by: NURSE PRACTITIONER

## 2022-04-10 PROCEDURE — 99233 SBSQ HOSP IP/OBS HIGH 50: CPT | Performed by: PSYCHIATRY & NEUROLOGY

## 2022-04-10 PROCEDURE — 6360000002 HC RX W HCPCS: Performed by: STUDENT IN AN ORGANIZED HEALTH CARE EDUCATION/TRAINING PROGRAM

## 2022-04-10 PROCEDURE — 71045 X-RAY EXAM CHEST 1 VIEW: CPT

## 2022-04-10 RX ORDER — FLUTICASONE PROPIONATE 50 MCG
2 SPRAY, SUSPENSION (ML) NASAL DAILY
Status: DISCONTINUED | OUTPATIENT
Start: 2022-04-10 | End: 2022-04-16 | Stop reason: HOSPADM

## 2022-04-10 RX ORDER — QUETIAPINE FUMARATE 25 MG/1
25 TABLET, FILM COATED ORAL 2 TIMES DAILY PRN
Status: DISCONTINUED | OUTPATIENT
Start: 2022-04-10 | End: 2022-04-16 | Stop reason: HOSPADM

## 2022-04-10 RX ADMIN — ENOXAPARIN SODIUM 40 MG: 100 INJECTION SUBCUTANEOUS at 10:15

## 2022-04-10 RX ADMIN — CEFTRIAXONE SODIUM 1000 MG: 1 INJECTION, POWDER, FOR SOLUTION INTRAMUSCULAR; INTRAVENOUS at 15:16

## 2022-04-10 RX ADMIN — SODIUM CHLORIDE: 9 INJECTION, SOLUTION INTRAVENOUS at 03:57

## 2022-04-10 RX ADMIN — SODIUM CHLORIDE, PRESERVATIVE FREE 10 ML: 5 INJECTION INTRAVENOUS at 21:09

## 2022-04-10 RX ADMIN — LATANOPROST 1 DROP: 50 SOLUTION OPHTHALMIC at 21:09

## 2022-04-10 RX ADMIN — FLUTICASONE PROPIONATE 2 SPRAY: 50 SPRAY, METERED NASAL at 10:15

## 2022-04-10 NOTE — PROGRESS NOTES
[facility] [place] [facility]   IN-PATIENT SERVICE      NEUROLOGY CONSULT  NOTE            Date:   4/10/2022  Patient name:  Michell Brito  Date of admission:  4/8/2022  YOB: 1940      Overnight Event:     Patient is calmer, but more confused and more shortness of breath    Reason for Consult:      Acute encephalopathy    History of Present Illness: The patient is a [de-identified] y.o. male who presents with Altered Mental Status and Urinary Tract Infection  . The patient was seen and examined and the chart was reviewed. [de-identified]year old man with chronic modi catheter that is exchanged every 30 days, pt was admitted 2 days ago for acute mentation change and found to have UTI, pt was started on cefepime. Today, at the time I saw pt, pt is trying to climb out of bed and stated I want to go back to my bed, he required 3 people to hold him down. At that, he was already given 0.25mg ativan, I gave him another 2 doses of 1mg ativan, after about 15 mins, he calmed down and allow us to put him back to bed. Past Medical History:     Past Medical History:   Diagnosis Date    Hip fracture [facility])         Past Surgical History:     Past Surgical History:   Procedure Laterality Date    EYE SURGERY          Medications Prior to Admission:     Prior to Admission medications    Medication Sig Start Date End Date Taking?  Authorizing Provider   finasteride (PROSCAR) 5 MG tablet Take 5 mg by mouth daily   Yes Historical Provider, MD   tamsulosin (FLOMAX) 0.4 MG capsule Take 0.4 mg by mouth at bedtime   Yes Historical Provider, MD   calcium citrate (CALCITRATE) 950 (200 Ca) MG tablet Take 400 mg by mouth 2 times daily  12/23/21   Historical Provider, MD   acetaminophen (TYLENOL) 500 MG tablet Take 1,000 mg by mouth every 6 hours as needed 11/30/21   Historical Provider, MD   Cholecalciferol 50 MCG (2000 UT) TABS Take 1 tablet by mouth daily 1/18/22   Libia Lilly DO   sertraline (ZOLOFT) 25 MG tablet Take 1 tablet by mouth daily 22   Audi Morelos DO   metFORMIN (GLUCOPHAGE) 850 MG tablet Take 1 tablet by mouth 2 times daily (with meals) 21   Audi Morelos DO   latanoprost (XALATAN) 0.005 % ophthalmic solution Administer 1 drop to the right eye nightly. 10/27/16   Historical Provider, MD        Allergies:     Patient has no known allergies. Social History:     Tobacco:    reports that he has been smoking cigarettes. He has a 60.00 pack-year smoking history. He has never used smokeless tobacco.  Alcohol:      reports no history of alcohol use. Drug Use:  reports current drug use. Drug: Marijuana Sharri Wilkerson). Family History:     Family History   Problem Relation Age of Onset    High Blood Pressure Mother     Heart Disease Mother     Kidney Disease Mother     Other Sister         obesity       Review of Systems:       Constitutional Negative for fever and chills   HEENT Negative for ear discharge, ear pain, nosebleed. Negative for photophobia, headache. Musculoskeletal Negative for joint pain, negative for myalgia   Respiratory SOB   Cardiovascular Negative for palpitations, chest pain. Negative for orthopnea, claudication. Gastrointestinal Negative for nausea, vomiting. Negative for abdominal pain, diarrhea, blood in stool   Genitourinary  Negative for dysuria, hematuria. Negative for suprapubic pain. Negative for bladder incontinence.     Skin Negative for rash or itching   Hematology Negative for ecchymosis, anemia   Psychiatric confused         Physical Exam:   BP (!) 105/57   Pulse 80   Temp 98.1 °F (36.7 °C) (Oral)   Resp 20   Ht 5' 11\" (1.803 m)   Wt 131 lb 8 oz (59.6 kg)   SpO2 92%   BMI 18.34 kg/m²   Temp (24hrs), Av °F (36.7 °C), Min:97.5 °F (36.4 °C), Max:98.2 °F (36.8 °C)        General examination:      General Appearance:  alert, mildly distressed, slightly SOB  HEENT: Normocephalic, atraumatic, moist mucus membranes  Neck: supple, no carotid bruits, (-) nuchal GLUCOSE 190* 110*         Lab Results   Component Value Date    CHOL 193 10/30/2021    LDLCHOLESTEROL 97 10/30/2021    HDL 80 10/30/2021    TRIG 78 10/30/2021    ALT 13 04/08/2022    AST 17 04/08/2022    TSH 1.69 10/30/2021    INR 1.0 04/08/2022    LABA1C 6.5 08/27/2021    LABMICR 48 (H) 10/30/2021       No results found for: PHENYTOIN, PHENYTOIN, VALPROATE, CBMZ      Imaging/Diagnostics:      EEG:       No results found for this or any previous visit. No results found for this or any previous visit. No results found for this or any previous visit. No results found for this or any previous visit. No results found for this or any previous visit. No results found for this or any previous visit. No results found for this or any previous visit. Results for orders placed during the hospital encounter of 04/08/22    CT HEAD WO CONTRAST    Narrative  EXAMINATION:  CT OF THE HEAD WITHOUT CONTRAST  4/8/2022 3:16 am    TECHNIQUE:  CT of the head was performed without the administration of intravenous  contrast. Dose modulation, iterative reconstruction, and/or weight based  adjustment of the mA/kV was utilized to reduce the radiation dose to as low  as reasonably achievable. COMPARISON:  01/12/2022. HISTORY:  ORDERING SYSTEM PROVIDED HISTORY: AMS  TECHNOLOGIST PROVIDED HISTORY:    AMS  Decision Support Exception - unselect if not a suspected or confirmed  emergency medical condition->Emergency Medical Condition (MA)  Reason for Exam: AMS, unable to get pt history    Initial evaluation. FINDINGS:  BRAIN/VENTRICLES: There is no acute intracranial hemorrhage, mass effect or  midline shift. No abnormal extra-axial fluid collection. The gray-white  differentiation is maintained without evidence of an acute infarct. There is  no evidence of hydrocephalus.  There are areas of hypoattenuation in the  periventricular and subcortical white matter, which is nonspecific, but may  represent chronic microvasvular ischemic change. There is prominence of the  ventricles and sulci due to global parenchymal volume loss. Scattered  atherosclerosis of the intracranial vasculature. ORBITS: The visualized portion of the orbits demonstrate no acute abnormality. SINUSES: There is scattered mucosal thickening of the bilateral ethmoid and  right maxillary sinuses. The mastoid air cells demonstrate no acute  abnormality. SOFT TISSUES/SKULL:  No acute abnormality of the visualized skull or soft  tissues. Impression  1. No acute intracranial abnormality. 2. Mild-to-moderate global parenchymal volume loss with chronic microvascular  ischemic changes. 3. Scattered atherosclerosis of the intracranial vasculature. CT head: nothing acute, chronic atrophy         I personally reviewed all of the above medications, clinical laboratory, imaging and other diagnostic tests. Impression:      [de-identified]year old man with chronic modi cathether, now with UTI and p/w acute encephalopathy    Plan:      His encephalopathy is most likely caused by his UTI, on neuro exam there is no focal deficit   Cefepime discontinued and pt is now on ceftriaxone   Pt is now more shortness of breath, I wonder if there is an underlying pulmonary issue like pneumonia?  Ok use to seroquel 25mg BID PRN as needed during night to also help him sleep, it has been reported pt didn't sleep well   Will follow, no further neurological testing at this time as the primary issue is infection      I spend a total of 36 minutes with greater than 60% of time spent face to face, counseling, coordinating care, examining patient, reviewing images and labs personally, and speaking with team.         Thank you for this very interesting consultation.       Electronically signed by Angelic Gauthier MD on 4/10/2022 at 11:54 AM      MD [address]  Neurology

## 2022-04-10 NOTE — PLAN OF CARE
Problem: Falls - Risk of:  Goal: Will remain free from falls  Description: Will remain free from falls  4/10/2022 0004 by Renato Christianson RN  Outcome: Ongoing     Problem: Falls - Risk of:  Goal: Absence of physical injury  Description: Absence of physical injury  Outcome: Ongoing     Problem: Skin Integrity:  Goal: Will show no infection signs and symptoms  Description: Will show no infection signs and symptoms  Outcome: Ongoing     Problem: Safety:  Goal: Ability to remain free from injury will improve  Description: Ability to remain free from injury will improve  4/10/2022 0004 by Renato Christianson RN  Outcome: Ongoing

## 2022-04-10 NOTE — FLOWSHEET NOTE
Donte Alex  PROGRESS NOTE    Room # 2009/2009-01   Name: Bereket Andrade              Reason for visit: Routine    I visited the patient. Admit Date & Time: 4/8/2022  2:52 AM    Assessment:  Bereket Andrade is a [de-identified] y.o. male. Upon entering the room patient was sleeping. Intervention:   provided a ministry presence and brief prayer. Outcome:  Patient did not respond. Plan:  Chaplains will remain available to offer spiritual and emotional support as needed. Electronically signed by Alexander Britton.  Chaplain Jayy, on 4/10/2022 at 3:15 PM.  Jeet       04/10/22 1514   Encounter Summary   Services provided to: Patient and family together   Referral/Consult From: [address] [place] [address] Family members   Continue Visiting   (4-10-22 PT, sleeping, on bed watch)   Complexity of Encounter Low   Length of Encounter 15 minutes   Routine   Type Initial   Assessment Sleeping   Intervention Prayer

## 2022-04-10 NOTE — PLAN OF CARE
Problem: Falls - Risk of:  Goal: Will remain free from falls  Description: Will remain free from falls  4/10/2022 1059 by Sveta Sullivan RN  Outcome: Ongoing  Note: Siderails up x 2  Hourly rounding  Call light in reach  Instructed to call for assist before attempting out of bed.   Remains free from falls and accidental injury at this time   Floor free from obstacles  Bed is locked and in lowest position  Adequate lighting provided  Bed alarm on, [place] star and Stay with Me signs posted  1:1 sitter d/t pulling at lines, restlessness, getting OOB     Problem: Sensory:  Goal: General experience of comfort will improve  Description: General experience of comfort will improve  4/10/2022 1059 by Sveta Sullivan RN  Outcome: Ongoing     Problem: Urinary Elimination:  Goal: Signs and symptoms of infection will decrease  Description: Signs and symptoms of infection will decrease  4/10/2022 1059 by Sveta Sullivan RN  Outcome: Ongoing     Problem: Coping:  Goal: Ability to remain calm will improve  Description: Ability to remain calm will improve  4/10/2022 1059 by Sveta uSllivan RN  Outcome: Ongoing     Problem: Self-Care:  Goal: Ability to participate in self-care as condition permits will improve  Description: Ability to participate in self-care as condition permits will improve  4/10/2022 1059 by Sveta Sullivan RN  Outcome: Ongoing

## 2022-04-10 NOTE — PROGRESS NOTES
Physical Therapy  Facility/Department: [facility]  Daily Treatment Note  NAME: Bereket Andrade  : 1940  MRN: [id number]    Date of Service: 4/10/2022    Discharge Recommendations:  Patient would benefit from continued therapy after discharge   Assessment   Body structures, Functions, Activity limitations: Decreased functional mobility ; Decreased endurance;Decreased strength;Decreased balance;Decreased posture  Assessment: Pt with confusion and sitter present this date. Pt with deficits in bed mobility, transfers, strength, and endurance  requiring moderate assistance x 2 for all mobility. The pt was not able to ambulate this date and is at risk for falls. The savanna stedy was used with 2 assist for safe transfers to chair. See AM-Pac score for current level of function. Prognosis: Good  Decision Making: High Complexity  REQUIRES PT FOLLOW UP: Yes  Activity Tolerance  Activity Tolerance: Patient limited by cognitive status; Patient limited by endurance; Patient limited by fatigue   Patient Diagnosis(es): The primary encounter diagnosis was Altered mental status, unspecified altered mental status type. A diagnosis of Acute cystitis with hematuria was also pertinent to this visit. has a past medical history of Hip fracture ([address] has a past surgical history that includes eye surgery. 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), Foely cath, easily agitated, up with assist, LUE IV, ALARMS, easily agitiated  Subjective   General  Chart Reviewed: Yes  Family / Caregiver Present: No  General Comment  Comments: OK for PT per Marge Blanc RN    Orientation  Orientation  Overall Orientation Status: Impaired  Cognition   Objective   Bed mobility  Rolling:  Moderate assistance  Supine to sit: Moderate assistance ( fair sitting balance with CGA to min A to maintain upright posture, heavy posterior lean at times)   Scooting: Moderate assistance  Transfers  Sit to Stand: Moderate Assistance;2 Person Assistance  Stand to sit: Moderate Assistance;2 Person Assistance  Bed to Chair: Dependent/Total (; 2 Person Assistance with savanna wilson)  Comment: Massiel wilson required for safe transfer to a chair  Ambulation  Ambulation?: No     Balance  Posture: Fair  Sitting - Static: Fair;+  Sitting - Dynamic: Fair  Exercises  Comments: EOB sitting approx 8 min      AM-PAC Score     AM-PAC Inpatient Mobility without Stair Climbing Raw Score : 8 (04/10/22 0948)  AM-PAC Inpatient without Stair Climbing T-Scale Score : 30.65 (04/10/22 0948)  Mobility Inpatient CMS 0-100% Score: 80.91 (04/10/22 0948)  Mobility Inpatient without Stair CMS G-Code Modifier : CM (04/10/22 7783)       Goals  Short term goals  Time Frame for Short term goals: 12 treatments  Short term goal 1: Independent transfers  Short term goal 2: SBA ambulation w/ ' x 1/ CGA ascending/descending 7 steps w/ B HR  Short term goal 3: Good standing balance and posture  Short term goal 4: Tolerate 30 min ther act  Short term goal 5: 5/5 B LE strength  Patient Goals   Patient goals : Home to my own apt.     Plan    Plan  Times per week: 1-2x/day,5-6 days/week  Current Treatment Recommendations: Strengthening,Transfer Training,Balance Training,Endurance Training,Gait Training,Stair training  Safety Devices  Type of devices: Nurse notified,Left in chair,Gait belt,Call light within reach,Chair alarm in place,Sitter present,All fall risk precautions in place  Restraints  Initially in place: No     Therapy Time   Individual Concurrent Group Co-treatment   Time In 0842         Time Out  0909         Minutes  [address] [place] [address]

## 2022-04-10 NOTE — PROGRESS NOTES
[facility]  Office: [address], DO, Yuniel Ramos, DO, Dawnford Pascal, DO, Cordelia Odonnell, DO, Gillian Gar MD, Allison Rodriguez MD, Edgard You MD, Victor Manuel Acosta MD, Melquiades Andrade MD, Lula Santana MD, Sophia Chowdhury MD, Nohemy Villanueva, DO, Ralph Rowley, DO, Ines Gonsales MD,  Mary Jay, DO, Sabas Lange MD, Radha Peres MD, Moni Avendano MD, Bertin Qiu DO, Joan Frias MD, Brooklyn Castro MD, Chapin Cort[facility], [facility] [place], CNP, Bill Elliott, CNP, Naila Niño, CNP, Aida Marie, CNP, Eddi Jordan, [facility], Scotty Bryant, CNP, Laura Lozada PA-C, Manolo Al, [facility], Yung Castro, CNP, Lizet Monteiro, CNP, Caty Velasquez, CNP, Kristin Sanabria, CNS, Jeannie Moreland, [facility], Blas Mock, CNP, Nohemi Hardy, CNP, Ashleigh Kong, [facility]    Progress Note    4/10/2022    11:28 AM    Name:   Abel Collet  MRN:     [id number]     [place]:      [de-identified]   Room:   2009/2009-01   Day:  2  Admit Date:  4/8/2022  2:52 AM    PCP:   Yvonne García DO  Code Status:  Full Code    Subjective:     C/C:   Chief Complaint   Patient presents with   [facility] Altered Mental Status    Urinary Tract Infection     Interval History Status:worse    Patient is still very confused  More drowsy, did not receive any sedatives this am, did get the seroquel last night  Was not oriented to time place or person  Had episode of cough this morning with liquid  Concern for aspiration  Had desaturation episodes to 80s  Now requiring 4 lt oxygen    Brief History:     82-year-old male with known medical history of chronic urinary retention, on chronic Worley catheter presents to the hospital for altered mental status. As per family patient has been acting more confused for last 2 days. Patient gets his Worley changed every month. Per chart review patient had cloudy urine and was acting very confused.   On my evaluation patient is oriented to place and self but not very cooperative in my history and exam.  States that he has pain in the scrotal area. Has chronic Worley in place. Review of Systems:   Patient is altered, review of system not possible    Medications: Allergies:  No Known Allergies    Current Meds:   Scheduled Meds:    fluticasone  2 spray Each Nostril Daily    cefTRIAXone (ROCEPHIN) IV  1,000 mg IntraVENous Q24H    lidocaine  5 mL Topical Once    latanoprost  1 drop Right Eye Nightly    tamsulosin  0.8 mg Oral Daily    metFORMIN  850 mg Oral BID WC    Vitamin D  2,000 Units Oral Daily    sertraline  25 mg Oral Daily    sodium chloride flush  5-40 mL IntraVENous 2 times per day    enoxaparin  40 mg SubCUTAneous Daily    nicotine  1 patch TransDERmal Daily    finasteride  5 mg Oral Daily     Continuous Infusions:    sodium chloride       PRN Meds: QUEtiapine, potassium chloride **OR** potassium alternative oral replacement **OR** potassium chloride, sodium chloride flush, sodium chloride, ondansetron **OR** ondansetron, acetaminophen **OR** acetaminophen, polyethylene glycol, oxyCODONE-acetaminophen    Data:     Past Medical History:   has a past medical history of Hip fracture ([facility] [address] Social History:   reports that he has been smoking cigarettes. He has a 60.00 pack-year smoking history. He has never used smokeless tobacco. He reports current drug use. Drug: Marijuana Dana Aver). He reports that he does not drink alcohol.      Family History:   Family History   Problem Relation Age of Onset    High Blood Pressure Mother     Heart Disease Mother     Kidney Disease Mother     Other Sister         obesity       Vitals:  BP (!) 105/57   Pulse 80   Temp 98.1 °F (36.7 °C) (Oral)   Resp 20   Ht 5' 11\" (1.803 m)   Wt 131 lb 8 oz (59.6 kg)   SpO2 92%   BMI 18.34 kg/m²   Temp (24hrs), Av °F (36.7 °C), Min:97.5 °F (36.4 °C), Max:98.2 °F (36.8 °C)    Recent Labs     22  1441   POCGLU 194* I/O (24Hr): Intake/Output Summary (Last 24 hours) at 4/10/2022 1128  Last data filed at 4/10/2022 1021  Gross per 24 hour   Intake 2685.25 ml   Output 2050 ml   Net 635.25 ml       Labs:  Hematology:  Recent Labs     04/08/22 0310 04/09/22  0553   WBC 9.0 8.7   RBC 4.41 4.15*   HGB 13.2 12.6*   HCT 41.4 39.3*   MCV 93.9 94.7   MCH 29.9 30.4   MCHC 31.9 32.1   RDW 14.5* 14.5*    178   MPV 10.2 9.9   INR 1.0  --      Chemistry:  Recent Labs     04/08/22 0310 04/09/22  0553    136   K 4.2 3.3*    103   CO2 22 22   GLUCOSE 190* 110*   BUN 34* 16   CREATININE 1.02 0.65*   MG  --  1.7   ANIONGAP 14 11   LABGLOM >60 >60   GFRAA >60 >60   CALCIUM 8.9 7.6*     Recent Labs     04/08/22 0310 04/09/22  1441   PROT 6.5  --    LABALBU 3.8  --    AST 17  --    ALT 13  --    ALKPHOS 84  --    BILITOT 0.43  --    POCGLU  --  194*     ABG:No results found for: POCPH, PHART, PH, POCPCO2, FCE2JWA, PCO2, POCPO2, PO2ART, PO2, POCHCO3, LVU2KCU, HCO3, NBEA, PBEA, BEART, BE, THGBART, THB, SQI4USW, FKSF9DNK, S6IOMDJU, O2SAT, FIO2  Lab Results   Component Value Date/Time    SPECIAL R FA 8ML 04/08/2022 04:20 AM     Lab Results   Component Value Date/Time    CULTURE NO GROWTH 2 DAYS 04/08/2022 04:20 AM    CULTURE SERRATIA MARCESCENS >234125 CFU/ML (A) 04/08/2022 04:20 AM       Radiology:  CT HEAD WO CONTRAST    Result Date: 4/8/2022  1. No acute intracranial abnormality. 2. Mild-to-moderate global parenchymal volume loss with chronic microvascular ischemic changes. 3. Scattered atherosclerosis of the intracranial vasculature.        Physical Examination:        General appearance: confused, drowsy  Mental Status:  Not oriented to person, place and time and normal affect  Lungs:  bilateral crackles at bases, on 4lt nc oxygen  Heart:  regular rate and rhythm, no murmur  Abdomen:  soft, nontender, nondistended, normal bowel sounds, no masses, hepatomegaly, splenomegaly, modi in place  Extremities:  no edema, redness,

## 2022-04-10 NOTE — PLAN OF CARE
Lazara Babb RN to discuss video swallow to be done 4/11/2022.   Requested COVID test to be done prior to exam

## 2022-04-10 NOTE — CARE COORDINATION
CASE MANAGEMENT NOTE:    Admission Date:  4/8/2022 Bhavna Mckenzie is a [de-identified] y.o.  male    Admitted for : UTI (urinary tract infection) [N39.0]  Acute cystitis with hematuria [N30.01]  Altered mental status, unspecified altered mental status type [R41.82]    Met with:  Family Nathanael Adams  [phone]    PCP:   Dolores BENOIT [address]:  [facility]    Is patient alert and oriented at time of discussion:  No    Current Residence/ Living Arrangements:  at home dependent on family care             Current Services PTA:  No    Does patient go to outpatient dialysis: No  If yes, location and chair time: n/a    Is patient agreeable to VNS: No    Freedom of choice provided:  Yes    List of [address] provided: No    VNS chosen:  No    DME:  Walker rolling walker and walker with seat available    Home Oxygen: No    Nebulizer: No    CPAP/BIPAP: No    Supplier: N/A    Potential Assistance Needed: Yes    SNF needed: Yes    Freedom of choice and list provided: Yes    Pharmacy:  [facility] [address]       Does Patient want to use MEDS to BEDS? No    Is patient currently receiving oral anticoagulation therapy? NA    Is the Patient an [facility] SM[facility] with Readmission Risk Score greater than 14%? No  If yes, pt needs a follow up appointment made within 7 days. Family Members/Caregivers that pt would like involved in their care:    Yes    If yes, list name here:  Yogesh Hanna daughter [phone]    Transportation Provider:  Family             Discharge Plan:  The Plan for Transition of Care is related to the following treatment goals:to return home after short term snf rehab at [facility] of [address] or [facility] of [address]. Referrals faxed to all three snf's. Daughter states pt is blind. The Patient and/or patient representative daughter was provided with a choice of provider and agrees   with the discharge plan.  [x] Yes [] No    Freedom of choice list was provided with basic dialogue that supports the patient's individualized plan of care/goals, treatment preferences and shares the quality data associated with the providers.  [x] Yes [] No                 Electronically signed by: Marylen Platts, MSW, LSW on 4/10/2022 at 4:20 PM

## 2022-04-11 ENCOUNTER — APPOINTMENT (OUTPATIENT)
Dept: GENERAL RADIOLOGY | Age: 82
DRG: 698 | End: 2022-04-11
Payer: MEDICARE

## 2022-04-11 ENCOUNTER — APPOINTMENT (OUTPATIENT)
Dept: CT IMAGING | Age: 82
DRG: 698 | End: 2022-04-11
Payer: MEDICARE

## 2022-04-11 LAB
ABSOLUTE EOS #: 0.06 K/UL (ref 0–0.4)
ABSOLUTE IMMATURE GRANULOCYTE: 0.06 K/UL (ref 0–0.3)
ABSOLUTE LYMPH #: 0.99 K/UL (ref 1–4.8)
ABSOLUTE MONO #: 0.58 K/UL (ref 0.2–0.8)
ANION GAP SERPL CALCULATED.3IONS-SCNC: 9 MMOL/L (ref 9–17)
ATYPICAL LYMPHOCYTE ABSOLUTE COUNT: 0.17 K/UL
ATYPICAL LYMPHOCYTES: 3 %
BASOPHILS # BLD: 0 %
BASOPHILS ABSOLUTE: 0 K/UL (ref 0–0.2)
BUN BLDV-MCNC: 19 MG/DL (ref 8–23)
BUN/CREAT BLD: 28 (ref 9–20)
CALCIUM SERPL-MCNC: 8.6 MG/DL (ref 8.6–10.4)
CHLORIDE BLD-SCNC: 104 MMOL/L (ref 98–107)
CO2: 25 MMOL/L (ref 20–31)
CREAT SERPL-MCNC: 0.67 MG/DL (ref 0.7–1.2)
D-DIMER QUANTITATIVE: 1.26 MG/L FEU (ref 0–0.59)
EOSINOPHILS RELATIVE PERCENT: 1 % (ref 1–4)
FIO2: 50
GFR AFRICAN AMERICAN: >60 ML/MIN
GFR NON-AFRICAN AMERICAN: >60 ML/MIN
GFR SERPL CREATININE-BSD FRML MDRD: ABNORMAL ML/MIN/{1.73_M2}
GLUCOSE BLD-MCNC: 117 MG/DL (ref 75–110)
GLUCOSE BLD-MCNC: 199 MG/DL (ref 75–110)
GLUCOSE BLD-MCNC: 82 MG/DL (ref 70–99)
GLUCOSE BLD-MCNC: 85 MG/DL (ref 75–110)
GLUCOSE BLD-MCNC: 86 MG/DL (ref 75–110)
HCT VFR BLD CALC: 38.5 % (ref 40.7–50.3)
HEMOGLOBIN: 12.4 G/DL (ref 13–17)
IMMATURE GRANULOCYTES: 1 %
LYMPHOCYTES # BLD: 17 % (ref 24–44)
MCH RBC QN AUTO: 30.2 PG (ref 25.2–33.5)
MCHC RBC AUTO-ENTMCNC: 32.2 G/DL (ref 28.4–34.8)
MCV RBC AUTO: 93.7 FL (ref 82.6–102.9)
MONOCYTES # BLD: 10 % (ref 1–7)
NRBC AUTOMATED: 0 PER 100 WBC
O2 DEVICE/FLOW/%: ABNORMAL
PDW BLD-RTO: 14.5 % (ref 11.8–14.4)
PLATELET # BLD: 202 K/UL (ref 138–453)
PMV BLD AUTO: 9.8 FL (ref 8.1–13.5)
POC HCO3: 24.3 MMOL/L (ref 21–28)
POC O2 SATURATION: 87 % (ref 94–98)
POC PCO2: 34.3 MM HG (ref 35–48)
POC PH: 7.46 (ref 7.35–7.45)
POC PO2: 49.9 MM HG (ref 83–108)
POC TCO2: 25 MMOL/L (ref 22–30)
POSITIVE BASE EXCESS, ART: 1 (ref 0–3)
POTASSIUM SERPL-SCNC: 3.8 MMOL/L (ref 3.7–5.3)
PROCALCITONIN: 0.12 NG/ML
RBC # BLD: 4.11 M/UL (ref 4.21–5.77)
SEG NEUTROPHILS: 68 % (ref 36–66)
SEGMENTED NEUTROPHILS ABSOLUTE COUNT: 3.94 K/UL (ref 1.8–7.7)
SODIUM BLD-SCNC: 138 MMOL/L (ref 135–144)
WBC # BLD: 5.8 K/UL (ref 3.5–11.3)

## 2022-04-11 PROCEDURE — 80048 BASIC METABOLIC PNL TOTAL CA: CPT

## 2022-04-11 PROCEDURE — 92611 MOTION FLUOROSCOPY/SWALLOW: CPT

## 2022-04-11 PROCEDURE — 1200000000 HC SEMI PRIVATE

## 2022-04-11 PROCEDURE — 85025 COMPLETE CBC W/AUTO DIFF WBC: CPT

## 2022-04-11 PROCEDURE — 6360000002 HC RX W HCPCS: Performed by: INTERNAL MEDICINE

## 2022-04-11 PROCEDURE — 2580000003 HC RX 258: Performed by: NURSE PRACTITIONER

## 2022-04-11 PROCEDURE — 82803 BLOOD GASES ANY COMBINATION: CPT

## 2022-04-11 PROCEDURE — 6370000000 HC RX 637 (ALT 250 FOR IP): Performed by: NURSE PRACTITIONER

## 2022-04-11 PROCEDURE — 74230 X-RAY XM SWLNG FUNCJ C+: CPT

## 2022-04-11 PROCEDURE — 6360000004 HC RX CONTRAST MEDICATION: Performed by: STUDENT IN AN ORGANIZED HEALTH CARE EDUCATION/TRAINING PROGRAM

## 2022-04-11 PROCEDURE — 82374 ASSAY BLOOD CARBON DIOXIDE: CPT

## 2022-04-11 PROCEDURE — 6370000000 HC RX 637 (ALT 250 FOR IP): Performed by: INTERNAL MEDICINE

## 2022-04-11 PROCEDURE — 97116 GAIT TRAINING THERAPY: CPT

## 2022-04-11 PROCEDURE — 97110 THERAPEUTIC EXERCISES: CPT

## 2022-04-11 PROCEDURE — 6360000002 HC RX W HCPCS: Performed by: NURSE PRACTITIONER

## 2022-04-11 PROCEDURE — 36600 WITHDRAWAL OF ARTERIAL BLOOD: CPT

## 2022-04-11 PROCEDURE — 6370000000 HC RX 637 (ALT 250 FOR IP): Performed by: STUDENT IN AN ORGANIZED HEALTH CARE EDUCATION/TRAINING PROGRAM

## 2022-04-11 PROCEDURE — 36415 COLL VENOUS BLD VENIPUNCTURE: CPT

## 2022-04-11 PROCEDURE — 99232 SBSQ HOSP IP/OBS MODERATE 35: CPT | Performed by: STUDENT IN AN ORGANIZED HEALTH CARE EDUCATION/TRAINING PROGRAM

## 2022-04-11 PROCEDURE — 97530 THERAPEUTIC ACTIVITIES: CPT

## 2022-04-11 PROCEDURE — 94761 N-INVAS EAR/PLS OXIMETRY MLT: CPT

## 2022-04-11 PROCEDURE — 83036 HEMOGLOBIN GLYCOSYLATED A1C: CPT

## 2022-04-11 PROCEDURE — 6360000002 HC RX W HCPCS: Performed by: STUDENT IN AN ORGANIZED HEALTH CARE EDUCATION/TRAINING PROGRAM

## 2022-04-11 PROCEDURE — 99231 SBSQ HOSP IP/OBS SF/LOW 25: CPT | Performed by: PSYCHIATRY & NEUROLOGY

## 2022-04-11 PROCEDURE — 97535 SELF CARE MNGMENT TRAINING: CPT

## 2022-04-11 PROCEDURE — 94640 AIRWAY INHALATION TREATMENT: CPT

## 2022-04-11 PROCEDURE — 82947 ASSAY GLUCOSE BLOOD QUANT: CPT

## 2022-04-11 PROCEDURE — 71260 CT THORAX DX C+: CPT

## 2022-04-11 PROCEDURE — 2580000003 HC RX 258: Performed by: STUDENT IN AN ORGANIZED HEALTH CARE EDUCATION/TRAINING PROGRAM

## 2022-04-11 PROCEDURE — 85379 FIBRIN DEGRADATION QUANT: CPT

## 2022-04-11 PROCEDURE — 2700000000 HC OXYGEN THERAPY PER DAY

## 2022-04-11 RX ORDER — NICOTINE POLACRILEX 4 MG
15 LOZENGE BUCCAL PRN
Status: DISCONTINUED | OUTPATIENT
Start: 2022-04-11 | End: 2022-04-11 | Stop reason: CLARIF

## 2022-04-11 RX ORDER — IPRATROPIUM BROMIDE AND ALBUTEROL SULFATE 2.5; .5 MG/3ML; MG/3ML
1 SOLUTION RESPIRATORY (INHALATION) EVERY 4 HOURS PRN
Status: DISCONTINUED | OUTPATIENT
Start: 2022-04-11 | End: 2022-04-16 | Stop reason: HOSPADM

## 2022-04-11 RX ORDER — DEXTROSE MONOHYDRATE 50 MG/ML
100 INJECTION, SOLUTION INTRAVENOUS PRN
Status: DISCONTINUED | OUTPATIENT
Start: 2022-04-11 | End: 2022-04-16 | Stop reason: HOSPADM

## 2022-04-11 RX ORDER — SODIUM CHLORIDE 0.9 % (FLUSH) 0.9 %
10 SYRINGE (ML) INJECTION PRN
Status: DISCONTINUED | OUTPATIENT
Start: 2022-04-11 | End: 2022-04-16 | Stop reason: HOSPADM

## 2022-04-11 RX ORDER — DEXTROSE MONOHYDRATE 25 G/50ML
12.5 INJECTION, SOLUTION INTRAVENOUS PRN
Status: DISCONTINUED | OUTPATIENT
Start: 2022-04-11 | End: 2022-04-11 | Stop reason: CLARIF

## 2022-04-11 RX ORDER — METHYLPREDNISOLONE SODIUM SUCCINATE 40 MG/ML
40 INJECTION, POWDER, LYOPHILIZED, FOR SOLUTION INTRAMUSCULAR; INTRAVENOUS EVERY 8 HOURS
Status: DISCONTINUED | OUTPATIENT
Start: 2022-04-11 | End: 2022-04-15

## 2022-04-11 RX ORDER — IPRATROPIUM BROMIDE AND ALBUTEROL SULFATE 2.5; .5 MG/3ML; MG/3ML
1 SOLUTION RESPIRATORY (INHALATION) 4 TIMES DAILY
Status: DISCONTINUED | OUTPATIENT
Start: 2022-04-11 | End: 2022-04-11

## 2022-04-11 RX ORDER — BUDESONIDE 0.5 MG/2ML
0.5 INHALANT ORAL 2 TIMES DAILY
Status: DISCONTINUED | OUTPATIENT
Start: 2022-04-11 | End: 2022-04-16 | Stop reason: HOSPADM

## 2022-04-11 RX ORDER — 0.9 % SODIUM CHLORIDE 0.9 %
100 INTRAVENOUS SOLUTION INTRAVENOUS ONCE
Status: DISCONTINUED | OUTPATIENT
Start: 2022-04-11 | End: 2022-04-16 | Stop reason: HOSPADM

## 2022-04-11 RX ORDER — IPRATROPIUM BROMIDE AND ALBUTEROL SULFATE 2.5; .5 MG/3ML; MG/3ML
1 SOLUTION RESPIRATORY (INHALATION)
Status: DISCONTINUED | OUTPATIENT
Start: 2022-04-11 | End: 2022-04-11

## 2022-04-11 RX ORDER — IPRATROPIUM BROMIDE AND ALBUTEROL SULFATE 2.5; .5 MG/3ML; MG/3ML
1 SOLUTION RESPIRATORY (INHALATION) EVERY 4 HOURS PRN
Status: DISCONTINUED | OUTPATIENT
Start: 2022-04-11 | End: 2022-04-11 | Stop reason: SDUPTHER

## 2022-04-11 RX ADMIN — SODIUM CHLORIDE, PRESERVATIVE FREE 10 ML: 5 INJECTION INTRAVENOUS at 10:21

## 2022-04-11 RX ADMIN — CEFTRIAXONE SODIUM 1000 MG: 1 INJECTION, POWDER, FOR SOLUTION INTRAMUSCULAR; INTRAVENOUS at 15:27

## 2022-04-11 RX ADMIN — Medication 80 ML: at 10:17

## 2022-04-11 RX ADMIN — BUDESONIDE 500 MCG: 0.5 SUSPENSION RESPIRATORY (INHALATION) at 17:41

## 2022-04-11 RX ADMIN — QUETIAPINE FUMARATE 25 MG: 25 TABLET ORAL at 21:57

## 2022-04-11 RX ADMIN — IPRATROPIUM BROMIDE AND ALBUTEROL SULFATE 1 AMPULE: 2.5; .5 SOLUTION RESPIRATORY (INHALATION) at 09:32

## 2022-04-11 RX ADMIN — IOPAMIDOL 75 ML: 755 INJECTION, SOLUTION INTRAVENOUS at 10:17

## 2022-04-11 RX ADMIN — IPRATROPIUM BROMIDE AND ALBUTEROL SULFATE 1 AMPULE: 2.5; .5 SOLUTION RESPIRATORY (INHALATION) at 17:40

## 2022-04-11 RX ADMIN — METFORMIN HYDROCHLORIDE 850 MG: 850 TABLET ORAL at 17:33

## 2022-04-11 RX ADMIN — LATANOPROST 1 DROP: 50 SOLUTION OPHTHALMIC at 21:46

## 2022-04-11 RX ADMIN — SODIUM CHLORIDE, PRESERVATIVE FREE 10 ML: 5 INJECTION INTRAVENOUS at 10:17

## 2022-04-11 RX ADMIN — ENOXAPARIN SODIUM 40 MG: 100 INJECTION SUBCUTANEOUS at 08:54

## 2022-04-11 RX ADMIN — INSULIN LISPRO 1 UNITS: 100 INJECTION, SOLUTION INTRAVENOUS; SUBCUTANEOUS at 21:46

## 2022-04-11 RX ADMIN — METHYLPREDNISOLONE SODIUM SUCCINATE 40 MG: 40 INJECTION, POWDER, FOR SOLUTION INTRAMUSCULAR; INTRAVENOUS at 17:34

## 2022-04-11 RX ADMIN — FLUTICASONE PROPIONATE 2 SPRAY: 50 SPRAY, METERED NASAL at 08:54

## 2022-04-11 RX ADMIN — ACETAMINOPHEN 325MG 650 MG: 325 TABLET ORAL at 22:02

## 2022-04-11 ASSESSMENT — PAIN DESCRIPTION - LOCATION: LOCATION: BACK

## 2022-04-11 ASSESSMENT — PAIN SCALES - GENERAL: PAINLEVEL_OUTOF10: 3

## 2022-04-11 ASSESSMENT — PAIN DESCRIPTION - DESCRIPTORS: DESCRIPTORS: ACHING

## 2022-04-11 ASSESSMENT — PAIN DESCRIPTION - ORIENTATION: ORIENTATION: LEFT;LOWER

## 2022-04-11 ASSESSMENT — PAIN - FUNCTIONAL ASSESSMENT: PAIN_FUNCTIONAL_ASSESSMENT: ACTIVITIES ARE NOT PREVENTED

## 2022-04-11 ASSESSMENT — PAIN DESCRIPTION - FREQUENCY: FREQUENCY: INTERMITTENT

## 2022-04-11 ASSESSMENT — PAIN DESCRIPTION - ONSET: ONSET: ON-GOING

## 2022-04-11 ASSESSMENT — PAIN DESCRIPTION - PROGRESSION: CLINICAL_PROGRESSION: GRADUALLY IMPROVING

## 2022-04-11 ASSESSMENT — PAIN DESCRIPTION - PAIN TYPE: TYPE: CHRONIC PAIN

## 2022-04-11 NOTE — PROGRESS NOTES
Patient returned to room form CT scan in good condition. Patient reoriented to room and surroundings. Patient's modi bag and tubing changed by writer.

## 2022-04-11 NOTE — PROGRESS NOTES
kg/m²   Temp (24hrs), Av.3 °F (36.8 °C), Min:98.1 °F (36.7 °C), Max:98.8 °F (37.1 °C)          Neurological examination:    Mental status   Alert and oriented x 3; following all commands; speech is fluent, no dysarthria, aphasia. Cranial nerves   II -very limited vision due to glaucoma  III, IV, VI - extraocular muscles intact  V - normal facial sensation                                                               VII - normal facial symmetry                                                             VIII - intact hearing                                                                             IX, X -normal phonate                                               XI -normal head turn                                                     XII - midline tongue      Motor function   very thinly built but without focal weakness              Sensory function Intact to touch, vibration throughout. No diabetic peripheral neuropathy noted     Cerebellar  no cogwheeling rigidity tremor or bradykinesia     Reflex function  trace/4 symmetric throughout . Downgoing plantar response bilaterally. Gait                   not tested at this time             Diagnostics:      Laboratory Testing:  CBC:   Recent Labs     22  0553   WBC 8.7   HGB 12.6*        BMP:    Recent Labs     22  0553      K 3.3*      CO2 22   BUN 16   CREATININE 0.65*   GLUCOSE 110*         Lab Results   Component Value Date    CHOL 193 10/30/2021    LDLCHOLESTEROL 97 10/30/2021    HDL 80 10/30/2021    TRIG 78 10/30/2021    ALT 13 2022    AST 17 2022    TSH 1.69 10/30/2021    INR 1.0 2022    LABA1C 6.5 2021    LABMICR 48 (H) 10/30/2021       No results found for: PHENYTOIN, PHENYTOIN, VALPROATE, CBMZ      Impression:      1. Acute encephalopathy improved. Likely due to UTI. 2.  CT performed during this admission demonstrated cerebral volume loss and mild microvascular disease.   No acute changes    Plan:      No new action per neurology at this time. [facility] will sign off at this time.   Please call if there are any further changes        Electronically signed by Jayashree Hernandez MD on 4/11/2022 at 8:46 AM      Jayashree Hernandez MD  [facility]  Neurology

## 2022-04-11 NOTE — CARE COORDINATION
Social Work-Annabella did an onsite today and will need to do another onsite tomorrow since one on one was dc today.  Yoan Crews

## 2022-04-11 NOTE — RT PROTOCOL NOTE
RT Inhaler-Nebulizer Bronchodilator Protocol Note    There is a bronchodilator order in the chart from a provider indicating to follow the RT Bronchodilator Protocol and there is an Initiate RT Inhaler-Nebulizer Bronchodilator Protocol order as well (see protocol at bottom of note). CXR Findings:  XR CHEST PORTABLE    Result Date: 4/10/2022  1. Bibasilar airspace opacities potentially due to atelectasis, scarring, aspiration, and/or pneumonia. 2. Possible trace right pleural effusion. The findings from the last RT Protocol Assessment were as follows:   History Pulmonary Disease: Smoker 15 pack years or more  Respiratory Pattern: Regular pattern and RR 12-20 bpm  Breath Sounds: Slightly diminished and/or crackles  Cough: Strong, spontaneous, non-productive  Indication for Bronchodilator Therapy: Decreased or absent breath sounds  Bronchodilator Assessment Score: 3    Aerosolized bronchodilator medication orders have been revised according to the RT Inhaler-Nebulizer Bronchodilator Protocol below. Respiratory Therapist to perform RT Therapy Protocol Assessment initially then follow the protocol. Repeat RT Therapy Protocol Assessment PRN for score 0-3 or on second treatment, BID, and PRN for scores above 3. No Indications - adjust the frequency to every 6 hours PRN wheezing or bronchospasm, if no treatments needed after 48 hours then discontinue using Per Protocol order mode. If indication present, adjust the RT bronchodilator orders based on the Bronchodilator Assessment Score as indicated below. Use Inhaler orders unless patient has one or more of the following: on home nebulizer, not able to hold breath for 10 seconds, is not alert and oriented, cannot activate and use MDI correctly, or respiratory rate 25 breaths per minute or more, then use the equivalent nebulizer order(s) with same Frequency and PRN reasons based on the score.   If a patient is on this medication at home then do not decrease Frequency below that used at home. 0-3 - enter or revise RT bronchodilator order(s) to equivalent RT Bronchodilator order with Frequency of every 4 hours PRN for wheezing or increased work of breathing using Per Protocol order mode. 4-6 - enter or revise RT Bronchodilator order(s) to two equivalent RT bronchodilator orders with one order with BID Frequency and one order with Frequency of every 4 hours PRN wheezing or increased work of breathing using Per Protocol order mode. 7-10 - enter or revise RT Bronchodilator order(s) to two equivalent RT bronchodilator orders with one order with TID Frequency and one order with Frequency of every 4 hours PRN wheezing or increased work of breathing using Per Protocol order mode. 11-13 - enter or revise RT Bronchodilator order(s) to one equivalent RT bronchodilator order with QID Frequency and an Albuterol order with Frequency of every 4 hours PRN wheezing or increased work of breathing using Per Protocol order mode. Greater than 13 - enter or revise RT Bronchodilator order(s) to one equivalent RT bronchodilator order with every 4 hours Frequency and an Albuterol order with Frequency of every 2 hours PRN wheezing or increased work of breathing using Per Protocol order mode. RT to enter RT Home Evaluation for COPD & MDI Assessment order using Per Protocol order mode.     Electronically signed by Yady Stevenson RCP on 4/11/2022 at 9:53 AM

## 2022-04-11 NOTE — PROGRESS NOTES
[facility]   Urology Progress Note            Subjective:Patient seen on behalf of Dr. David Russo   Follow-up urinary retention gross hematuria    Patient Vitals for the past 24 hrs:   BP Temp Temp src Pulse Resp SpO2   04/11/22 0733 124/68 98.2 °F (36.8 °C) Oral 65 16 92 %   04/11/22 0603 -- -- -- -- -- 95 %   04/11/22 0014 -- -- -- -- -- 94 %   04/11/22 0001 -- -- -- -- -- 91 %   04/10/22 1910 123/64 98.1 °F (36.7 °C) Oral 67 19 91 %   04/10/22 1646 (!) 91/51 98.2 °F (36.8 °C) Oral 70 20 94 %   04/10/22 1206 (!) 109/55 98.8 °F (37.1 °C) Oral 69 18 94 %   04/10/22 0914 (!) 105/57 98.1 °F (36.7 °C) Oral 80 20 92 %       Intake/Output Summary (Last 24 hours) at 4/11/2022 0800  Last data filed at 4/11/2022 0554  Gross per 24 hour   Intake 515.31 ml   Output 1200 ml   Net -684.69 ml       Recent Labs     04/09/22  0553   WBC 8.7   HGB 12.6*   HCT 39.3*   MCV 94.7        Recent Labs     04/09/22  0553      K 3.3*      CO2 22   BUN 16   CREATININE 0.65*       No results for input(s): COLORU, PHUR, LABCAST, WBCUA, RBCUA, MUCUS, TRICHOMONAS, YEAST, BACTERIA, CLARITYU, SPECGRAV, LEUKOCYTESUR, UROBILINOGEN, Ann Marie Hutch in the last 72 hours.     Invalid input(s): NITRATE, GLUCOSEUKETONESUAMORPHOUS    Additional Lab/culture results:    Physical Exam: Patient with chronic indwelling Worley, hematuria traumatic in nature associated with catheter change, patient still confused    Interval Imaging Findings:    Impression: Gross hematuria indwelling Worley  Patient Active Problem List   Diagnosis    Type 2 diabetes mellitus, without long-term current use of insulin ([address]    Essential hypertension    Advanced open-angle glaucoma    Benign non-nodular prostatic hyperplasia with lower urinary tract symptoms    Dry eyes, bilateral    Elevated PSA    Blindness of both eyes    Closed right hip fracture (HCC)    UTI (urinary tract infection)    Moderate protein-calorie malnutrition ([facility] [address]    Toxic metabolic encephalopathy    Tobacco abuse    Acute respiratory failure with hypoxia ([facility] [address]       Plan: Patient's renal function is at baseline, we will continue to monitor    Governor MD Diana  8:00 AM 4/11/2022

## 2022-04-11 NOTE — CONSULTS
Pulmonary Medicine and Critical Care Consult    Patient - Lizandro Weeks   MRN -  [id number]   [facility] # - [de-identified]   - 1940      Date of Admission -  2022  2:52 AM  Date of evaluation -  2022  Room -    Hospital Day - 3  Consulting - Genie Dakin, MD Primary Care Physician - Kay Meyer DO     Reason for Consult      Respiratory insufficiency/hypoxia  Assessment   · Acute hypoxic respiratory insufficiency  · Acute Exacerbation of COPD/emphysema  · atelectasis/basilar consolidation possible aspiration  · Active smoker  · Elevated D-dimer  · UTI/indwelling Worley  · Encephalopathy improved  ·  legally blind  · Compression fracture at T5      Recommendations   · Oxygen by nasal cannula  · Home O2 evaluation before discharge ABG  · ABG  · DuoNeb by nebulizer  · Pulmicort 0.5 every 12 hours  · Solu-Medrol 40 IV every 8 hours  · Rocephin IV   · Lower extremity venous Dopplers  · Check BNP repeat chest x-ray  · Speech swallow evaluation  · Neurology on consult  · Urology on consult  · Consider MRI thoracic spine  · Smoking cessation advised/nicotine patch  · DVT prophylaxis    Problem List      Patient Active Problem List   Diagnosis    Type 2 diabetes mellitus, without long-term current use of insulin (HCC)    Essential hypertension    Advanced open-angle glaucoma    Benign non-nodular prostatic hyperplasia with lower urinary tract symptoms    Dry eyes, bilateral    Elevated PSA    Blindness of both eyes    Closed right hip fracture (HCC)    UTI (urinary tract infection)    Moderate protein-calorie malnutrition (HCC)    Toxic metabolic encephalopathy    Tobacco abuse    Acute respiratory failure with hypoxia ([address]       HPI     Lizandro Weeks is [de-identified] y.o.,  male, previous medical history of legal blindness, indwelling Worley catheter for urinary retention with BPH, hypertension diabetes low,. He presented to the hospital for change in mental status.   He was diagnosed with a urinary tract infection. He was started on antibiotics. He was hypoxic requiring oxygen at 4 L nasal cannula. He had improved mental status since admission. He admits having worsening shortness of breath over time gradually. He has cough with occasional sputum production. He has been smoking up to 2 packs a day but cut down to half pack a day lately. He has been staying with his daughter and son-in-law. He was agitated on presentation and has received Ativan. He reports feeling depressed after he lost his best friend of 48 years who  in a nursing home. CTA chest was done.     PMHx   Past Medical History      Diagnosis Date    Hip fracture [facility])       Past Surgical History        Procedure Laterality Date    EYE SURGERY         Meds    Current Medications    sodium chloride  100 mL IntraVENous Once    fluticasone  2 spray Each Nostril Daily    cefTRIAXone (ROCEPHIN) IV  1,000 mg IntraVENous Q24H    lidocaine  5 mL Topical Once    latanoprost  1 drop Right Eye Nightly    tamsulosin  0.8 mg Oral Daily    metFORMIN  850 mg Oral BID WC    Vitamin D  2,000 Units Oral Daily    sertraline  25 mg Oral Daily    sodium chloride flush  5-40 mL IntraVENous 2 times per day    enoxaparin  40 mg SubCUTAneous Daily    nicotine  1 patch TransDERmal Daily    finasteride  5 mg Oral Daily     ipratropium-albuterol, sodium chloride flush, QUEtiapine, potassium chloride **OR** potassium alternative oral replacement **OR** potassium chloride, sodium chloride flush, sodium chloride, ondansetron **OR** ondansetron, acetaminophen **OR** acetaminophen, polyethylene glycol, oxyCODONE-acetaminophen  IV Drips/Infusions   sodium chloride Stopped (04/10/22 172)     Home Medications  Medications Prior to Admission: finasteride (PROSCAR) 5 MG tablet, Take 5 mg by mouth daily  tamsulosin (FLOMAX) 0.4 MG capsule, Take 0.4 mg by mouth at bedtime  calcium citrate (CALCITRATE) 950 (200 Ca) MG tablet, Take 400 mg by mouth 2 times daily   acetaminophen (TYLENOL) 500 MG tablet, Take 1,000 mg by mouth every 6 hours as needed  Cholecalciferol 50 MCG (2000 UT) TABS, Take 1 tablet by mouth daily  sertraline (ZOLOFT) 25 MG tablet, Take 1 tablet by mouth daily  metFORMIN (GLUCOPHAGE) 850 MG tablet, Take 1 tablet by mouth 2 times daily (with meals)  [DISCONTINUED] tamsulosin (FLOMAX) 0.4 MG capsule, Take 2 capsules by mouth daily (Patient taking differently: Take 0.4 mg by mouth daily )  latanoprost (XALATAN) 0.005 % ophthalmic solution, Administer 1 drop to the right eye nightly. Allergies    Patient has no known allergies. Social History     Social History     Socioeconomic History    Marital status:      Spouse name: Not on file    Number of children: Not on file    Years of education: Not on file    Highest education level: Not on file   Occupational History    Not on file   Tobacco Use    Smoking status: Current Every Day Smoker     Packs/day: 1.00     Years: 60.00     Pack years: 60.00     Types: Cigarettes    Smokeless tobacco: Never Used   Substance and Sexual Activity    Alcohol use: No    Drug use: Yes     Types: Marijuana Norval Remak)    Sexual activity: Not on file   Other Topics Concern    Not on file   Social History Narrative    Not on file     Social Determinants of Health     Financial Resource Strain: Low Risk     Difficulty of Paying Living Expenses: Not hard at all   Food Insecurity: No Food Insecurity    Worried About Running Out of Food in the Last Year: Never true    Bright of Food in the Last Year: Never true   Transportation Needs:     Lack of Transportation (Medical): Not on file    Lack of Transportation (Non-Medical):  Not on file   Physical Activity:     Days of Exercise per Week: Not on file    Minutes of Exercise per Session: Not on file   Stress:     Feeling of Stress : Not on file   Social Connections:     Frequency of Communication with Friends and Family: Not on file    Frequency of Social Gatherings with Friends and Family: Not on file    Attends Denominational Services: Not on file    Active Member of Clubs or Organizations: Not on file    Attends Club or Organization Meetings: Not on file    Marital Status: Not on file   Intimate Partner Violence:     Fear of Current or Ex-Partner: Not on file    Emotionally Abused: Not on file    Physically Abused: Not on file    Sexually Abused: Not on file   Housing Stability:     Unable to Pay for Housing in the Last Year: Not on file    Number of [place] in the Last Year: Not on file    Unstable Housing in the Last Year: Not on file     Family History          Problem Relation Age of Onset    High Blood Pressure Mother     Heart Disease Mother     Kidney Disease Mother     Other Sister         obesity     ROS - 11 systems     Attempted poor historian  Vitals     height is 5' 11\" (1.803 m) and weight is 131 lb 8 oz (59.6 kg). His oral temperature is 98.2 °F (36.8 °C). His blood pressure is 124/68 and his pulse is 65. His respiration is 16 and oxygen saturation is 92%. Body mass index is 18.34 kg/m². I/O        Intake/Output Summary (Last 24 hours) at 4/11/2022 1455  Last data filed at 4/11/2022 1207  Gross per 24 hour   Intake 8.27 ml   Output 1800 ml   Net -1791.73 ml     I/O last 3 completed shifts: In: 1644.5 [I.V.:1617.7; IV Piggyback:26.7]  Out: 2400 [Urine:2400]   Patient Vitals for the past 96 hrs (Last 3 readings):   Weight   04/09/22 2351 131 lb 8 oz (59.6 kg)   04/09/22 0415 131 lb 3.2 oz (59.5 kg)   04/08/22 0622 139 lb 12.8 oz (63.4 kg)     Exam   General Appearance  Awake, alert, oriented, in no acute distress  HEENT - Head is normocephalic, atraumatic.  Pupil reactive to light legally blind glasses on  Neck - Supple, symmetrical, trachea midline and Soft, trachea midline and straight  Lungs -   decreased breath sounds no crackles or wheeze  Cardiovascular - Heart sounds are normal.  Regular rhythm normal rate without murmur, gallop or rub. Abdomen - Soft, nontender, nondistended, no masses or organomegaly  Neurologic - CN II-XII are grossly intact. There are no focal motor deficits  Skin - No bruising or bleeding  Extremities - No cyanosis, clubbing or edema    Labs  - Old records and notes have been reviewed in [facility]   CBC     Lab Results   Component Value Date    WBC 5.8 04/11/2022    RBC 4.11 04/11/2022    HGB 12.4 04/11/2022    HCT 38.5 04/11/2022     04/11/2022    MCV 93.7 04/11/2022    MCH 30.2 04/11/2022    MCHC 32.2 04/11/2022    RDW 14.5 04/11/2022    LYMPHOPCT 17 04/11/2022    MONOPCT 10 04/11/2022    BASOPCT 0 04/11/2022    MONOSABS 0.58 04/11/2022    LYMPHSABS 0.99 04/11/2022    EOSABS 0.06 04/11/2022    BASOSABS 0.00 04/11/2022    DIFFTYPE NOT REPORTED 10/30/2021     BMP   Lab Results   Component Value Date     04/11/2022    K 3.8 04/11/2022     04/11/2022    CO2 25 04/11/2022    BUN 19 04/11/2022    CREATININE 0.67 04/11/2022    GLUCOSE 82 04/11/2022    CALCIUM 8.6 04/11/2022    MG 1.7 04/09/2022     LFTS  Lab Results   Component Value Date    ALKPHOS 84 04/08/2022    ALT 13 04/08/2022    AST 17 04/08/2022    PROT 6.5 04/08/2022    BILITOT 0.43 04/08/2022    LABALBU 3.8 04/08/2022       Lab Results   Component Value Date    APTT 35.0 (H) 04/08/2022     INR   Lab Results   Component Value Date    INR 1.0 04/08/2022    PROTIME 13.4 04/08/2022     Results for Eugenio Becerra (MRN [id number]) as of 4/11/2022 17:03   Ref. Range 4/11/2022 08:32   D-Dimer, Quant Latest Ref Range: 0.00 - 0.59 mg/L FEU 1.26 (H)       Component 4/8/22 0420   Specimen Description . CLEAN CATCH URINE    Culture SERRATIA MARCESCENS >[id number] CFU/ML Abnormal     Resulting Agency [facility]      Serratia marcescens (1)    Antibiotic Interpretation Microscan Method Status    aztreonam Sensitive <=1 BACTERIAL SUSCEPTIBILITY PANEL NIKIA Final    cefTRIAXone Sensitive <=1 BACTERIAL SUSCEPTIBILITY PANEL NIKIA Final    ciprofloxacin Sensitive <=0.25 BACTERIAL SUSCEPTIBILITY PANEL NIKIA Final    gentamicin Sensitive <=1 BACTERIAL SUSCEPTIBILITY PANEL NIKIA Final    nitrofurantoin Resistant 256 BACTERIAL SUSCEPTIBILITY PANEL NIKIA Final    tobramycin Sensitive 2 BACTERIAL SUSCEPTIBILITY PANEL NIKIA Final    trimethoprim-sulfamethoxazole Sensitive <=20 BACTERIAL SUSCEPTIBILITY PANEL NIKIA Final          Specimen Collected: 04/08/22 04:20 Last Resulted: 04/10/22 09:10             Radiology    CXR      CTA chest  1. Age-indeterminate compression deformity of T5.  Correlate for back pain   and point tenderness at this level.  Further evaluation with MRI is   recommended assuming there are no contraindications to MRI. 2. No evidence for central pulmonary embolus.  Evaluation of the lobar   pulmonary arterial vasculature and beyond is limited due to factors detailed   above. 3. Mild cardiomegaly.  Coronary artery disease.  Atherosclerotic   calcification of the aorta. 4. Mild-to-moderate emphysema.  Bilateral lower lobe airspace consolidation   and respiratory motion, atelectasis and/or infiltrate.  Follow-up is   recommended to document resolution. 5. Bilateral partially visualized renal cysts.  Confirmation with renal   ultrasound recommended.  Bilateral punctate renal vascular calcifications   and/or nonobstructing punctate renal calculi. (See actual reports for details)    \"Thank you for asking us to see this patient\"    Case discussed with nurse and patient/family. Questions and concerns addressed.     Electronically signed by     Frank Garza MD on 4/11/2022 at 2:55 PM

## 2022-04-11 NOTE — PROGRESS NOTES
Physical Therapy  Facility/Department: [facility]  Daily Treatment Note  NAME: Joan Sales  : 1940  MRN: [id number]    Date of Service: 2022    Discharge Recommendations:  Patient would benefit from continued therapy after discharge      Due to recent hospitalization and medical condition, pt would benefit from additional therapy at time of discharge to ensure safety. Please refer to the AM-PAC score for current functional status. Assessment   Body structures, Functions, Activity limitations: Decreased functional mobility ; Decreased endurance;Decreased strength;Decreased balance;Decreased posture  Assessment: Patient requries a MOD x2 with RW for ambulation due to decreased balance and stability and visual deficits. Patient would benefit from continued skilled PT treatment to maximize functional mobility and return to PLOF. PT Education: PT Role;Plan of Care;General Safety; Low Vision Education  Activity Tolerance  Activity Tolerance: Patient limited by cognitive status; Patient limited by endurance; Patient limited by fatigue     Patient Diagnosis(es): The primary encounter diagnosis was Altered mental status, unspecified altered mental status type. A diagnosis of Acute cystitis with hematuria was also pertinent to this visit. has a past medical history of Hip fracture ([address] has a past surgical history that includes eye surgery. 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   Family / Caregiver Present: No  Subjective  Subjective: Patient agreeable to PT treatment  General Comment  Comments: OK for PT per Xena RN          Orientation  Orientation  Overall Orientation Status: Impaired  Cognition      Objective   Bed mobility  Rolling to Left: Minimal assistance  Rolling to Right: Minimal assistance  Supine to Sit: Minimal assistance  Scooting: Contact guard assistance  Comment: Patient requires MOD verbal cues for hand placement and progression techniques to EOB seated balance. Patient requires increased time/effort to promote to EOB seated posture. Upon reaching EOB seated posture patient works on core control and support working across [employer] on therapeutic activities maximizing balance and stability righting techniques. Transfers  Sit to Stand: Moderate Assistance;2 Person Assistance  Stand to sit: Moderate Assistance;2 Person Assistance  Bed to Chair: Moderate assistance;2 Person Assistance  Comment: Patient requires a MOD x2 with RW for increased support and stability in transfer activities. Patient requires MOD encouragement to maximize endurance this treatment. Patient with decreased vision and requies increased assist with RW progression techniques. Ambulation  Ambulation?: Yes  Ambulation 1  Surface: level tile  Device: Rolling Walker  Assistance: Minimal assistance;2 Person assistance  Gait Deviations: Slow Tori;Decreased step length  Distance: 15' x 1  Comments: to recliner, limited by poor vision, therapist assist with RW advancement and patient balance and support. Slow careful progression  Stairs/Curb  Stairs?: No  Neuromuscular Education  NDT Treatment: Gait ;Lower extremity; Sitting;Standing  Balance  Single Leg Stance R Le  Single Leg Stance L Le  Comments: balance assessed with RW  Exercises  Gluteal Sets: 10 x1  Hip Flexion: 10 x1  Hip Abduction: 10 x1  Knee Long Arc Quad: 10 x1  Ankle Pumps: 10 x1  Comments: Patient performs CANDIE LE AROM to maintain proper joint mechanics and fluid exchange while promoting tolerance and endurance to activities.   Patient educated on the importance of OOB activities to prevent secondary complications and promote skin integrity         Comment: Patient assisted with gown change       AM-PAC Score  AM-PAC Inpatient Mobility Raw Score : 16 (04/11/22 1306)  AM-PAC Inpatient T-Scale Score : 40.78 (04/11/22 1306)  Mobility Inpatient CMS 0-100% Score: 54.16 (04/11/22 1306)  Mobility Inpatient CMS G-Code Modifier : CK (04/11/22 1306)          Goals  Short term goals  Time Frame for Short term goals: 12 treatments  Short term goal 1: Independent transfers  Short term goal 2: SBA ambulation w/ ' x 1/ CGA ascending/descending 7 steps w/ B HR  Short term goal 3: Good standing balance and posture  Short term goal 4: Tolerate 30 min ther act  Short term goal 5: 5/5 B LE strength  Patient Goals   Patient goals : Home to my own apt. Plan    Plan  Times per week: 1-2x/day,5-6 days/week  Current Treatment Recommendations: Strengthening,Transfer Training,Balance Training,Endurance Training,Gait Training,Stair training  Safety Devices  Type of devices: Nurse notified,Left in chair,Gait belt,Call light within reach,Chair alarm in place,All fall risk precautions in place  Restraints  Initially in place: No     Therapy Time   Individual Concurrent Group Co-treatment   Time In 0850         Time Out 0928         Minutes 45              Co-treatment with OT warranted secondary to decreased safety and independence requiring 2 skilled therapy professionals to address individual discipline's goals. PT addressing pre gait trunk strengthening, weight shifting prior to transfers, transfer training and postural control in sitting.     Telly Sawyer, PTA

## 2022-04-11 NOTE — PLAN OF CARE
related to the disease process, condition or treatment will be avoided or minimized  4/11/2022 1101 by Tomasz Paniagua RN  Outcome: Ongoing     Problem: Coping:  Goal: Ability to remain calm will improve  Description: Ability to remain calm will improve  4/11/2022 1101 by Tomasz Paniagua RN  Outcome: Ongoing     Problem: Safety:  Goal: Ability to remain free from injury will improve  Description: Ability to remain free from injury will improve  4/11/2022 1101 by Tomasz Paniagua RN  Outcome: Ongoing     Problem: Self-Care:  Goal: Ability to participate in self-care as condition permits will improve  Description: Ability to participate in self-care as condition permits will improve  4/11/2022 1101 by Tomasz Paniagua RN  Outcome: Ongoing

## 2022-04-11 NOTE — PROCEDURES
INSTRUMENTAL SWALLOW REPORT  MODIFIED BARIUM SWALLOW    NAME: Joan Sales   : 1940  MRN: [id number]       Date of Eval: 2022              Referring Diagnosis(es):      Past Medical History:  has a past medical history of Hip fracture ([address] Past Surgical History:  has a past surgical history that includes eye surgery. Current Diet Solid Consistency: NPO  Current Diet Liquid Consistency: NPO       Type of Study: Initial MBS       Patient Complaints/Reason for Referral:  Joan Sales was referred for a MBS to assess the efficiency of his/her swallow function, assess for aspiration, and to make recommendations regarding safe dietary consistencies, effective compensatory strategies, and safe eating environment. Onset of problem: Joan Sales is a [de-identified] y.o. Non- / non  male who presents with Altered Mental Status and Urinary Tract Infection   and is admitted to the hospital for the management of UTI (urinary tract infection).     63-year-old male with known medical history of chronic urinary retention, on chronic Worley catheter presents to the hospital for altered mental status. As per family patient has been acting more confused for last 2 days. Patient gets his Worley changed every month. Per chart review patient had cloudy urine and was acting very confused. On my evaluation patient is oriented to place and self but not very cooperative in my history and exam.  States that he has pain in the scrotal area. Has chronic Worley in place. Behavior/Cognition/Vision/Hearing:  Behavior/Cognition: Alert; Cooperative but confused  Vision: Impaired  Vision Exceptions: Wears glasses at all times  Hearing: Within functional limits    Impressions:  Patient presents with safe swallow for Regular diet with Moderately Thick (Honey) liquids as evidenced by no penetration or aspiration with these consistencies when tested. +deep penetration with nectar, not cleared. +deep pen to cords with thin liquid, not cleared, no cough. +premature spill with liquids. Recommend small sips and bites, only feed when alert and awake and upright at 90 degrees for all PO intake. Recommend close monitoring for overt/clinical s/s of aspiration and D/C PO intake and complete Modified Barium Swallow Study should they occur. Results and recommendations reported to RN. Patient Position: Lateral and Patient Degrees: 90      Consistencies Administered: All                 Dysphagia Outcome Severity Scale: Level 4: Mild moderate dysphagia- Intermittent supervision/cueing. One - two diet consistencies restricted  Penetration-Aspiration Scale (PAS): 5 - Material enters the airway, contacts the vocal folds, and is not ejected into the airway    Recommended Diet:  Solid consistency: Regular  Liquid consistency: Moderately Thick (Honey)  Liquid administration via: Cup    Medication administration: PO    Safe Swallow Protocol:     Compensatory Swallowing Strategies: Alternate solids and liquids;Eat/Feed slowly;Upright as possible for all oral intake;Small bites/sips    Recommendations/Treatment  Requires SLP Intervention: Yes        D/C Recommendations: To be determined         Recommended Exercises:    Therapeutic Interventions: Diet tolerance monitoring;Oral motor exercises;Oral care; Bolus control exercises; Tongue base strengthening;Patient/Family education;Pharyngeal exercises;Effortful swallow; Laryngeal exercises; Carolyn         Education: Images and recommendations were reviewed with pt, RN following this exam.   Patient Education: yes  Patient Education Response: Verbalizes understanding    Prognosis  Prognosis for safe diet advancement: fair  Duration/Frequency of Treatment  Duration/Frequency of Treatment: 3-5x/week      Goals:    Long Term: To Maximize safety with intake, optimize nutrition/hydration and minimize risk for aspiration. Repeat MBSS Recommended in     5-7     Days. Short Term:     Goal 1: Pt will tolerate recommended diet without overt s/s of aspiration. Goal 2: Pt will complete OMEX for dysphagia x10-20/session                       Oral Preparation / Oral Phase  Oral Phase: WFL  Oral Phase: +premature spill with soft and regular solid as well as honey, nectar, thin liquids      Pharyngeal Phase  Pharyngeal Phase: Impaired  Pharyngeal: Puree: No penetration, no aspiration x2. Min vallecula residue, cleared. Soft solid: No penetration, no aspiration. Reg solid: No penetration, no aspiration. Honey cup: +flash penetration, no aspiration x2. Nectar by straw: +flash pen, no aspiration. Nectar cup: +deep penetration, no aspiration, no cough. Thin cup: +deep penetration to cords, no cough.     Esophageal Phase  Esophageal Screen: WFL        Pain   Patient Currently in Pain: No  Pain Level: 0  Pain Type: Acute pain  Pain Location: Groin      Therapy Time:   Individual Concurrent Group Co-treatment   Time In 1420         Time Out 1440         Minutes [id number] [address] [place], [place], 4/11/2022, 4:27 PM

## 2022-04-11 NOTE — PROGRESS NOTES
[facility]  Office: [address], DO, Zakiya Humphrey, DO, Andre Rasmussen, DO, Leeann Fang Blood, DO, Joann Pathak MD, Gilmar Diaz MD, Dakotah Turk MD, Cole Perez MD, Beryle Marseille, MD, Ciro Pallas, MD, Toy Yo MD, Pernell Flight, DO, Veronica Heredia, DO, Byron Cruz MD,  Fredrick Lew, DO, Charanjit Medley MD, Sofia Huynh MD, Jasmyne Evans MD, Saeid Kidd, DO, Dominique Mary MD, Marie Guzmán MD, Vidya Ruano, [facility], [facility], [facility], Hermelinda Moreno, CNP, Bossman Goodson, CNP, Jennifer Woods, CNP, Hubert Al, CNP, Severino Andrea, CNP, JOSE AndersonC, Mortimer Guardian, [facility], Mauricio Mcpherson, CNP, Joselin Ricks, CNP, Pratibha Karimi, CNP, Chavo Alba, CNS, Lavonne Frankel, [facility], Miriam Brooks, CNP, Marlon Harden, CNP, Satya Quigley, [facility]    Progress Note    4/11/2022    2:34 PM    Name:   Oletha Curling  MRN:     [id number]     [place]:      [de-identified]   Room:   2009/2009-01   Day:  3  Admit Date:  4/8/2022  2:52 AM    PCP:   Noe Breaux DO  Code Status:  Full Code    Subjective:     C/C:   Chief Complaint   Patient presents with    Altered Mental Status    Urinary Tract Infection     Interval History Status:worse    Patient is more awake and alert today  Is able to tell me the year and president's name. States that he went to [place] last week. He does get intermittently confused. Main concern is hypoxia, patient has been desaturating and now is requiring around 4 to 5 L of nasal cannula oxygen. His oxygen requirements are new. Chest x-ray did not show any major abnormality  CT PE scan was ordered to rule out pulmonary embolism. Blood pressure has been stable. No white count, hemoglobin 12.4, creatinine 0.67, glucose 82.   Brief History:     47-year-old male with known medical history of chronic urinary retention, on chronic Worley catheter presents to the hospital for altered mental status. As per family patient has been acting more confused for last 2 days. Patient gets his Worley changed every month. Per chart review patient had cloudy urine and was acting very confused. On my evaluation patient is oriented to place and self but not very cooperative in my history and exam.  States that he has pain in the scrotal area. Has chronic Worley in place. Review of Systems:   Patient is altered, review of system not possible    Medications: Allergies:  No Known Allergies    Current Meds:   Scheduled Meds:    sodium chloride  100 mL IntraVENous Once    fluticasone  2 spray Each Nostril Daily    cefTRIAXone (ROCEPHIN) IV  1,000 mg IntraVENous Q24H    lidocaine  5 mL Topical Once    latanoprost  1 drop Right Eye Nightly    tamsulosin  0.8 mg Oral Daily    metFORMIN  850 mg Oral BID WC    Vitamin D  2,000 Units Oral Daily    sertraline  25 mg Oral Daily    sodium chloride flush  5-40 mL IntraVENous 2 times per day    enoxaparin  40 mg SubCUTAneous Daily    nicotine  1 patch TransDERmal Daily    finasteride  5 mg Oral Daily     Continuous Infusions:    sodium chloride Stopped (04/10/22 4109)     PRN Meds: ipratropium-albuterol, sodium chloride flush, QUEtiapine, potassium chloride **OR** potassium alternative oral replacement **OR** potassium chloride, sodium chloride flush, sodium chloride, ondansetron **OR** ondansetron, acetaminophen **OR** acetaminophen, polyethylene glycol, oxyCODONE-acetaminophen    Data:     Past Medical History:   has a past medical history of Hip fracture ([facility] [address] Social History:   reports that he has been smoking cigarettes. He has a 60.00 pack-year smoking history. He has never used smokeless tobacco. He reports current drug use. Drug: Marijuana Yael Dinning). He reports that he does not drink alcohol.      Family History:   Family History   Problem Relation Age of Onset    High Blood Pressure Mother     Heart Disease Mother     Kidney Disease Mother  Other Sister         obesity       Vitals:  /68   Pulse 65   Temp 98.2 °F (36.8 °C) (Oral)   Resp 16   Ht 5' 11\" (1.803 m)   Wt 131 lb 8 oz (59.6 kg)   SpO2 92%   BMI 18.34 kg/m²   Temp (24hrs), Av.2 °F (36.8 °C), Min:98.1 °F (36.7 °C), Max:98.2 °F (36.8 °C)    Recent Labs     04/10/22  1541 04/10/22  2011 04/11/22  0559 22  1131   POCGLU 98 92 85 86       I/O (24Hr): Intake/Output Summary (Last 24 hours) at 2022 1434  Last data filed at 2022 1207  Gross per 24 hour   Intake 8.27 ml   Output 1800 ml   Net -1791.73 ml       Labs:  Hematology:  Recent Labs     22  0553 22  0832   WBC 8.7 5.8   RBC 4.15* 4.11*   HGB 12.6* 12.4*   HCT 39.3* 38.5*   MCV 94.7 93.7   MCH 30.4 30.2   MCHC 32.1 32.2   RDW 14.5* 14.5*    202   MPV 9.9 9.8   DDIMER  --  1.26*     Chemistry:  Recent Labs     22  0553 22  0832    138   K 3.3* 3.8    104   CO2 22 25   GLUCOSE 110* 82   BUN 16 19   CREATININE 0.65* 0.67*   MG 1.7  --    ANIONGAP 11 9   LABGLOM >60 >60   GFRAA >60 >60   CALCIUM 7.6* 8.6     Recent Labs     22  1441 04/10/22  1146 04/10/22  1541 04/10/22  2011 04/11/22  0559 22  1131   POCGLU 194* 102 98 92 85 86     ABG:No results found for: POCPH, PHART, PH, POCPCO2, FWO0BSD, PCO2, POCPO2, PO2ART, PO2, POCHCO3, BTT5NJI, HCO3, NBEA, PBEA, BEART, BE, THGBART, THB, MMR7YWJ, RHAU5XCZ, V9UIOGYX, O2SAT, FIO2  Lab Results   Component Value Date/Time    SPECIAL R FA 8ML 2022 04:20 AM     Lab Results   Component Value Date/Time    CULTURE NO GROWTH 3 DAYS 2022 04:20 AM    CULTURE SERRATIA MARCESCENS >101175 CFU/ML (A) 2022 04:20 AM       Radiology:  CT HEAD WO CONTRAST    Result Date: 2022  1. No acute intracranial abnormality. 2. Mild-to-moderate global parenchymal volume loss with chronic microvascular ischemic changes. 3. Scattered atherosclerosis of the intracranial vasculature.        Physical Examination:        General appearance: confused, awake, alert  Mental Status: Not oriented to place, oriented to self, time, could tell me the president's name  Lungs:  bilateral crackles at bases, on 4lt nc oxygen  Heart:  regular rate and rhythm, no murmur  Abdomen:  soft, nontender, nondistended, normal bowel sounds, no masses, hepatomegaly, splenomegaly, modi in place  Extremities:  no edema, redness, tenderness in the calves  Skin:  no gross lesions, rashes, induration    Assessment:        Hospital Problems           Last Modified POA    * (Principal) UTI (urinary tract infection) 4/8/2022 Yes    Type 2 diabetes mellitus, without long-term current use of insulin ([address] 4/8/2022 Yes    Essential hypertension 4/8/2022 Yes    Advanced open-angle glaucoma 4/8/2022 Yes    Benign non-nodular prostatic hyperplasia with lower urinary tract symptoms 4/8/2022 Yes    Overview Signed 8/27/2021  1:08 PM by Blanca Pinon,      Formatting of this note might be different from the original.  luts - discussed options. He has cut down some on caffeine but not completely. His symptoms are somewhat improved. He is not bothered by them at this point and because of this does not want any further intervention. Moderate protein-calorie malnutrition ([address] 4/8/2022 Yes    Toxic metabolic encephalopathy 4/4/6179 Yes    Tobacco abuse 4/8/2022 Yes    Acute respiratory failure with hypoxia ([address] 4/10/2022 Yes          Plan:        Acute respiratory failure-chest x-ray showed bibasilar airspace opacities, D-dimer was elevated, CT PE scan was done which did not show any pulmonary embolism. Covid negative. Breathing treatments with DuoNeb added. Will consult pulmonology given rapid decline. Encourage incentive spirometry. Toxic metabolic encephalopathy with UTI-UTI likely secondary to chronic Modi,culture growing serratia, on rocephin,, blood cultures negative for now. Mentation improving. Neuro signed off.     Concern for aspiration- swallow study done, recommend regular diet with honey thick liquid    BPH-continue finasteride and Flomax, follows up with urology outpatient     Type 2 diabetes mellitus-on Metformin, continue in the hospital, POCT blood sugar checks at meals and at bedtime.     Hypertension- bp stable, avoid hypotension     Replace potassium    Nicotine patch for tobacco abuse     Sitter at bedside  Pt/ot idris Escobedo MD  4/11/2022  2:34 PM

## 2022-04-11 NOTE — PROGRESS NOTES
history that includes eye surgery. 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), Foely cath, easily agitated, up with assist, LUE IV, ALARMS, easily agitiated  Subjective   General  Chart Reviewed: Yes  Patient assessed for rehabilitation services?: Yes  Additional Pertinent Hx: Pt. agreeable for treatment  Response to previous treatment: Patient with no complaints from previous session  Family / Caregiver Present: No  Subjective  Subjective: \"Oh man . .......do we have to? \"  General Comment  Comments: Pt. supine in bed upon arrival to room      Orientation  Orientation  Overall Orientation Status: Within Functional Limits  Objective    ADL  Grooming: Setup;Stand by assistance;Verbal cueing  LE Dressing: Setup;Maximum assistance;Dependent/Total  Toileting: None  Additional Comments: Max verbal cues of orientation to task        Balance  Sitting Balance: Stand by assistance  Standing Balance: Moderate assistance  Standing Balance  Time: 3-4 min  Activity: ADL transfer  Comment: with use of walker  Functional Mobility  Functional - Mobility Device: Rolling Walker  Activity: Other (from EOB to bedside recliner)  Assist Level: Moderate assistance (min/mod assist x2 to ensure safety d/t orientation to task and management of lines.)  Toilet Transfers  Toilet Transfer: Unable to assess  Shower Transfers  Shower Transfers: Not tested  Bed mobility  Rolling to Right: Minimal assistance  Supine to Sit: Minimal assistance  Sit to Supine: Contact guard assistance  Scooting: Contact guard assistance  Comment: Pt. required mod verbal cues for orientation to task and pacing self to ensure safety. Max assist for line management  Transfers  Sit to stand: Minimal assistance;2 Person assistance; Moderate assistance  Stand to sit: Minimal assistance;2 Person assistance; Moderate assistance  Transfer Comments: Pt. completed functional transfer with min/mod assist x2 to ensure safety and follow proper tech with reaching back for sitting surface      Cognition  Overall Cognitive Status: Exceptions  Arousal/Alertness: Appropriate responses to stimuli  Following Commands: Follows multistep commands with repitition; Follows multistep commands with increased time  Attention Span: Appears intact  Memory: Appears intact  Safety Judgement: Decreased awareness of need for safety;Decreased awareness of need for assistance  Problem Solving: Decreased awareness of errors;Assistance required to identify errors made;Assistance required to correct errors made  Insights: Decreased awareness of deficits  Initiation: Requires cues for some  Sequencing: Requires cues for some     Perception  Overall Perceptual Status: WFL      Type of ROM/Therapeutic Exercise  Type of ROM/Therapeutic Exercise: AROM  Comment: Pt. completed two sets of AROM with resistive theraband to promote functional strength. Exercises  Shoulder Flexion: x15  Shoulder Extension: x15  Elbow Flexion: x15  Elbow Extension: x15  Other: Pt. required max verbal/tactile cues on proper tech. for each exercise. Plan   Plan  Times per week: 4-5x/wk 1x/day as katelynn  Times per day: Daily  Current Treatment Recommendations: [place] Training,Functional Mobility Training,Safety Education & Training,Pain Management,Home Management Training,Self-Care / ADL,Equipment Evaluation, Education, & procurement,Patient/Caregiver Education & Training  Plan Comment: Cont with stated POC       AM-PAC Score        AM-PAC Inpatient Daily Activity Raw Score: 12 (04/11/22 1229)  AM-PAC Inpatient ADL T-Scale Score : 30.6 (04/11/22 1229)  ADL Inpatient CMS 0-100% Score: 66.57 (04/11/22 1229)  ADL Inpatient CMS G-Code Modifier : CL (04/11/22 1229)    Goals  Short term goals  Time Frame for Short term goals:  By discharge, pt to demo  Short term goal 1: ADL transfers and functional mobility to SBA with use of AD as needed. Short term goal 2: increased B UE strength by 1/2 grade to assist with functional tasks/ I with simple B UE HEP. Short term goal 3: toileting to SBA with use of AD/grab bars /BSC as needed. Short term goal 4: UB ADLs to Set up and LB ADLs to Min A with use of AD/AE as needed. Short term goal 5: bed mobility to Mod I with use of bedrails as needed. Long term goals  Long term goal 1: Pt to be I with fall prevention education, EC/WS tech, recommendations for AE/discharge with use of handouts as needed. Patient Goals   Patient goals : To go home! Therapy Time   Individual Concurrent Group Co-treatment   Time In 3991         Time Out 0928         Minutes 45              Co-treatment with PT warranted secondary to decreased safety and independence requiring 2 skilled therapy professionals to address individual discipline's goals. OT addressing sitting balance for increased ADL performance, fall prevention, functional mobility for ADL transfers, ability to sequence and follow directions and functional UE strength.     CARLO Alarcon

## 2022-04-12 ENCOUNTER — APPOINTMENT (OUTPATIENT)
Dept: GENERAL RADIOLOGY | Age: 82
DRG: 698 | End: 2022-04-12
Payer: MEDICARE

## 2022-04-12 ENCOUNTER — APPOINTMENT (OUTPATIENT)
Dept: CT IMAGING | Age: 82
DRG: 698 | End: 2022-04-12
Payer: MEDICARE

## 2022-04-12 LAB
ESTIMATED AVERAGE GLUCOSE: 177 MG/DL
GLUCOSE BLD-MCNC: 197 MG/DL (ref 75–110)
GLUCOSE BLD-MCNC: 198 MG/DL (ref 75–110)
GLUCOSE BLD-MCNC: 216 MG/DL (ref 75–110)
GLUCOSE BLD-MCNC: 227 MG/DL (ref 75–110)
HBA1C MFR BLD: 7.8 % (ref 4–6)
PRO-BNP: 125 PG/ML

## 2022-04-12 PROCEDURE — 2580000003 HC RX 258: Performed by: STUDENT IN AN ORGANIZED HEALTH CARE EDUCATION/TRAINING PROGRAM

## 2022-04-12 PROCEDURE — 71250 CT THORAX DX C-: CPT

## 2022-04-12 PROCEDURE — 6370000000 HC RX 637 (ALT 250 FOR IP): Performed by: NURSE PRACTITIONER

## 2022-04-12 PROCEDURE — 94761 N-INVAS EAR/PLS OXIMETRY MLT: CPT

## 2022-04-12 PROCEDURE — 2700000000 HC OXYGEN THERAPY PER DAY

## 2022-04-12 PROCEDURE — 6360000002 HC RX W HCPCS: Performed by: INTERNAL MEDICINE

## 2022-04-12 PROCEDURE — 36415 COLL VENOUS BLD VENIPUNCTURE: CPT

## 2022-04-12 PROCEDURE — 94640 AIRWAY INHALATION TREATMENT: CPT

## 2022-04-12 PROCEDURE — 6370000000 HC RX 637 (ALT 250 FOR IP): Performed by: STUDENT IN AN ORGANIZED HEALTH CARE EDUCATION/TRAINING PROGRAM

## 2022-04-12 PROCEDURE — 99233 SBSQ HOSP IP/OBS HIGH 50: CPT | Performed by: STUDENT IN AN ORGANIZED HEALTH CARE EDUCATION/TRAINING PROGRAM

## 2022-04-12 PROCEDURE — 71045 X-RAY EXAM CHEST 1 VIEW: CPT

## 2022-04-12 PROCEDURE — 6360000002 HC RX W HCPCS: Performed by: STUDENT IN AN ORGANIZED HEALTH CARE EDUCATION/TRAINING PROGRAM

## 2022-04-12 PROCEDURE — 2000000000 HC ICU R&B

## 2022-04-12 PROCEDURE — 83880 ASSAY OF NATRIURETIC PEPTIDE: CPT

## 2022-04-12 PROCEDURE — 82947 ASSAY GLUCOSE BLOOD QUANT: CPT

## 2022-04-12 PROCEDURE — 2580000003 HC RX 258: Performed by: NURSE PRACTITIONER

## 2022-04-12 PROCEDURE — 93970 EXTREMITY STUDY: CPT

## 2022-04-12 PROCEDURE — 92526 ORAL FUNCTION THERAPY: CPT

## 2022-04-12 RX ORDER — SODIUM CHLORIDE 9 MG/ML
INJECTION, SOLUTION INTRAVENOUS CONTINUOUS
Status: DISCONTINUED | OUTPATIENT
Start: 2022-04-12 | End: 2022-04-16 | Stop reason: HOSPADM

## 2022-04-12 RX ADMIN — METHYLPREDNISOLONE SODIUM SUCCINATE 40 MG: 40 INJECTION, POWDER, FOR SOLUTION INTRAMUSCULAR; INTRAVENOUS at 01:08

## 2022-04-12 RX ADMIN — SODIUM CHLORIDE, PRESERVATIVE FREE 10 ML: 5 INJECTION INTRAVENOUS at 08:33

## 2022-04-12 RX ADMIN — METFORMIN HYDROCHLORIDE 850 MG: 850 TABLET ORAL at 08:32

## 2022-04-12 RX ADMIN — BUDESONIDE 500 MCG: 0.5 SUSPENSION RESPIRATORY (INHALATION) at 09:17

## 2022-04-12 RX ADMIN — METFORMIN HYDROCHLORIDE 850 MG: 850 TABLET ORAL at 19:24

## 2022-04-12 RX ADMIN — METHYLPREDNISOLONE SODIUM SUCCINATE 40 MG: 40 INJECTION, POWDER, FOR SOLUTION INTRAMUSCULAR; INTRAVENOUS at 18:29

## 2022-04-12 RX ADMIN — SERTRALINE 25 MG: 25 TABLET, FILM COATED ORAL at 08:33

## 2022-04-12 RX ADMIN — INSULIN LISPRO 2 UNITS: 100 INJECTION, SOLUTION INTRAVENOUS; SUBCUTANEOUS at 12:47

## 2022-04-12 RX ADMIN — INSULIN LISPRO 2 UNITS: 100 INJECTION, SOLUTION INTRAVENOUS; SUBCUTANEOUS at 22:14

## 2022-04-12 RX ADMIN — BUDESONIDE 500 MCG: 0.5 SUSPENSION RESPIRATORY (INHALATION) at 19:56

## 2022-04-12 RX ADMIN — LATANOPROST 1 DROP: 50 SOLUTION OPHTHALMIC at 22:46

## 2022-04-12 RX ADMIN — SODIUM CHLORIDE, PRESERVATIVE FREE 10 ML: 5 INJECTION INTRAVENOUS at 01:08

## 2022-04-12 RX ADMIN — Medication 2000 UNITS: at 08:33

## 2022-04-12 RX ADMIN — CEFTRIAXONE SODIUM 1000 MG: 1 INJECTION, POWDER, FOR SOLUTION INTRAMUSCULAR; INTRAVENOUS at 14:59

## 2022-04-12 RX ADMIN — TAMSULOSIN HYDROCHLORIDE 0.8 MG: 0.4 CAPSULE ORAL at 08:33

## 2022-04-12 RX ADMIN — INSULIN LISPRO 1 UNITS: 100 INJECTION, SOLUTION INTRAVENOUS; SUBCUTANEOUS at 08:34

## 2022-04-12 RX ADMIN — INSULIN LISPRO 1 UNITS: 100 INJECTION, SOLUTION INTRAVENOUS; SUBCUTANEOUS at 18:29

## 2022-04-12 RX ADMIN — SODIUM CHLORIDE: 9 INJECTION, SOLUTION INTRAVENOUS at 12:46

## 2022-04-12 RX ADMIN — SODIUM CHLORIDE, PRESERVATIVE FREE 10 ML: 5 INJECTION INTRAVENOUS at 22:14

## 2022-04-12 RX ADMIN — METHYLPREDNISOLONE SODIUM SUCCINATE 40 MG: 40 INJECTION, POWDER, FOR SOLUTION INTRAMUSCULAR; INTRAVENOUS at 08:33

## 2022-04-12 RX ADMIN — FINASTERIDE 5 MG: 5 TABLET, FILM COATED ORAL at 08:33

## 2022-04-12 ASSESSMENT — PAIN SCALES - GENERAL
PAINLEVEL_OUTOF10: 0
PAINLEVEL_OUTOF10: 0

## 2022-04-12 NOTE — PROGRESS NOTES
RN [place] NP regarding Diabetes diagnosis in active problem list and new order for solumedrol. RN asking if patient should have accu checks and insulin orders d/t the combination of diagnosis and treatment. New order received for accu checks and insulin slidding scales.

## 2022-04-12 NOTE — PROGRESS NOTES
[facility]   Urology Progress Note            Subjective:Patient seen on behalf of Dr. Fred Bowers and Dr. Lisa Galeano   Follow-up neurogenic bladder chronic retention, Gross hematuria    Patient Vitals for the past 24 hrs:   BP Temp Temp src Pulse Resp SpO2   04/12/22 0136 -- -- -- -- -- 95 %   04/12/22 0104 -- -- -- -- -- 96 %   04/1940 (!) 110/57 97.9 °F (36.6 °C) Axillary 70 16 91 %   04/11/22 1747 -- -- -- -- -- 92 %   04/11/22 1522 101/64 98.9 °F (37.2 °C) Oral 71 16 93 %   04/11/22 0932 -- -- -- -- -- 92 %   04/11/22 0733 124/68 98.2 °F (36.8 °C) Oral 65 16 92 %       Intake/Output Summary (Last 24 hours) at 4/12/2022 0625  Last data filed at 4/12/2022 0617  Gross per 24 hour   Intake 558.41 ml   Output 1375 ml   Net -816.59 ml       Recent Labs     04/11/22  0832   WBC 5.8   HGB 12.4*   HCT 38.5*   MCV 93.7        Recent Labs     04/11/22  0832      K 3.8      CO2 25   BUN 19   CREATININE 0.67*       No results for input(s): COLORU, PHUR, LABCAST, WBCUA, RBCUA, MUCUS, TRICHOMONAS, YEAST, BACTERIA, CLARITYU, SPECGRAV, LEUKOCYTESUR, UROBILINOGEN, Thomas Radha in the last 72 hours. Invalid input(s): NITRATE, GLUCOSEUKETONESUAMORPHOUS    Additional Lab/culture results:    Physical Exam: Worley catheter in place bladder decompressed we will discuss status with the patient's daughter later today.     Patient activity following up at the office with void trial and possible plan for further urologic evaluation and surgery    Interval Imaging Findings:    Impression:    Patient Active Problem List   Diagnosis    Type 2 diabetes mellitus, without long-term current use of insulin ([address]    Essential hypertension    Advanced open-angle glaucoma    Benign non-nodular prostatic hyperplasia with lower urinary tract symptoms    Dry eyes, bilateral    Elevated PSA    Blindness of both eyes    Cl[facility] right hip fracture ([facility] [address]    UTI (urinary tract infection)    Moderate protein-calorie malnutrition ([facility] [address]    Toxic metabolic encephalopathy    Tobacco abuse    Acute respiratory failure with hypoxia (HCC)       Plan: Maintain an indwelling Worley until discharge    May Magallon MD  6:25 AM 4/12/2022

## 2022-04-12 NOTE — PROGRESS NOTES
Speech Language Pathology  Speech Language Pathology  [facility]    Dysphagia Treatment Note    Date: 4/12/2022  Patients Name: Rigo Alvarado  MRN: [id number]  Diagnosis:   Patient Active Problem List   Diagnosis Code    Type 2 diabetes mellitus, without long-term current use of insulin ([address] E11.9    Essential hypertension I10    Advanced open-angle glaucoma H40.10X0    Benign non-nodular prostatic hyperplasia with lower urinary tract symptoms N40.1    Dry eyes, bilateral H04.123    Elevated PSA R97.20    Blindness of both eyes H54.3    Closed right hip fracture ([address] S72.001A    UTI (urinary tract infection) N39.0    Moderate protein-calorie malnutrition (HCC) E44.0    Toxic metabolic encephalopathy Y24.3    Tobacco abuse Z72.0    Acute respiratory failure with hypoxia (HCC) J96.01       Pain: Pt denies    Dysphagia Treatment  Treatment time: 0932-9373    Subjective: [x] Alert [] Cooperative     [] Confused     [x] Agitated    [] Lethargic    Objective/Assessment:    Pt. Seen for O/M treatment program.  Pt. Completed O/M exercises X 10 X 1 sets with mod-max verbal cues. Pt. Completed fawn maneuver X 1 reps with max cues. Education provided and exercises left at bedside (pt agreed although blind he may have someone read exercises to him). Pt was agreeable to complete exercises but frustrated throughout regarding current medical situation/hospitalization. Plan:  [x] Continue ST services    [] Discharge from ST:        Discharge recommendations: []  Further therapy recommended at discharge. The patient should be able to tolerate at least 3 hours of therapy per day over 5 days or 15 hours over 7 days. [x] Further therapy recommended at discharge. [] No therapy recommended at discharge.          Treatment completed by: Jacobo Holley, SLP, M.S. Orlando Yang

## 2022-04-12 NOTE — PROGRESS NOTES
[facility]  Office: [address], DO, Rachael Orosco, DO, Srinath Pérez, DO, Bryan Martin Blood, DO, Aravind Crowe MD, Jimenez Payne MD, Shayy Espino MD, Heri Cope MD, Anamaria Henriquez MD, Chayito Blum MD, Janette Strickland MD, Harmony Banuelos, DO, Christine Gonzalez, DO, Lexi Ham MD,  Nathanial Apgar, DO, Daryl Karimi MD, Mirella Gutierrez MD, Codey Walker MD, Kishan Hernandez DO, Jayesh Alonso MD, Tempie Mcburney, MD, Regina Nash, [facility], [facility], CNP, Mely Joseph, CNP, Lexi Centeno, CNP, Frank Morton, CNP, Kae Dubin, CNP, Yony Moore, CNP, Feliberto Whipple PA-C, Sebastian Regan, [facility], Angelina Martinez, CNP, Jie Vega, CNP, Ayla Alfred, CNP, Ursula Rojas, CNS, Bobbi Saez, [facility], Bereket Granados, CNP, Tiffany Garduno, CNP, Jayla Dubon, [facility]    Progress Note    2022    3:44 PM    Name:   Karina Geller  MRN:     [id number]     Arielle[place]side:      [de-identified]   Room:   [address] [place] [address] Day:  4  Admit Date:  2022  2:52 AM    PCP:   George Nuñez DO  Code Status:  Full Code    Subjective:     C/C:   Chief Complaint   Patient presents with    Altered Mental Status    Urinary Tract Infection     Interval History Status:worse    Cxr this morning noted to have pneumothorax  Patient was moved to ICU, initially plan was for chest tube placement however I repeated a stat chest x-ray and pneumothorax is decreasing so chest tube placement was held  Patient was moved to ICU  Started on nonrebreather  On my examination patient is not in any respiratory distress but continues to be on nasal oxygen around 5 to 6 L. Complains of dry mouth    Brief History:     82-year-old male with known medical history of chronic urinary retention, on chronic Worley catheter presents to the hospital for altered mental status. As per family patient has been acting more confused for last 2 days.   Patient gets his Worley changed every month. Per chart review patient had cloudy urine and was acting very confused. On my evaluation patient is oriented to place and self but not very cooperative in my history and exam.  States that he has pain in the scrotal area. Has chronic Worley in place. Patient was seen to have pneumothorax  Review of Systems:   Patient is altered, review of system not possible    Medications: Allergies:  No Known Allergies    Current Meds:   Scheduled Meds:    sodium chloride  100 mL IntraVENous Once    methylPREDNISolone  40 mg IntraVENous Q8H    budesonide  0.5 mg Nebulization BID    insulin lispro  0-6 Units SubCUTAneous TID     insulin lispro  0-3 Units SubCUTAneous Nightly    fluticasone  2 spray Each Nostril Daily    cefTRIAXone (ROCEPHIN) IV  1,000 mg IntraVENous Q24H    lidocaine  5 mL Topical Once    latanoprost  1 drop Right Eye Nightly    tamsulosin  0.8 mg Oral Daily    metFORMIN  850 mg Oral BID     Vitamin D  2,000 Units Oral Daily    sertraline  25 mg Oral Daily    sodium chloride flush  5-40 mL IntraVENous 2 times per day    enoxaparin  40 mg SubCUTAneous Daily    nicotine  1 patch TransDERmal Daily    finasteride  5 mg Oral Daily     Continuous Infusions:    sodium chloride 100 mL/hr at 04/12/22 1246    dextrose      sodium chloride Stopped (04/11/22 1654)     PRN Meds: sodium chloride flush, ipratropium-albuterol, glucagon (rDNA), dextrose, dextrose bolus (hypoglycemia) **OR** dextrose bolus (hypoglycemia), glucose, QUEtiapine, potassium chloride **OR** potassium alternative oral replacement **OR** potassium chloride, sodium chloride flush, sodium chloride, ondansetron **OR** ondansetron, acetaminophen **OR** acetaminophen, polyethylene glycol, oxyCODONE-acetaminophen    Data:     Past Medical History:   has a past medical history of Hip fracture ([facility] [address] Social History:   reports that he has been smoking cigarettes. He has a 60.00 pack-year smoking history.  He has never used smokeless tobacco. He reports current drug use. Drug: Marijuana Edithmary Cisneros). He reports that he does not drink alcohol. Family History:   Family History   Problem Relation Age of Onset    High Blood Pressure Mother     Heart Disease Mother     Kidney Disease Mother     Other Sister         obesity       Vitals:  BP (!) 105/56   Pulse 56   Temp 97.6 °F (36.4 °C) (Oral)   Resp 18   Ht 5' 11\" (1.803 m)   Wt 131 lb 8 oz (59.6 kg)   SpO2 98%   BMI 18.34 kg/m²   Temp (24hrs), Av.7 °F (36.5 °C), Min:97.6 °F (36.4 °C), Max:97.9 °F (36.6 °C)    Recent Labs     22  16222  0603 22  1214   POCGLU 117* 199* 197* 227*       I/O (24Hr):     Intake/Output Summary (Last 24 hours) at 2022 1544  Last data filed at 2022 0617  Gross per 24 hour   Intake 558.41 ml   Output 775 ml   Net -216.59 ml       Labs:  Hematology:  Recent Labs     22  0832   WBC 5.8   RBC 4.11*   HGB 12.4*   HCT 38.5*   MCV 93.7   MCH 30.2   MCHC 32.2   RDW 14.5*      MPV 9.8   DDIMER 1.26*     Chemistry:  Recent Labs     22  0832 22  0704     --    K 3.8  --      --    CO2 25  --    GLUCOSE 82  --    BUN 19  --    CREATININE 0.67*  --    ANIONGAP 9  --    LABGLOM >60  --    GFRAA >60  --    CALCIUM 8.6  --    PROBNP  --  125     Recent Labs     22  0559 22  1131 22  1625 22  0603 22  1214   LABA1C  --   --   --  7.8*  --   --   --    POCGLU 85 86 117*  --  199* 197* 227*     ABG:  Lab Results   Component Value Date    POCPH 7.458 2022    POCPCO2 34.3 2022    POCPO2 49.9 2022    POCHCO3 24.3 2022    PBEA 1 2022    YBOO0SFS 87 2022    FIO2 50.0 2022     Lab Results   Component Value Date/Time    SPECIAL R FA 8ML 2022 04:20 AM     Lab Results   Component Value Date/Time    CULTURE NO GROWTH 4 DAYS 2022 04:20 AM    CULTURE SERRATIA MARCESCENS >620625 CFU/ML (A) 04/08/2022 04:20 AM       Radiology:  CT HEAD WO CONTRAST    Result Date: 4/8/2022  1. No acute intracranial abnormality. 2. Mild-to-moderate global parenchymal volume loss with chronic microvascular ischemic changes. 3. Scattered atherosclerosis of the intracranial vasculature. Physical Examination:        General appearance: confused, awake, alert  Mental Status: Not oriented to place, oriented to self, time, could tell me the president's name  Lungs:  bilateral crackles at bases, on 4lt nc oxygen  Heart:  regular rate and rhythm, no murmur  Abdomen:  soft, nontender, nondistended, normal bowel sounds, no masses, hepatomegaly, splenomegaly, modi in place  Extremities:  no edema, redness, tenderness in the calves  Skin:  no gross lesions, rashes, induration    Assessment:        Hospital Problems           Last Modified POA    * (Principal) UTI (urinary tract infection) 4/8/2022 Yes    Type 2 diabetes mellitus, without long-term current use of insulin ([address] 4/8/2022 Yes    Essential hypertension 4/8/2022 Yes    Advanced open-angle glaucoma 4/8/2022 Yes    Benign non-nodular prostatic hyperplasia with lower urinary tract symptoms 4/8/2022 Yes    Overview Signed 8/27/2021  1:08 PM by Laly Alvarenga,      Formatting of this note might be different from the original.  luts - discussed options. He has cut down some on caffeine but not completely. His symptoms are somewhat improved. He is not bothered by them at this point and because of this does not want any further intervention. Moderate protein-calorie malnutrition ([address] 4/8/2022 Yes    Toxic metabolic encephalopathy 7/0/2873 Yes    Tobacco abuse 4/8/2022 Yes    Acute respiratory failure with hypoxia ([address] 4/10/2022 Yes          Plan:        Pneumothorax-chest x-ray shows improvement in pneumothorax, chest tube placement held, patient moved to ICU, started on nonrebreather, critical care following.   Monitor oxygen saturation. Acute respiratory failure-secondary to COPD exacerbation-CT PE scan was done which did not show any pulmonary embolism but did show changes of emphysema. Continue breathing treatment, continue Symbicort, continue Rocephin    Toxic metabolic encephalopathy with UTI-UTI likely secondary to chronic Worley,culture growing serratia, on rocephin, sensitive, blood cultures negative for now. Mentation improving. Neuro signed off.     Concern for aspiration- swallow study done, recommend regular diet with honey thick liquid    BPH-continue finasteride and Flomax, follows up with urology outpatient     Type 2 diabetes mellitus-on Metformin, continue in the hospital, POCT blood sugar checks at meals and at bedtime.     Hypertension- bp stable off medications, avoid hypotension     Replace potassium    Nicotine patch for tobacco abuse     Sitter at bedside  Pt/ot idris Dalton MD  4/12/2022  3:44 PM

## 2022-04-12 NOTE — CARE COORDINATION
CXR done this AM showing new rt pneumothorax. On non re breather mask. Will be transferring to ICU. Plan is SNF at discharge.

## 2022-04-12 NOTE — PROGRESS NOTES
DATE: 2022    NAME: Nikole Campos  MRN: [id number]   : 1940    Patient not seen this date for Physical Therapy due to:      [] Cancel by RN or physician due to:    [] Hemodialysis    [] Critical Lab Value Level     [] Blood transfusion in progress    [] Acute or unstable cardiovascular status   _MAP < 55 or more than >115  _HR < 40 or > 130    [] Acute or unstable pulmonary status   -FiO2 > 60%   _RR < 5 or >40    _O2 sats < 85%    [] Strict Bedrest    [] Off Unit for surgery or procedure    [] Off Unit for testing       [] Pending imaging to R/O fracture    [] Refusal by Patient      [x] Other transfer to ICU due to pneumothorax      [] PT being discontinued at this time. Patient independent. No further needs. [] PT being discontinued at this time as the patient has been transferred to hospice care. No further needs.       SHAY FLORES, PTA

## 2022-04-12 NOTE — PROGRESS NOTES
Pt transferred into room 1107 per bed from [facility]. Report received from [facility]. VSS. Patient resting comfortably. Will continue to monitor.

## 2022-04-12 NOTE — PLAN OF CARE
Problem: Falls - Risk of:  Goal: Will remain free from falls  Description: Will remain free from falls  4/12/2022 0003 by Sylvia Lombardo RN  Outcome: Ongoing  4/11/2022 1101 by Dennys Gutierrez RN  Outcome: Ongoing  Goal: Absence of physical injury  Description: Absence of physical injury  4/12/2022 0003 by Sylvia Lombardo RN  Outcome: Ongoing  4/11/2022 1101 by Dennys Gutierrez RN  Outcome: Ongoing     Problem: Pain:  Goal: Pain level will decrease  Description: Pain level will decrease  4/12/2022 0003 by Sylvia Lombardo RN  Outcome: Ongoing  4/11/2022 1101 by Dennys Gutierrez RN  Outcome: Ongoing  Goal: Control of acute pain  Description: Control of acute pain  4/12/2022 0003 by Sylvia Lombardo RN  Outcome: Ongoing  4/11/2022 1101 by Dennys Gutierrez RN  Outcome: Ongoing  Goal: Control of chronic pain  Description: Control of chronic pain  4/12/2022 0003 by Sylvia Lombardo RN  Outcome: Ongoing  4/11/2022 1101 by Dennys Gutierrez RN  Outcome: Ongoing     Problem: Skin Integrity:  Goal: Will show no infection signs and symptoms  Description: Will show no infection signs and symptoms  4/12/2022 0003 by Sylvia Lombardo RN  Outcome: Ongoing  4/11/2022 1101 by Dennys Gutierrez RN  Outcome: Ongoing  Goal: Absence of new skin breakdown  Description: Absence of new skin breakdown  4/12/2022 0003 by Sylvia Lombardo RN  Outcome: Ongoing  4/11/2022 1101 by Dennys Gutierrez RN  Outcome: Ongoing     Problem: Sensory:  Goal: General experience of comfort will improve  Description: General experience of comfort will improve  4/12/2022 0003 by Sylvia Lombardo RN  Outcome: Ongoing  4/11/2022 1101 by Dennys Gutierrez RN  Outcome: Ongoing     Problem: Urinary Elimination:  Goal: Signs and symptoms of infection will decrease  Description: Signs and symptoms of infection will decrease  4/12/2022 0003 by Sylvia Lombardo RN  Outcome: Ongoing  4/11/2022 1101 by Dennys Gutierrez RN  Outcome: Ongoing  Goal: Ability to reestablish a normal urinary elimination pattern will improve - after catheter removal  Description: Ability to reestablish a normal urinary elimination pattern will improve  4/12/2022 0003 by Brenda Angel RN  Outcome: Ongoing  4/11/2022 1101 by Adam Gill RN  Outcome: Ongoing  Goal: Complications related to the disease process, condition or treatment will be avoided or minimized  Description: Complications related to the disease process, condition or treatment will be avoided or minimized  4/12/2022 0003 by Brenda Angel RN  Outcome: Ongoing  4/11/2022 1101 by Adam Gill RN  Outcome: Ongoing     Problem: Coping:  Goal: Ability to remain calm will improve  Description: Ability to remain calm will improve  4/12/2022 0003 by Brenda Angel RN  Outcome: Ongoing  4/11/2022 1101 by Adam Gill RN  Outcome: Ongoing     Problem: Safety:  Goal: Ability to remain free from injury will improve  Description: Ability to remain free from injury will improve  4/12/2022 0003 by Brenda Angel RN  Outcome: Ongoing  4/11/2022 1101 by Adam Gill RN  Outcome: Ongoing     Problem: Self-Care:  Goal: Ability to participate in self-care as condition permits will improve  Description: Ability to participate in self-care as condition permits will improve  4/12/2022 0003 by Brenda Angel RN  Outcome: Ongoing  4/11/2022 1101 by Adam Gill RN  Outcome: Ongoing

## 2022-04-12 NOTE — PROGRESS NOTES
Pulmonary Critical Care Progress Note    Patient seen for the follow up of UTI (urinary tract infection)     Subjective:    Patient had a pneumothorax on chest x-ray around 3 cm this morning. He had been on oxygen. He was transferred to ICU for concern of decompensation was placed on a nonrebreather mask. I was informed by RN that IR reviewed follow-up x-ray and decided to hold off pigtail catheter placement since pneumothorax is smaller. Patient denies chest pain he has decompensation to 86% on room air. He denies shortness of breath at rest.  Examination:    Vitals: /68   Pulse 64   Temp 97.6 °F (36.4 °C) (Oral)   Resp 16   Ht 5' 11\" (1.803 m)   Wt 131 lb 8 oz (59.6 kg)   SpO2 99%   BMI 18.34 kg/m²   SpO2  Av.9 %  Min: 90 %  Max: 99 %  General appearance: alert and cooperative with exam legally blind  Neck: No JVD  Lungs: Decreased breath sounds no crackles or wheezing  Heart: regular rate and rhythm, S1, S2 normal, no gallop  Abdomen: Soft, non tender, + BS  Extremities: no cyanosis or clubbing.  No significant edema    LABs:    CBC:   Recent Labs     22  0832   WBC 5.8   HGB 12.4*   HCT 38.5*        BMP:   Recent Labs     22  0832      K 3.8   CO2 25   BUN 19   CREATININE 0.67*   LABGLOM >60   GLUCOSE 82       Radiology:    Chest x-ray   Slightly smaller apical pneumothorax.  No other interval change          I    Mpression:    · Acute hypoxic respiratory insufficiency  · Acute Exacerbation of COPD/emphysema   · spontaneous right pneumothorax  · atelectasis/basilar consolidation possible aspiration  · Active smoker  · Elevated D-dimer  · UTI/indwelling Worley  · Encephalopathy improved  ·  legally blind  · Compression fracture at T5       Recommendations:    · Oxygen by nasal cannula  · Home O2 evaluation before discharge ABG  · DuoNeb by nebulizer  · Pulmicort 0.5 every 12 hours  · Solu-Medrol 40 IV every 8 hours  · Rocephin IV   · Lower extremity venous Dopplers  ·  repeat chest x-ray  · discussed with IR Dr Catrina Carl consult  for pigtail; will discuss with her partner for back up in case of decompensation ; will order CT chest to evaluate for bullae.    · Speech swallow evaluation  · Neurology on consult  · Urology on consult  · Consider MRI thoracic spine  · Smoking cessation advised/nicotine patch  · DVT prophylaxis      Reilly De Luna MD, MD, [facility]  Pulmonary Critical Care and Sleep Medicine,  [facility]  Cell: [phone]  Office: [phone]

## 2022-04-12 NOTE — PROGRESS NOTES
Occupational Therapy  DATE: 2022    NAME: Vesta Alexander  MRN: [id number]   : 1940    Patient not seen this date for Occupational Therapy due to:      [x] Cancel by RN or physician due to:moving to ICU d/t illness. O.T. to assess when appropriate. [] Hemodialysis    [] Critical Lab Value Level     [] Blood transfusion in progress    [] Acute or unstable cardiovascular status   _MAP < 55 or more than >115  _HR < 40 or > 130    [] Acute or unstable pulmonary status   -FiO2 > 60%   _RR < 5 or >40    _O2 sats < 85%    [] Strict Bedrest    [] Off Unit for surgery or procedure    [] Off Unit for testing       [] Pending imaging to R/O fracture    [] Refusal by Patient      [] Other      [] OT being discontinued at this time. Patient independent. No further needs. []OT being discontinued at this time as the patient has been transferred to hospice care. No further needs.       Josey Collado CARLO

## 2022-04-12 NOTE — PLAN OF CARE
Problem: Falls - Risk of:  Goal: Will remain free from falls  Description: Will remain free from falls  4/12/2022 1308 by Sophia Cheema RN  Outcome: Ongoing     Problem: Falls - Risk of:  Goal: Absence of physical injury  Description: Absence of physical injury  4/12/2022 1308 by Sophia Cheema RN  Outcome: Ongoing     Problem: Pain:  Goal: Pain level will decrease  Description: Pain level will decrease  4/12/2022 1308 by Sophia Cheema RN  Outcome: Ongoing     Problem: Pain:  Goal: Control of acute pain  Description: Control of acute pain  4/12/2022 1308 by Sophia Cheema RN  Outcome: Ongoing     Problem: Pain:  Goal: Control of chronic pain  Description: Control of chronic pain  4/12/2022 1308 by Sophia Cheema RN  Outcome: Ongoing     Problem: Skin Integrity:  Goal: Will show no infection signs and symptoms  Description: Will show no infection signs and symptoms  4/12/2022 1308 by Sophia Cheema RN  Outcome: Ongoing     Problem: Skin Integrity:  Goal: Will show no infection signs and symptoms  Description: Will show no infection signs and symptoms  4/12/2022 1308 by Sophia Cheema RN  Outcome: Ongoing     Problem: Skin Integrity:  Goal: Absence of new skin breakdown  Description: Absence of new skin breakdown  4/12/2022 1308 by Sophia Cheema RN  Outcome: Ongoing     Problem: Sensory:  Goal: General experience of comfort will improve  Description: General experience of comfort will improve  4/12/2022 1308 by Sophia Cheema RN  Outcome: Ongoing     Problem: Sensory:  Goal: General experience of comfort will improve  Description: General experience of comfort will improve  4/12/2022 0003 by Sera More RN  Outcome: Ongoing     Problem: Urinary Elimination:  Goal: Signs and symptoms of infection will decrease  Description: Signs and symptoms of infection will decrease  4/12/2022 1308 by Sophia Cheema RN  Outcome: Ongoing     Problem: Urinary Elimination:  Goal: Ability to reestablish a normal urinary elimination pattern will improve - after catheter removal  Description: Ability to reestablish a normal urinary elimination pattern will improve  4/12/2022 1308 by Conner Bravo RN  Outcome: Ongoing     Problem: Urinary Elimination:  Goal: Complications related to the disease process, condition or treatment will be avoided or minimized  Description: Complications related to the disease process, condition or treatment will be avoided or minimized  4/12/2022 1308 by Conner Bravo RN  Outcome: Ongoing     Problem: Coping:  Goal: Ability to remain calm will improve  Description: Ability to remain calm will improve  4/12/2022 1308 by Conner Bravo RN  Outcome: Ongoing     Problem: Safety:  Goal: Ability to remain free from injury will improve  Description: Ability to remain free from injury will improve  4/12/2022 1308 by Conner Bravo RN  Outcome: Ongoing     Problem: Self-Care:  Goal: Ability to participate in self-care as condition permits will improve  Description: Ability to participate in self-care as condition permits will improve  4/12/2022 1308 by Conner Bravo RN  Outcome: Ongoing

## 2022-04-13 ENCOUNTER — APPOINTMENT (OUTPATIENT)
Dept: GENERAL RADIOLOGY | Age: 82
DRG: 698 | End: 2022-04-13
Payer: MEDICARE

## 2022-04-13 LAB
CULTURE: NORMAL
CULTURE: NORMAL
GLUCOSE BLD-MCNC: 154 MG/DL (ref 75–110)
GLUCOSE BLD-MCNC: 187 MG/DL (ref 75–110)
GLUCOSE BLD-MCNC: 201 MG/DL (ref 75–110)
GLUCOSE BLD-MCNC: 214 MG/DL (ref 75–110)
GLUCOSE BLD-MCNC: 286 MG/DL (ref 75–110)
Lab: NORMAL
Lab: NORMAL
SPECIMEN DESCRIPTION: NORMAL
SPECIMEN DESCRIPTION: NORMAL

## 2022-04-13 PROCEDURE — 6360000002 HC RX W HCPCS: Performed by: INTERNAL MEDICINE

## 2022-04-13 PROCEDURE — 71045 X-RAY EXAM CHEST 1 VIEW: CPT

## 2022-04-13 PROCEDURE — 97110 THERAPEUTIC EXERCISES: CPT

## 2022-04-13 PROCEDURE — 97535 SELF CARE MNGMENT TRAINING: CPT

## 2022-04-13 PROCEDURE — 97116 GAIT TRAINING THERAPY: CPT

## 2022-04-13 PROCEDURE — 6370000000 HC RX 637 (ALT 250 FOR IP): Performed by: STUDENT IN AN ORGANIZED HEALTH CARE EDUCATION/TRAINING PROGRAM

## 2022-04-13 PROCEDURE — 6360000002 HC RX W HCPCS: Performed by: NURSE PRACTITIONER

## 2022-04-13 PROCEDURE — 2580000003 HC RX 258: Performed by: STUDENT IN AN ORGANIZED HEALTH CARE EDUCATION/TRAINING PROGRAM

## 2022-04-13 PROCEDURE — 99232 SBSQ HOSP IP/OBS MODERATE 35: CPT | Performed by: STUDENT IN AN ORGANIZED HEALTH CARE EDUCATION/TRAINING PROGRAM

## 2022-04-13 PROCEDURE — 2060000000 HC ICU INTERMEDIATE R&B

## 2022-04-13 PROCEDURE — 97530 THERAPEUTIC ACTIVITIES: CPT

## 2022-04-13 PROCEDURE — 2580000003 HC RX 258: Performed by: INTERNAL MEDICINE

## 2022-04-13 PROCEDURE — 94761 N-INVAS EAR/PLS OXIMETRY MLT: CPT

## 2022-04-13 PROCEDURE — 6370000000 HC RX 637 (ALT 250 FOR IP): Performed by: NURSE PRACTITIONER

## 2022-04-13 PROCEDURE — 82947 ASSAY GLUCOSE BLOOD QUANT: CPT

## 2022-04-13 PROCEDURE — 2580000003 HC RX 258: Performed by: NURSE PRACTITIONER

## 2022-04-13 PROCEDURE — 94640 AIRWAY INHALATION TREATMENT: CPT

## 2022-04-13 PROCEDURE — 2700000000 HC OXYGEN THERAPY PER DAY

## 2022-04-13 PROCEDURE — 6360000002 HC RX W HCPCS: Performed by: STUDENT IN AN ORGANIZED HEALTH CARE EDUCATION/TRAINING PROGRAM

## 2022-04-13 RX ADMIN — Medication 2000 UNITS: at 07:47

## 2022-04-13 RX ADMIN — METHYLPREDNISOLONE SODIUM SUCCINATE 40 MG: 40 INJECTION, POWDER, FOR SOLUTION INTRAMUSCULAR; INTRAVENOUS at 02:32

## 2022-04-13 RX ADMIN — SODIUM CHLORIDE: 9 INJECTION, SOLUTION INTRAVENOUS at 20:06

## 2022-04-13 RX ADMIN — TAMSULOSIN HYDROCHLORIDE 0.8 MG: 0.4 CAPSULE ORAL at 07:47

## 2022-04-13 RX ADMIN — INSULIN LISPRO 1 UNITS: 100 INJECTION, SOLUTION INTRAVENOUS; SUBCUTANEOUS at 20:07

## 2022-04-13 RX ADMIN — METHYLPREDNISOLONE SODIUM SUCCINATE 40 MG: 40 INJECTION, POWDER, FOR SOLUTION INTRAMUSCULAR; INTRAVENOUS at 07:47

## 2022-04-13 RX ADMIN — ENOXAPARIN SODIUM 40 MG: 100 INJECTION SUBCUTANEOUS at 07:46

## 2022-04-13 RX ADMIN — METFORMIN HYDROCHLORIDE 850 MG: 850 TABLET ORAL at 17:11

## 2022-04-13 RX ADMIN — METFORMIN HYDROCHLORIDE 850 MG: 850 TABLET ORAL at 07:47

## 2022-04-13 RX ADMIN — INSULIN LISPRO 1 UNITS: 100 INJECTION, SOLUTION INTRAVENOUS; SUBCUTANEOUS at 12:29

## 2022-04-13 RX ADMIN — SODIUM CHLORIDE, PRESERVATIVE FREE 10 ML: 5 INJECTION INTRAVENOUS at 07:47

## 2022-04-13 RX ADMIN — INSULIN LISPRO 3 UNITS: 100 INJECTION, SOLUTION INTRAVENOUS; SUBCUTANEOUS at 17:10

## 2022-04-13 RX ADMIN — LATANOPROST 1 DROP: 50 SOLUTION OPHTHALMIC at 20:06

## 2022-04-13 RX ADMIN — METHYLPREDNISOLONE SODIUM SUCCINATE 40 MG: 40 INJECTION, POWDER, FOR SOLUTION INTRAMUSCULAR; INTRAVENOUS at 17:11

## 2022-04-13 RX ADMIN — FLUTICASONE PROPIONATE 2 SPRAY: 50 SPRAY, METERED NASAL at 08:09

## 2022-04-13 RX ADMIN — BUDESONIDE 500 MCG: 0.5 SUSPENSION RESPIRATORY (INHALATION) at 09:47

## 2022-04-13 RX ADMIN — FINASTERIDE 5 MG: 5 TABLET, FILM COATED ORAL at 07:48

## 2022-04-13 RX ADMIN — CEFTRIAXONE SODIUM 1000 MG: 1 INJECTION, POWDER, FOR SOLUTION INTRAMUSCULAR; INTRAVENOUS at 14:32

## 2022-04-13 RX ADMIN — INSULIN LISPRO 2 UNITS: 100 INJECTION, SOLUTION INTRAVENOUS; SUBCUTANEOUS at 07:46

## 2022-04-13 RX ADMIN — BUDESONIDE 500 MCG: 0.5 SUSPENSION RESPIRATORY (INHALATION) at 19:52

## 2022-04-13 RX ADMIN — SERTRALINE 25 MG: 25 TABLET, FILM COATED ORAL at 07:48

## 2022-04-13 ASSESSMENT — ENCOUNTER SYMPTOMS
WHEEZING: 0
COUGH: 1
SHORTNESS OF BREATH: 1
ABDOMINAL DISTENTION: 0

## 2022-04-13 ASSESSMENT — PAIN SCALES - GENERAL
PAINLEVEL_OUTOF10: 0

## 2022-04-13 NOTE — PROGRESS NOTES
[address] [facility]  Speech Language Pathology    Date: 4/13/2022  Patient Name: Oletha Curling  YOB: 1940   AGE: [de-identified] y.o. MRN: [id number]        Patient Not Available for Speech Therapy     Due to:  [] Testing  [] Hemodialysis  [] Cancelled by RN  [] Surgery   [] Intubation/Sedation/Pain Medication  [] Medical instability  [x] Other: ST attempted x2. Both attempts pt sleeping, family at bedside requested to let pt sleep and come back tomorrow. Family at bedside reports pt \"hates that thickened stuff,\" and requests that he be seen by ST tomorrow if possible.      Next scheduled treatment: 4/14/2022  Completed by: Michelle Palafox, SLP

## 2022-04-13 NOTE — PROGRESS NOTES
[facility]   Urology Progress Note            Subjective: Follow-up urinary retention hematuria    Patient Vitals for the past 24 hrs:   BP Temp Temp src Pulse Resp SpO2   04/13/22 0600 134/83 -- -- 62 20 --   04/13/22 0500 119/68 -- -- 52 18 --   04/13/22 0400 124/72 97.9 °F (36.6 °C) Temporal 51 18 97 %   04/13/22 0300 125/73 -- -- 52 15 --   04/13/22 0000 -- 98.2 °F (36.8 °C) Oral 62 21 96 %   04/12/22 2300 117/71 -- -- 62 26 --   04/12/22 2200 (!) 146/81 -- -- 65 17 --   04/12/22 2140 -- 98.6 °F (37 °C) -- -- -- --   04/12/22 2100 125/68 -- -- 61 26 --   04/12/22 2000 133/77 98.6 °F (37 °C) Oral 66 20 98 %   04/12/22 1956 -- -- -- -- -- 100 %   04/12/22 1900 111/72 -- -- 66 20 --   04/12/22 1800 130/65 -- -- 75 18 98 %   04/12/22 1700 (!) 155/84 -- -- 93 20 92 %   04/12/22 1600 116/68 97.8 °F (36.6 °C) Oral 61 18 98 %   04/12/22 1500 121/68 -- -- 64 16 99 %   04/12/22 1400 (!) 105/56 -- -- 56 18 98 %   04/12/22 1300 125/78 -- -- 68 25 90 %   04/12/22 1200 114/68 -- -- 63 13 93 %   04/12/22 1158 -- -- -- -- 15 93 %   04/12/22 1100 117/66 97.6 °F (36.4 °C) Oral 61 17 97 %   04/12/22 0919 -- -- -- -- -- 97 %   04/12/22 0820 136/76 97.7 °F (36.5 °C) Oral 65 18 98 %       Intake/Output Summary (Last 24 hours) at 4/13/2022 0639  Last data filed at 4/13/2022 6288  Gross per 24 hour   Intake 1192.94 ml   Output 660 ml   Net 532.94 ml       Recent Labs     04/11/22  0832   WBC 5.8   HGB 12.4*   HCT 38.5*   MCV 93.7        Recent Labs     04/11/22  0832      K 3.8      CO2 25   BUN 19   CREATININE 0.67*       No results for input(s): COLORU, PHUR, LABCAST, WBCUA, RBCUA, MUCUS, TRICHOMONAS, YEAST, BACTERIA, CLARITYU, SPECGRAV, LEUKOCYTESUR, UROBILINOGEN, BILIRUBINUR, BLOODU in the last 72 hours.     Invalid input(s): NITRATE, GLUCOSEUKETONESUAMORPHOUS    Additional Lab/culture results:    Physical Exam: Patient seen in conjunction with nursing staff, alert no change in neurologic status bladder decompressed catheter in place urine clear    Interval Imaging Findings:    Impression:    Patient Active Problem List   Diagnosis    Type 2 diabetes mellitus, without long-term current use of insulin ([address]    Essential hypertension    Advanced open-angle glaucoma    Benign non-nodular prostatic hyperplasia with lower urinary tract symptoms    Dry eyes, bilateral    Elevated PSA    Blindness of both eyes    Closed right hip fracture (HCC)    UTI (urinary tract infection)    Moderate protein-calorie malnutrition (HCC)    Toxic metabolic encephalopathy    Tobacco abuse    Acute respiratory failure with hypoxia ([address]       Plan: Maintain an indwelling Worley    Raoul Hartmann MD  6:39 AM 4/13/2022

## 2022-04-13 NOTE — PROGRESS NOTES
Occupational Therapy  Facility/Department: [facility] ICU  Daily Treatment Note  NAME: Avila Luevano  : 1940  MRN: [id number]    Date of Service: 2022    Discharge Recommendations:  Patient would benefit from continued therapy after discharge  OT Equipment Recommendations  ADL Assistive Devices: Long-handled Shoe Horn;Emergency Alert System; Reacher;Sock-Aid Hard;Long-handled Sponge    Assessment   Performance deficits / Impairments: Decreased functional mobility ; Decreased ADL status; Decreased safe awareness;Decreased cognition;Decreased endurance;Decreased high-level IADLs;Decreased posture;Decreased balance;Decreased fine motor control;Decreased strength  Assessment: Pt. completed UE exercises while sitting unsupported on EOB to promote functional mobility with success. Pt. required mod assist x2 for ADL transfers for line management and orientation to task and surroundings d/t blindness. Pt. continues to fatigue easily requiring frequent rest breaks but completes each task with success. Skilled OT warranted to promote I/safety to return pt to prior living arrangement with assist as needed. Prognosis: Good  OT Education: OT Role;Transfer Training;Energy Conservation;Plan of Care;ADL Adaptive Strategies  Patient Education: Pt. educated on safety awareness with use of RW and orientation of each task/surroundings. Barriers to Learning: vision impairment  REQUIRES OT FOLLOW UP: Yes  Activity Tolerance  Activity Tolerance: Patient Tolerated treatment well;Treatment limited secondary to decreased cognition  Activity Tolerance: fair  Safety Devices  Type of devices: All fall risk precautions in place; Bed alarm in place;Call light within reach; Left in bed;Gait belt;Nurse notified         Patient Diagnosis(es): The primary encounter diagnosis was Altered mental status, unspecified altered mental status type. A diagnosis of Acute cystitis with hematuria was also pertinent to this visit.       has a past medical history of Hip fracture ([facility] [address] has a past surgical history that includes eye surgery. 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 agreeable for treatment  Response to previous treatment: Patient with no complaints from previous session  Family / Caregiver Present: Yes (daughter)  Subjective  Subjective: \"Yes I remember you. .. Pretty  Pretty  Pretty  what do we need to do today? \"  General Comment  Comments: Pt. supine in bed upon arrival to room      Orientation  Orientation  Overall Orientation Status: Within Functional Limits  Objective    ADL  LE Dressing: Setup;Maximum assistance;Dependent/Total  Additional Comments: Max assist with brief and pants management        Balance  Sitting Balance: Stand by assistance  Standing Balance: Moderate assistance  Standing Balance  Time: 3-4 min  Activity: ADL transfer  Comment: with use of walker  Functional Mobility  Functional - Mobility Device: Rolling Walker  Activity: Other (across room)  Assist Level: Moderate assistance (x2)  Functional Mobility Comments: Pt. required max verbal cues on proper positioning , RW safety, upright posture and proper breathing tech to maintain health O2 levels. Bed mobility  Rolling to Left: Minimal assistance  Rolling to Right: Minimal assistance  Supine to Sit: Minimal assistance  Sit to Supine: Independent  Scooting: Contact guard assistance  Comment: Pt. requires mob verbal cues for orientation to task and surrounding with increased time to complete task  Transfers  Stand Step Transfers: Minimal assistance;2 Person assistance  Sit to stand: Minimal assistance; Moderate assistance;2 Person assistance  Stand to sit: Minimal assistance; Moderate assistance;2 Person assistance  Transfer Comments: Pt. completed multiple transfer across room with min/mod assist x2 for guidance of walker, orientation to surroundings, IV pole and line management. Cognition  Overall Cognitive Status: Exceptions  Arousal/Alertness: Appropriate responses to stimuli  Following Commands: Follows multistep commands with repitition; Follows multistep commands with increased time  Attention Span: Appears intact  Memory: Appears intact  Safety Judgement: Decreased awareness of need for safety;Decreased awareness of need for assistance  Problem Solving: Decreased awareness of errors;Assistance required to identify errors made;Assistance required to correct errors made  Insights: Decreased awareness of deficits  Initiation: Requires cues for some  Sequencing: Requires cues for some     Perception  Overall Perceptual Status: WFL              Type of ROM/Therapeutic Exercise  Type of ROM/Therapeutic Exercise: AROM  Comment: Pt. completed B UE ROM exercises while sitting EOB to promote functional strength and mobility. Exercises  Shoulder Flexion: x10  Shoulder Extension: x10  Horizontal ABduction: x10  Horizontal ADduction: x10  Elbow Flexion: x10  Elbow Extension: x10  Other: Pt. required max verbal/tactile cues on proper tech. for each exercise.      Plan   Plan  Times per week: 4-5x/wk 1x/day as katelynn  Times per day: Daily  Current Treatment Recommendations: [place] Dial Training,Functional Mobility Training,Safety Education & Training,Pain Management,Home Management Training,Self-Care / ADL,Equipment Evaluation, Education, & procurement,Patient/Caregiver Education & Training  Plan Comment: Cont with stated POC      AM-PAC Score        AM-PAC Inpatient Daily Activity Raw Score: 12 (04/13/22 1549)  AM-PAC Inpatient ADL T-Scale Score : 30.6 (04/13/22 1549)  ADL Inpatient CMS 0-100% Score: 66.57 (04/13/22 1549)  ADL Inpatient CMS G-Code Modifier : CL (04/13/22 1549)    Goals  Short term goals  Time Frame for Short term goals: By discharge, pt to demo  Short term goal 1: ADL transfers and functional mobility to SBA with use of AD as needed. Short term goal 2: increased B UE strength by 1/2 grade to assist with functional tasks/ I with simple B UE HEP. Short term goal 3: toileting to SBA with use of AD/grab bars /BSC as needed. Short term goal 4: UB ADLs to Set up and LB ADLs to Min A with use of AD/AE as needed. Short term goal 5: bed mobility to Mod I with use of bedrails as needed. Long term goals  Long term goal 1: Pt to be I with fall prevention education, EC/WS tech, recommendations for AE/discharge with use of handouts as needed. Patient Goals   Patient goals : To go home! Therapy Time   Individual Concurrent Group Co-treatment   Time In 0228         Time Out 0306         Minutes 45           Co-treatment with PT warranted secondary to decreased safety and independence requiring 2 skilled therapy professionals to address individual discipline's goals. OT addressing sitting balance for increased ADL performance, sitting/activity tolerance, functional mobility for ADL transfers, ability to sequence and follow directions and functional UE strength.        CARLO Bell

## 2022-04-13 NOTE — PLAN OF CARE
Problem: Pain:  Goal: Pain level will decrease  Description: Pain level will decrease  Outcome: Met This Shift     Problem: Pain:  Goal: Control of acute pain  Description: Control of acute pain  Outcome: Met This Shift     Problem: Falls - Risk of:  Goal: Will remain free from falls  Description: Will remain free from falls  Outcome: Ongoing     Problem: Falls - Risk of:  Goal: Absence of physical injury  Description: Absence of physical injury  Outcome: Ongoing     Problem: Skin Integrity:  Goal: Will show no infection signs and symptoms  Description: Will show no infection signs and symptoms  Outcome: Ongoing     Problem: Skin Integrity:  Goal: Absence of new skin breakdown  Description: Absence of new skin breakdown  Outcome: Ongoing     Problem: Falls - Risk of:  Goal: Will remain free from falls  Description: Will remain free from falls  Outcome: Ongoing  Goal: Absence of physical injury  Description: Absence of physical injury  Outcome: Ongoing     Problem: Skin Integrity:  Goal: Will show no infection signs and symptoms  Description: Will show no infection signs and symptoms  Outcome: Ongoing  Goal: Absence of new skin breakdown  Description: Absence of new skin breakdown  Outcome: Ongoing     Problem: Sensory:  Goal: General experience of comfort will improve  Description: General experience of comfort will improve  Outcome: Ongoing     Problem: Urinary Elimination:  Goal: Signs and symptoms of infection will decrease  Description: Signs and symptoms of infection will decrease  Outcome: Ongoing  Goal: Ability to reestablish a normal urinary elimination pattern will improve - after catheter removal  Description: Ability to reestablish a normal urinary elimination pattern will improve  Outcome: Ongoing  Goal: Complications related to the disease process, condition or treatment will be avoided or minimized  Description: Complications related to the disease process, condition or treatment will be avoided or minimized  Outcome: Ongoing     Problem: Coping:  Goal: Ability to remain calm will improve  Description: Ability to remain calm will improve  Outcome: Ongoing     Problem: Safety:  Goal: Ability to remain free from injury will improve  Description: Ability to remain free from injury will improve  Outcome: Ongoing     Problem: Self-Care:  Goal: Ability to participate in self-care as condition permits will improve  Description: Ability to participate in self-care as condition permits will improve  Outcome: Ongoing

## 2022-04-13 NOTE — PROGRESS NOTES
[facility]  Office: [address], DO, Kallie Farmer, DO, Victor Manuel Patee, DO, Kay Cota Blood, DO, El Meza MD, America Villegas MD, João Hutton MD, Latoya Gandhi MD, Bert Davila MD, Astrid May MD, Abelardo Lozoya MD, Vilma Stroud, DO, Lorena Nicole, DO, Kodi Adler MD,  Kaitlynn Rota, DO, Bruno Saldaña MD, Vito Moreno MD, Ethan Hassan MD, Ricci Lundborg, DO, Ronn Burgess MD, Christine Anaya MD, Lawrence Feng, [facility], [facility], CNP, Janet Pickett, CNP, Jeanette Arthur, CNP, Cherri Saenz, CNP, Tarik Guevara, CNP, Stacey Tang, CNP, JOSE JiménezC, Ann Hector, DNP, Som Dowd, [facility], Nirmala Ordaz, CNP, Fiona Hudson, CNP, Ryan Fuller, CNS, Ronna Conn, [facility], Sushila Kelly, CNP, Zenaida Jain, CNP, Laura Gonzalez, [facility]    Progress Note    4/13/2022    12:06 PM    Name:   Dalton Ventura  MRN:     [id number]     [place]:      [de-identified]   Room:   [address] [place] [address] Day:  5  Admit Date:  4/8/2022  2:52 AM    PCP:   Richard Koyanagi, DO  Code Status:  Full Code    Subjective:     C/C:   Chief Complaint   Patient presents with    Altered Mental Status    Urinary Tract Infection     Interval History Status:nochange    Patient was noted to be desaturating to 88% but seen to be off oxygen  Breathing has improved  Occasional cough  Xray shows mild apical pneumothorax    Brief History:     81-year-old male with known medical history of chronic urinary retention, on chronic Worley catheter presents to the hospital for altered mental status. As per family patient has been acting more confused for last 2 days. Patient gets his Worley changed every month. Per chart review patient had cloudy urine and was acting very confused. On my evaluation patient is oriented to place and self but not very cooperative in my history and exam.  States that he has pain in the scrotal area.   Has chronic Worley in place. Patient was seen to have pneumothorax  Review of Systems:     Review of Systems   Constitutional: Negative for activity change and appetite change. HENT: Negative for congestion. Hard of hearing   Respiratory: Positive for cough and shortness of breath. Negative for wheezing. Cardiovascular: Negative for chest pain, palpitations and leg swelling. Gastrointestinal: Negative for abdominal distention. Genitourinary: Positive for difficulty urinating. Negative for flank pain. Musculoskeletal: Negative for arthralgias and joint swelling. Neurological: Negative for dizziness and facial asymmetry. Psychiatric/Behavioral: Negative for agitation and behavioral problems. Medications:      Allergies:  No Known Allergies    Current Meds:   Scheduled Meds:    sodium chloride  100 mL IntraVENous Once    methylPREDNISolone  40 mg IntraVENous Q8H    budesonide  0.5 mg Nebulization BID    insulin lispro  0-6 Units SubCUTAneous TID     insulin lispro  0-3 Units SubCUTAneous Nightly    fluticasone  2 spray Each Nostril Daily    cefTRIAXone (ROCEPHIN) IV  1,000 mg IntraVENous Q24H    lidocaine  5 mL Topical Once    latanoprost  1 drop Right Eye Nightly    tamsulosin  0.8 mg Oral Daily    metFORMIN  850 mg Oral BID     Vitamin D  2,000 Units Oral Daily    sertraline  25 mg Oral Daily    sodium chloride flush  5-40 mL IntraVENous 2 times per day    enoxaparin  40 mg SubCUTAneous Daily    nicotine  1 patch TransDERmal Daily    finasteride  5 mg Oral Daily     Continuous Infusions:    sodium chloride 50 mL/hr at 04/13/22 6750    dextrose      sodium chloride Stopped (04/11/22 1654)     PRN Meds: sodium chloride flush, ipratropium-albuterol, glucagon (rDNA), dextrose, dextrose bolus (hypoglycemia) **OR** dextrose bolus (hypoglycemia), glucose, QUEtiapine, potassium chloride **OR** potassium alternative oral replacement **OR** potassium chloride, sodium chloride flush, sodium chloride, ondansetron **OR** ondansetron, acetaminophen **OR** acetaminophen, polyethylene glycol, oxyCODONE-acetaminophen    Data:     Past Medical History:   has a past medical history of Hip fracture ([address] Social History:   reports that he has been smoking cigarettes. He has a 60.00 pack-year smoking history. He has never used smokeless tobacco. He reports current drug use. Drug: Marijuana Betty Olmedo). He reports that he does not drink alcohol. Marijuana use is infrequent    Family History:   Family History   Problem Relation Age of Onset    High Blood Pressure Mother     Heart Disease Mother     Kidney Disease Mother     Other Sister         obesity       Vitals:  /62   Pulse 70   Temp 97.7 °F (36.5 °C) (Temporal)   Resp 11   Ht 5' 11\" (1.803 m)   Wt 131 lb 8 oz (59.6 kg)   SpO2 (!) 87% Comment: pt was eating moving hand o2 was adjusted  BMI 18.34 kg/m²   Temp (24hrs), Av.1 °F (36.7 °C), Min:97.7 °F (36.5 °C), Max:98.6 °F (37 °C)    Recent Labs     22  19522  0036 22  0729 22  1124   POCGLU 216* 154* 201* 187*       I/O (24Hr):     Intake/Output Summary (Last 24 hours) at 2022 1206  Last data filed at 2022 0585  Gross per 24 hour   Intake 1192.94 ml   Output 660 ml   Net 532.94 ml       Labs:  Hematology:  Recent Labs     22  0832   WBC 5.8   RBC 4.11*   HGB 12.4*   HCT 38.5*   MCV 93.7   MCH 30.2   MCHC 32.2   RDW 14.5*      MPV 9.8   DDIMER 1.26*     Chemistry:  Recent Labs     22  0832 22  0704     --    K 3.8  --      --    CO2 25  --    GLUCOSE 82  --    BUN 19  --    CREATININE 0.67*  --    ANIONGAP 9  --    LABGLOM >60  --    GFRAA >60  --    CALCIUM 8.6  --    PROBNP  --  125     Recent Labs     22  2105 22  1214 22  1623 22  1955 22  0036 22  0729 22  1124   LABA1C 7.8*  --   --   --   --   --   --   --    POCGLU  --    < > 227* 198* 216* 154* 201* 187*    < > = values in this interval not displayed. ABG:  Lab Results   Component Value Date    POCPH 7.458 04/11/2022    POCPCO2 34.3 04/11/2022    POCPO2 49.9 04/11/2022    POCHCO3 24.3 04/11/2022    PBEA 1 04/11/2022    GMWZ9IQC 87 04/11/2022    FIO2 50.0 04/11/2022     Lab Results   Component Value Date/Time    SPECIAL R FA 8ML 04/08/2022 04:20 AM     Lab Results   Component Value Date/Time    CULTURE NO GROWTH 5 DAYS 04/08/2022 04:20 AM    CULTURE SERRATIA MARCESCENS >054739 CFU/ML (A) 04/08/2022 04:20 AM       Radiology:  CT HEAD WO CONTRAST    Result Date: 4/8/2022  1. No acute intracranial abnormality. 2. Mild-to-moderate global parenchymal volume loss with chronic microvascular ischemic changes. 3. Scattered atherosclerosis of the intracranial vasculature. Physical Examination:        General appearance: confused, awake, alert  Mental Status: Not oriented to place, oriented to self, time, could tell me the president's name  Lungs:  decr breath sounds at bases, no wheezing  Heart:  regular rate and rhythm, no murmur  Abdomen:  soft, nontender, nondistended, normal bowel sounds, no masses, hepatomegaly, splenomegaly, modi in place  Extremities:  no edema, redness, tenderness in the calves  Skin:  no gross lesions, rashes, induration    Assessment:        Hospital Problems           Last Modified POA    * (Principal) UTI (urinary tract infection) 4/8/2022 Yes    Type 2 diabetes mellitus, without long-term current use of insulin ([facility] [address] 4/8/2022 Yes    Essential hypertension 4/8/2022 Yes    Advanced open-angle glaucoma 4/8/2022 Yes    Benign non-nodular prostatic hyperplasia with lower urinary tract symptoms 4/8/2022 Yes    Overview Signed 8/27/2021  1:08 PM by Cinthia Boles DO     Formatting of this note might be different from the original.  luts - discussed options. He has cut down some on caffeine but not completely. His symptoms are somewhat improved.   He is not bothered by

## 2022-04-13 NOTE — CARE COORDINATION
[facility] Rell Puga is reviewing patient.  Justice Caraballo

## 2022-04-13 NOTE — PROGRESS NOTES
Pulmonary Critical Care Progress Note  Esther Goetz MD     Patient seen for the follow up of continuous pneumothorax, COPD exacerbation    Subjective:  Patient is resting in bed. He denies chest pain he has mild cough. He is on oxygen by nasal cannula. He complains of a dry nose. He is tolerating dysphagia diet. Examination:  Vitals: /75   Pulse 69   Temp 98.7 °F (37.1 °C) (Temporal)   Resp 19   Ht 5' 11\" (1.803 m)   Wt 131 lb 8 oz (59.6 kg)   SpO2 94%   BMI 18.34 kg/m²   General appearance: alert and cooperative with exam  Neck: No JVD  Lungs: Mild wheeze, moderate air exchange  Heart: regular rate and rhythm, S1, S2 normal, no gallop  Abdomen: Soft, non tender, + BS  Extremities: no cyanosis or clubbing. No significant edema    LABs:  CBC:   Recent Labs     04/11/22  0832   WBC 5.8   HGB 12.4*   HCT 38.5*        BMP:   Recent Labs     04/11/22  0832      K 3.8   CO2 25   BUN 19   CREATININE 0.67*   LABGLOM >60   GLUCOSE 82     ABG:  Lab Results   Component Value Date    FIO2 50.0 04/11/2022       Lab Results   Component Value Date    POCPH 7.458 04/11/2022    POCPCO2 34.3 04/11/2022    POCPO2 49.9 04/11/2022    POCHCO3 24.3 04/11/2022    PBEA 1 04/11/2022    EOHL7BUH 87 04/11/2022    FIO2 50.0 04/11/2022     Radiology:  X-ray chest 4/13/2022  Tiny residual apical pneumothorax      Impression:  · Acute hypoxic respiratory insufficiency  · Acute exacerbation of COPD/emphysema  · Spontaneous right pneumothorax  · Atelectasis/basilar consolidation, possible aspiration  · Active smoking  · Elevated D-dimer  · Encephalopathy, improved  · UTI with indwelling Worley  · Compression fracture at T5  · Legally blind    Recommendations:  · Oxygen via nasal cannula  · DuoNeb via nebulizer  · Pulmicort every 12 hours via nebulizer  · IV Solu-Medrol 40 mg every 8 hours  · IV Rocephin  · Repeat x-ray chest this evening to reevaluate pneumothorax again in a.m.   · Lower extremity Dopplers negative  · Swallow evaluation  · Urology following  · Smoking cessation/nicotine patch  · DVT prophylaxis, Lovenox  · GI prophylaxis  · We will follow with you    Nir Hanna MD, [facility]  Pulmonary Critical Care and Sleep Medicine,  [facility]  Cell: [phone]  Office: [phone]

## 2022-04-13 NOTE — PROGRESS NOTES
Physical Therapy  Facility/Department: [facility] ICU  Daily Treatment Note  NAME: Sherman Dunn  : 1940  MRN: [id number]    Date of Service: 2022    Discharge Recommendations:  Patient would benefit from continued therapy after discharge   Pt currently functioning below baseline. Would suggest additional therapy at time of discharge to maximize long term outcomes and prevent re-admission. Please refer to AM-PAC score for current level of function. PT Equipment Recommendations  Equipment Needed: No    Assessment   Body structures, Functions, Activity limitations: Decreased functional mobility ; Decreased endurance;Decreased strength;Decreased balance;Decreased posture  Assessment: Patient requries a MOD x2 with RW for ambulation due to decreased balance and stability and visual deficits. Patient would benefit from continued skilled PT treatment to maximize functional mobility and return to PLOF. Prognosis: Good  Decision Making: High Complexity  PT Education: PT Role;Plan of Care;General Safety; Low Vision Education  REQUIRES PT FOLLOW UP: Yes  Activity Tolerance  Activity Tolerance: Patient limited by cognitive status; Patient limited by endurance; Patient limited by fatigue  Activity Tolerance: Patient has limited vision     Patient Diagnosis(es): The primary encounter diagnosis was Altered mental status, unspecified altered mental status type. A diagnosis of Acute cystitis with hematuria was also pertinent to this visit. has a past medical history of Hip fracture ([address] has a past surgical history that includes eye surgery.     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 Family / Caregiver Present: No  Subjective  Subjective: Patient agreeable to PT treatment  General Comment  Comments: OK for PT per Robel Overall RN          Orientation  Orientation  Overall Orientation Status: Impaired  Cognition   Cognition  Overall Cognitive Status: Exceptions  Arousal/Alertness: Appropriate responses to stimuli  Following Commands: Follows multistep commands with repitition; Follows multistep commands with increased time  Attention Span: Appears intact  Memory: Appears intact  Safety Judgement: Decreased awareness of need for safety;Decreased awareness of need for assistance  Problem Solving: Decreased awareness of errors;Assistance required to identify errors made;Assistance required to correct errors made  Insights: Decreased awareness of deficits  Initiation: Requires cues for some  Sequencing: Requires cues for some  Objective   Bed mobility  Rolling to Left: Minimal assistance  Rolling to Right: Minimal assistance  Supine to Sit: Minimal assistance  Sit to Supine: Minimal assistance  Scooting: Contact guard assistance  Comment: Patient requires MOD VCs/tactile cues for hand placement, progression for bed mobility with increased time and effort. Once EOB assist with posture and positioning to promote core strength and stability. Transfers  Sit to Stand: Moderate Assistance;2 Person Assistance  Stand to sit: Moderate Assistance;2 Person Assistance  Bed to Chair: Moderate assistance;2 Person Assistance  Stand Pivot Transfers: Moderate Assistance;2 Person Assistance  Comment: Patient requires a MOD x2 with RW for increased support and stability in transfer activities. Patient requires MOD encouragement to maximize endurance this treatment. Patient with decreased vision and requies increased assist with RW progression techniques.   Ambulation  Ambulation?: Yes  More Ambulation?: No  Ambulation 1  Surface: level tile  Device: Rolling Walker  Assistance: Minimal assistance; Moderate assistance;2 Person assistance  Quality of Gait: Unsteady, limited by poor vision required 1 assistance to fully mgmt RW and 1 assist to mgmt patient  Gait Deviations: Slow Tori;Decreased step length  Distance: 15' x 4  Stairs/Curb  Stairs?: No  Neuromuscular Education  NDT Treatment: Gait ;Lower extremity; Sitting;Standing  Neuromuscular Comments: VCs req for proper breathing mary (pursed lip breathing) during functional mobility. Tactile and VCs req for postural control during sit<>stands & amb to promote abdominal and erector spinae mm facilitation for increased stability and balance, decreasing kyphosis of the spine. Pt req VCs to correct for forward WS with squatting in addition to pressing firmly into ground with feet, to promote the appropriate body mechanics for sit<>stand transfers. Balance  Posture: Fair  Sitting - Static: Fair;+  Sitting - Dynamic: Fair  Standing - Static: Fair  Standing - Dynamic: Fair;-  Single Leg Stance R Le  Single Leg Stance L Le  Comments: balance assessed with RW  Exercises  Comments: Patient performs CANDIE LE AROM to maintain proper joint mechanics and fluid exchange while promoting tolerance and endurance to activities.   Patient educated on the importance of OOB activities to prevent secondary complications and promote skin integrity      Strength RLE  Strength RLE: WFL  Strength LLE  Strength LLE: [facility] Score  -PAC Inpatient Mobility Raw Score : 16 (22)  AM-PAC Inpatient T-Scale Score : 40.78 (22)  Mobility Inpatient CMS 0-100% Score: 54.16 (22)  Mobility Inpatient CMS G-Code Modifier : CK (22)          Goals  Short term goals  Time Frame for Short term goals: 12 treatments  Short term goal 1: Independent transfers  Short term goal 2: SBA ambulation w/ ' x 1/ CGA ascending/descending 7 steps w/ B HR  Short term goal 3: Good standing balance and posture  Short term goal 4: Tolerate 30 min ther act  Short term goal 5: 5/5 B LE strength  Patient Goals   Patient goals : Home to my own apt.     Plan    Plan  Times per week: 1-2x/day,5-6 days/week  Current Treatment Recommendations: Strengthening,Transfer Training,Balance Training,Endurance Training,Gait Training,Stair training  Safety Devices  Type of devices: Nurse notified,Gait belt,Call light within reach,Chair alarm in place,All fall risk precautions in place,Left in bed (Daughter present)  Restraints  Initially in place: No     Therapy Time   Individual Concurrent Group Co-treatment   Time In 1427         Time Out 1505         Minutes [place], [place]

## 2022-04-14 ENCOUNTER — APPOINTMENT (OUTPATIENT)
Dept: GENERAL RADIOLOGY | Age: 82
DRG: 698 | End: 2022-04-14
Payer: MEDICARE

## 2022-04-14 LAB
ABSOLUTE EOS #: 0 K/UL (ref 0–0.4)
ABSOLUTE IMMATURE GRANULOCYTE: 0.29 K/UL (ref 0–0.3)
ABSOLUTE LYMPH #: 0.72 K/UL (ref 1–4.8)
ABSOLUTE MONO #: 0.72 K/UL (ref 0.2–0.8)
ANION GAP SERPL CALCULATED.3IONS-SCNC: 10 MMOL/L (ref 9–17)
BASOPHILS # BLD: 0 %
BASOPHILS ABSOLUTE: 0 K/UL (ref 0–0.2)
BUN BLDV-MCNC: 33 MG/DL (ref 8–23)
BUN/CREAT BLD: 45 (ref 9–20)
CALCIUM SERPL-MCNC: 8.5 MG/DL (ref 8.6–10.4)
CHLORIDE BLD-SCNC: 104 MMOL/L (ref 98–107)
CO2: 23 MMOL/L (ref 20–31)
CREAT SERPL-MCNC: 0.73 MG/DL (ref 0.7–1.2)
EOSINOPHILS RELATIVE PERCENT: 0 % (ref 1–4)
GFR AFRICAN AMERICAN: >60 ML/MIN
GFR NON-AFRICAN AMERICAN: >60 ML/MIN
GFR SERPL CREATININE-BSD FRML MDRD: ABNORMAL ML/MIN/{1.73_M2}
GLUCOSE BLD-MCNC: 185 MG/DL (ref 75–110)
GLUCOSE BLD-MCNC: 222 MG/DL (ref 75–110)
GLUCOSE BLD-MCNC: 238 MG/DL (ref 70–99)
GLUCOSE BLD-MCNC: 265 MG/DL (ref 75–110)
GLUCOSE BLD-MCNC: 303 MG/DL (ref 75–110)
HCT VFR BLD CALC: 37.3 % (ref 40.7–50.3)
HEMOGLOBIN: 12.2 G/DL (ref 13–17)
IMMATURE GRANULOCYTES: 2 %
LYMPHOCYTES # BLD: 5 % (ref 24–44)
MCH RBC QN AUTO: 30.1 PG (ref 25.2–33.5)
MCHC RBC AUTO-ENTMCNC: 32.7 G/DL (ref 28.4–34.8)
MCV RBC AUTO: 92.1 FL (ref 82.6–102.9)
MONOCYTES # BLD: 5 % (ref 1–7)
MORPHOLOGY: ABNORMAL
MORPHOLOGY: ABNORMAL
NRBC AUTOMATED: 0 PER 100 WBC
PDW BLD-RTO: 14.1 % (ref 11.8–14.4)
PLATELET # BLD: 306 K/UL (ref 138–453)
PMV BLD AUTO: 10 FL (ref 8.1–13.5)
POTASSIUM SERPL-SCNC: 4.4 MMOL/L (ref 3.7–5.3)
RBC # BLD: 4.05 M/UL (ref 4.21–5.77)
SEG NEUTROPHILS: 88 % (ref 36–66)
SEGMENTED NEUTROPHILS ABSOLUTE COUNT: 12.57 K/UL (ref 1.8–7.7)
SODIUM BLD-SCNC: 137 MMOL/L (ref 135–144)
WBC # BLD: 14.3 K/UL (ref 3.5–11.3)

## 2022-04-14 PROCEDURE — 2580000003 HC RX 258: Performed by: NURSE PRACTITIONER

## 2022-04-14 PROCEDURE — 94640 AIRWAY INHALATION TREATMENT: CPT

## 2022-04-14 PROCEDURE — 6360000002 HC RX W HCPCS: Performed by: STUDENT IN AN ORGANIZED HEALTH CARE EDUCATION/TRAINING PROGRAM

## 2022-04-14 PROCEDURE — 6370000000 HC RX 637 (ALT 250 FOR IP): Performed by: STUDENT IN AN ORGANIZED HEALTH CARE EDUCATION/TRAINING PROGRAM

## 2022-04-14 PROCEDURE — 6360000002 HC RX W HCPCS: Performed by: INTERNAL MEDICINE

## 2022-04-14 PROCEDURE — 2580000003 HC RX 258: Performed by: INTERNAL MEDICINE

## 2022-04-14 PROCEDURE — 36415 COLL VENOUS BLD VENIPUNCTURE: CPT

## 2022-04-14 PROCEDURE — 2060000000 HC ICU INTERMEDIATE R&B

## 2022-04-14 PROCEDURE — 82947 ASSAY GLUCOSE BLOOD QUANT: CPT

## 2022-04-14 PROCEDURE — 6370000000 HC RX 637 (ALT 250 FOR IP): Performed by: NURSE PRACTITIONER

## 2022-04-14 PROCEDURE — 2580000003 HC RX 258: Performed by: STUDENT IN AN ORGANIZED HEALTH CARE EDUCATION/TRAINING PROGRAM

## 2022-04-14 PROCEDURE — 2700000000 HC OXYGEN THERAPY PER DAY

## 2022-04-14 PROCEDURE — 99239 HOSP IP/OBS DSCHRG MGMT >30: CPT | Performed by: STUDENT IN AN ORGANIZED HEALTH CARE EDUCATION/TRAINING PROGRAM

## 2022-04-14 PROCEDURE — 94761 N-INVAS EAR/PLS OXIMETRY MLT: CPT

## 2022-04-14 PROCEDURE — 85025 COMPLETE CBC W/AUTO DIFF WBC: CPT

## 2022-04-14 PROCEDURE — 6360000002 HC RX W HCPCS: Performed by: NURSE PRACTITIONER

## 2022-04-14 PROCEDURE — 80048 BASIC METABOLIC PNL TOTAL CA: CPT

## 2022-04-14 PROCEDURE — 71045 X-RAY EXAM CHEST 1 VIEW: CPT

## 2022-04-14 RX ORDER — IPRATROPIUM BROMIDE AND ALBUTEROL SULFATE 2.5; .5 MG/3ML; MG/3ML
3 SOLUTION RESPIRATORY (INHALATION) EVERY 4 HOURS PRN
Qty: 360 ML | Refills: 1 | Status: SHIPPED | OUTPATIENT
Start: 2022-04-14

## 2022-04-14 RX ORDER — PREDNISONE 20 MG/1
40 TABLET ORAL DAILY
Qty: 10 TABLET | Refills: 0 | Status: SHIPPED | OUTPATIENT
Start: 2022-04-14 | End: 2022-04-19

## 2022-04-14 RX ORDER — BUDESONIDE AND FORMOTEROL FUMARATE DIHYDRATE 160; 4.5 UG/1; UG/1
2 AEROSOL RESPIRATORY (INHALATION) 2 TIMES DAILY
Qty: 30.6 G | Refills: 1 | Status: SHIPPED | OUTPATIENT
Start: 2022-04-14

## 2022-04-14 RX ADMIN — INSULIN LISPRO 3 UNITS: 100 INJECTION, SOLUTION INTRAVENOUS; SUBCUTANEOUS at 13:11

## 2022-04-14 RX ADMIN — SERTRALINE 25 MG: 25 TABLET, FILM COATED ORAL at 09:47

## 2022-04-14 RX ADMIN — FINASTERIDE 5 MG: 5 TABLET, FILM COATED ORAL at 09:47

## 2022-04-14 RX ADMIN — METFORMIN HYDROCHLORIDE 850 MG: 850 TABLET ORAL at 10:10

## 2022-04-14 RX ADMIN — LATANOPROST 1 DROP: 50 SOLUTION OPHTHALMIC at 22:06

## 2022-04-14 RX ADMIN — BUDESONIDE 500 MCG: 0.5 SUSPENSION RESPIRATORY (INHALATION) at 20:28

## 2022-04-14 RX ADMIN — TAMSULOSIN HYDROCHLORIDE 0.8 MG: 0.4 CAPSULE ORAL at 09:47

## 2022-04-14 RX ADMIN — SODIUM CHLORIDE, PRESERVATIVE FREE 10 ML: 5 INJECTION INTRAVENOUS at 22:06

## 2022-04-14 RX ADMIN — INSULIN LISPRO 2 UNITS: 100 INJECTION, SOLUTION INTRAVENOUS; SUBCUTANEOUS at 09:49

## 2022-04-14 RX ADMIN — SODIUM CHLORIDE: 9 INJECTION, SOLUTION INTRAVENOUS at 16:05

## 2022-04-14 RX ADMIN — FLUTICASONE PROPIONATE 2 SPRAY: 50 SPRAY, METERED NASAL at 10:13

## 2022-04-14 RX ADMIN — Medication 2000 UNITS: at 09:46

## 2022-04-14 RX ADMIN — QUETIAPINE FUMARATE 25 MG: 25 TABLET ORAL at 22:13

## 2022-04-14 RX ADMIN — METFORMIN HYDROCHLORIDE 850 MG: 850 TABLET ORAL at 17:24

## 2022-04-14 RX ADMIN — INSULIN LISPRO 3 UNITS: 100 INJECTION, SOLUTION INTRAVENOUS; SUBCUTANEOUS at 22:13

## 2022-04-14 RX ADMIN — BUDESONIDE 500 MCG: 0.5 SUSPENSION RESPIRATORY (INHALATION) at 08:45

## 2022-04-14 RX ADMIN — METHYLPREDNISOLONE SODIUM SUCCINATE 40 MG: 40 INJECTION, POWDER, FOR SOLUTION INTRAMUSCULAR; INTRAVENOUS at 01:20

## 2022-04-14 RX ADMIN — INSULIN LISPRO 1 UNITS: 100 INJECTION, SOLUTION INTRAVENOUS; SUBCUTANEOUS at 17:25

## 2022-04-14 RX ADMIN — ENOXAPARIN SODIUM 40 MG: 100 INJECTION SUBCUTANEOUS at 09:46

## 2022-04-14 RX ADMIN — SODIUM CHLORIDE, PRESERVATIVE FREE 10 ML: 5 INJECTION INTRAVENOUS at 09:47

## 2022-04-14 RX ADMIN — CEFTRIAXONE SODIUM 1000 MG: 1 INJECTION, POWDER, FOR SOLUTION INTRAMUSCULAR; INTRAVENOUS at 16:04

## 2022-04-14 RX ADMIN — METHYLPREDNISOLONE SODIUM SUCCINATE 40 MG: 40 INJECTION, POWDER, FOR SOLUTION INTRAMUSCULAR; INTRAVENOUS at 17:25

## 2022-04-14 RX ADMIN — METHYLPREDNISOLONE SODIUM SUCCINATE 40 MG: 40 INJECTION, POWDER, FOR SOLUTION INTRAMUSCULAR; INTRAVENOUS at 09:46

## 2022-04-14 ASSESSMENT — PAIN SCALES - GENERAL
PAINLEVEL_OUTOF10: 0

## 2022-04-14 ASSESSMENT — ENCOUNTER SYMPTOMS
SHORTNESS OF BREATH: 1
ABDOMINAL DISTENTION: 0
WHEEZING: 0
COUGH: 1

## 2022-04-14 NOTE — PROGRESS NOTES
[facility]  Office: [address], DO, Billesequiel Richard, DO, Dawn Naida, DO, Cordelia Odonnell, DO, Gillian Gar MD, Allison Rodriguez MD, Edgard You MD, Victor Manuel Acosta MD, Melquiades Andrade MD, Lula Santana MD, Sophia Chowdhury MD, Nohemy Villanueva, DO, Ralph Rowley, DO, Ines Gonsales MD,  Mary Jay, DO, Sabas Lange MD, Radha Peres MD, Moni Avendano MD, Bertin Qiu DO, Joan Frias MD, Brooklyn Castro MD, Chapin Cortés [facility], [facility], CNP, Bill Elliott, CNP, Naila Niño, CNP, Aida Marie, CNP, Eddi Jordan, [facility], Scotty Bryant, CNP, Laura Lozada PA-C, Manolo Al, [place], Yung Castro, CNP, Lizet Monteiro, CNP, Caty Velasquez, CNP, Kristin Sanabria, CNS, Jeannie Moreland, [place], Blas Mock, CNP, Nohemi Hardy, CNP, Ashleigh Kong, [facility]         [facility]    Progress Note    4/14/2022    2:13 PM    Name:   Abel Collet  MRN:     [id number]     Arielle[place]lyside:      [de-identified]   Room:   [address] [place] [address] Day:  6  Admit Date:  4/8/2022  2:52 AM    PCP:   Yvonne García DO  Code Status:  Full Code    Subjective:     C/C:   Chief Complaint   Patient presents with    Altered Mental Status    Urinary Tract Infection     Interval History Status:nochange    Patient is feeling well  No shortness of breath, had some cough this morning  Saturations have improved to 4 L 97% saturation, weaning down  Continues to be bradycardic with stable, no dizziness  Blood pressure stable    Brief History:     47-year-old male with known medical history of chronic urinary retention, on chronic Worley catheter presents to the hospital for altered mental status. As per family patient has been acting more confused for last 2 days. Patient gets his Worley changed every month. Per chart review patient had cloudy urine and was acting very confused.   On my evaluation patient is oriented to place and self but not very cooperative in my history and exam.  States that he has pain in the scrotal area. Has chronic Worley in place. Patient was seen to have pneumothorax  Review of Systems:     Review of Systems   Constitutional: Negative for activity change and appetite change. HENT: Negative for congestion. Hard of hearing   Respiratory: Positive for cough and shortness of breath. Negative for wheezing. Cardiovascular: Negative for chest pain, palpitations and leg swelling. Gastrointestinal: Negative for abdominal distention. Genitourinary: Positive for difficulty urinating. Negative for flank pain. Musculoskeletal: Negative for arthralgias and joint swelling. Neurological: Negative for dizziness and facial asymmetry. Psychiatric/Behavioral: Negative for agitation and behavioral problems. Medications:      Allergies:  No Known Allergies    Current Meds:   Scheduled Meds:    sodium chloride  100 mL IntraVENous Once    methylPREDNISolone  40 mg IntraVENous Q8H    budesonide  0.5 mg Nebulization BID    insulin lispro  0-6 Units SubCUTAneous TID     insulin lispro  0-3 Units SubCUTAneous Nightly    fluticasone  2 spray Each Nostril Daily    cefTRIAXone (ROCEPHIN) IV  1,000 mg IntraVENous Q24H    lidocaine  5 mL Topical Once    latanoprost  1 drop Right Eye Nightly    tamsulosin  0.8 mg Oral Daily    metFORMIN  850 mg Oral BID     Vitamin D  2,000 Units Oral Daily    sertraline  25 mg Oral Daily    sodium chloride flush  5-40 mL IntraVENous 2 times per day    enoxaparin  40 mg SubCUTAneous Daily    nicotine  1 patch TransDERmal Daily    finasteride  5 mg Oral Daily     Continuous Infusions:    sodium chloride 50 mL/hr at 04/14/22 0600    dextrose      sodium chloride Stopped (04/11/22 1654)     PRN Meds: sodium chloride flush, ipratropium-albuterol, glucagon (rDNA), dextrose, dextrose bolus (hypoglycemia) **OR** dextrose bolus (hypoglycemia), glucose, QUEtiapine, potassium chloride **OR** potassium alternative oral replacement **OR** potassium chloride, sodium chloride flush, sodium chloride, ondansetron **OR** ondansetron, acetaminophen **OR** acetaminophen, polyethylene glycol, oxyCODONE-acetaminophen    Data:     Past Medical History:   has a past medical history of Hip fracture ([address] Social History:   reports that he has been smoking cigarettes. He has a 60.00 pack-year smoking history. He has never used smokeless tobacco. He reports current drug use. Drug: Marijuana Elfida Stephen). He reports that he does not drink alcohol. Marijuana use is infrequent    Family History:   Family History   Problem Relation Age of Onset    High Blood Pressure Mother     Heart Disease Mother     Kidney Disease Mother     Other Sister         obesity       Vitals:  /85   Pulse 56   Temp 98.2 °F (36.8 °C) (Temporal)   Resp 21   Ht 5' 11\" (1.803 m)   Wt 131 lb 8 oz (59.6 kg)   SpO2 97%   BMI 18.34 kg/m²   Temp (24hrs), Av.9 °F (36.6 °C), Min:97.5 °F (36.4 °C), Max:98.2 °F (36.8 °C)    Recent Labs     22  1640 22  2003 22  0745 22  1217   POCGLU 286* 214* 222* 265*       I/O (24Hr):     Intake/Output Summary (Last 24 hours) at 2022 1413  Last data filed at 2022 0600  Gross per 24 hour   Intake 1244.05 ml   Output 1700 ml   Net -455.95 ml       Labs:  Hematology:  Recent Labs     22  0403   WBC 14.3*   RBC 4.05*   HGB 12.2*   HCT 37.3*   MCV 92.1   MCH 30.1   MCHC 32.7   RDW 14.1      MPV 10.0     Chemistry:  Recent Labs     22  0704 22  0403   NA  --  137   K  --  4.4   CL  --  104   CO2  --  23   GLUCOSE  --  238*   BUN  --  33*   CREATININE  --  0.73   ANIONGAP  --  10   LABGLOM  --  >60   GFRAA  --  >60   CALCIUM  --  8.5*   PROBNP 125  --      Recent Labs     22  2105 22  0729 22  1124 22  1640 22  0745 22  1217   LABA1C 7.8*  --   --   --   --   --   --   --    POCGLU  --    < > 201* 187* 286* 214* 222* 265*    < > = values in this interval not displayed. ABG:  Lab Results   Component Value Date    POCPH 7.458 04/11/2022    POCPCO2 34.3 04/11/2022    POCPO2 49.9 04/11/2022    POCHCO3 24.3 04/11/2022    PBEA 1 04/11/2022    LWNG7QOM 87 04/11/2022    FIO2 50.0 04/11/2022     Lab Results   Component Value Date/Time    SPECIAL R FA 8ML 04/08/2022 04:20 AM     Lab Results   Component Value Date/Time    CULTURE NO GROWTH 5 DAYS 04/08/2022 04:20 AM    CULTURE SERRATIA MARCESCENS >096432 CFU/ML (A) 04/08/2022 04:20 AM       Radiology:  CT HEAD WO CONTRAST    Result Date: 4/8/2022  1. No acute intracranial abnormality. 2. Mild-to-moderate global parenchymal volume loss with chronic microvascular ischemic changes. 3. Scattered atherosclerosis of the intracranial vasculature. Physical Examination:        General appearance: confused, awake, alert  Mental Status: Not oriented to place, oriented to self, time, could tell me the president's name  Lungs:  decr breath sounds at bases, no wheezing  Heart: Bradycardic heart rate in 50s, asymptomatic, no murmur  Abdomen:  soft, nontender, nondistended, normal bowel sounds, no masses, hepatomegaly, splenomegaly, modi in place  Extremities:  no edema, redness, tenderness in the calves  Skin:  no gross lesions, rashes, induration    Assessment:        Hospital Problems           Last Modified POA    * (Principal) UTI (urinary tract infection) 4/8/2022 Yes    Type 2 diabetes mellitus, without long-term current use of insulin ([facility] 4/8/2022 Yes    Essential hypertension 4/8/2022 Yes    Advanced open-angle glaucoma 4/8/2022 Yes    Benign non-nodular prostatic hyperplasia with lower urinary tract symptoms 4/8/2022 Yes    Overview Signed 8/27/2021  1:08 PM by Neyda Hilton DO     Formatting of this note might be different from the original.  luts - discussed options. He has cut down some on caffeine but not completely.   His symptoms are somewhat improved. He is not bothered by them at this point and because of this does not want any further intervention. Moderate protein-calorie malnutrition ([address] 4/8/2022 Yes    Toxic metabolic encephalopathy 0/4/1845 Yes    Tobacco abuse 4/8/2022 Yes    Acute respiratory failure with hypoxia ([address] 4/10/2022 Yes    Pulmonary emphysema ([address] 4/13/2022 Yes    COPD exacerbation ([address] 4/13/2022 Yes    Secondary spontaneous pneumothorax 4/13/2022 Yes          Plan:        Pneumothorax-chest x-ray shows resolution of pneumothorax, patient is cleared from pulmonology for discharge, nasal cannula oxygen, 4 L saturating 97%, wean down as tolerated    Acute respiratory failure-secondary to COPD exacerbation-CT PE scan was done which did not show any pulmonary embolism but did show changes of emphysema. Continue breathing treatment, continue Symbicort, continue Rocephin for total of 7 days. On solumedrol. Plan to discharge on prednisone for 5 days, Symbicort and DuoNeb nebulizations. Toxic metabolic encephalopathy with UTI-UTI likely secondary to chronic Worley,culture growing serratia, on rocephin, sensitive, blood cultures negative for now. Mentation improving. Neuro signed off. Last dose of Rocephin tomorrow. Concern for aspiration- swallow study done, recommend regular diet with honey thick liquid    BPH-continue finasteride and Flomax, follows up with urology outpatient     Type 2 diabetes mellitus-on Metformin, POCT blood sugar checks at meals and at bedtime.     Hypertension with bradycardia- bp stable off medications, avoid hypotension     Replace potassium    Nicotine patch for tobacco abuse     Pt/ot eval    Patient is stable for discharge, discharge order placed, pending placement. ARIANA signed.     Rafat Dalton MD  4/14/2022  2:13 PM

## 2022-04-14 NOTE — CARE COORDINATION
[facility] [employer] are unable to [facility]. VM left for Brandi/thalia for more choices. Await call back.

## 2022-04-14 NOTE — PROGRESS NOTES
Occupational Therapy  DATE: 2022    NAME: Vesta Alexander  MRN: [id number]   : 1940    Patient not seen this date for Occupational Therapy due to:      [] Cancel by RN or physician due to:    [] Hemodialysis    [] Critical Lab Value Level     [] Blood transfusion in progress    [] Acute or unstable cardiovascular status   _MAP < 55 or more than >115  _HR < 40 or > 130    [] Acute or unstable pulmonary status   -FiO2 > 60%   _RR < 5 or >40    _O2 sats < 85%    [] Strict Bedrest    [] Off Unit for surgery or procedure    [] Off Unit for testing       [] Pending imaging to R/O fracture    [x] Refusal by Patient : Pt. Declined treatment in a.m. stating \"I can't do it now how about later\". Pt. Misbah Goes on in p.m. and declined treatment again stating \"This parade needs to be over\". OT will consult with nursing prior to next visit. [] Other      []OT being discontinued at this time. Patient independent. No further needs. []OT being discontinued at this time as the patient has been transferred to hospice care. No further needs.       CARLO Hillman

## 2022-04-14 NOTE — PROGRESS NOTES
DATE: 2022    NAME: Cari Crews  MRN: [id number]   : 1940    Patient not seen this date for Physical Therapy due to:      [] Cancel by RN or physician due to:    [] Hemodialysis    [] Critical Lab Value Level     [] Blood transfusion in progress    [] Acute or unstable cardiovascular status   _MAP < 55 or more than >115  _HR < 40 or > 130    [] Acute or unstable pulmonary status   -FiO2 > 60%   _RR < 5 or >40    _O2 sats < 85%    [] Strict Bedrest    [] Off Unit for surgery or procedure    [] Off Unit for testing       [] Pending imaging to R/O fracture    [x] Refusal by Patient Pt wants to rest     [] Other      [] PT being discontinued at this time. Patient independent. No further needs. [] PT being discontinued at this time as the patient has been transferred to hospice care. No further needs.       SHAY FLORES, PTA

## 2022-04-14 NOTE — PLAN OF CARE
Problem: Falls - Risk of:  Goal: Will remain free from falls  Description: Will remain free from falls  Outcome: Ongoing     Problem: Falls - Risk of:  Goal: Absence of physical injury  Description: Absence of physical injury  Outcome: Ongoing     Problem: Skin Integrity:  Goal: Will show no infection signs and symptoms  Description: Will show no infection signs and symptoms  Outcome: Ongoing     Problem: Skin Integrity:  Goal: Absence of new skin breakdown  Description: Absence of new skin breakdown  Outcome: Ongoing     Problem: Sensory:  Goal: General experience of comfort will improve  Description: General experience of comfort will improve  Outcome: Ongoing     Problem: Safety:  Goal: Ability to remain free from injury will improve  Description: Ability to remain free from injury will improve  Outcome: Ongoing

## 2022-04-14 NOTE — PROGRESS NOTES
[facility]  Speech Language Pathology    Date: 4/14/2022  Patient Name: Piyush Colunga  YOB: 1940   AGE: [de-identified] y.o. MRN: [id number]        Patient Not Available for Speech Therapy     Due to:  [] Testing  [] Hemodialysis  [] Cancelled by RN  [] Surgery   [] Intubation/Sedation/Pain Medication  [] Medical instability  [x] Other: Pt refuses to participate in [facility] for dysphagia despite max cues, encouragement, and education.      Next scheduled treatment: 4/15/22 as appropriate  Completed by: MICHELLE Cochran, M.S. [id number] [facility]

## 2022-04-14 NOTE — PROGRESS NOTES
Dr. Segundo Salvaodr at bedside discussing treatment plans with patient, all questions answered, pt satisfied.

## 2022-04-14 NOTE — PROGRESS NOTES
Pulmonary Critical Care Progress Note  Nely Mckeon MD     Patient seen for the follow up of continuous pneumothorax, COPD exacerbation    Subjective:  Patient is resting in bed. He denies chest pain he has mild cough. He is on oxygen by nasal cannula. He complains of a dry nose. He is tolerating dysphagia diet. His x-ray this morning does not show any appreciable pneumothorax to my interpretation. Examination:  Vitals: /85   Pulse 56   Temp 98.2 °F (36.8 °C) (Temporal)   Resp 21   Ht 5' 11\" (1.803 m)   Wt 131 lb 8 oz (59.6 kg)   SpO2 97%   BMI 18.34 kg/m²   General appearance: alert and cooperative with exam, resting comfortably in bed  Neck: No JVD  Lungs: Mild wheeze, moderate air exchange  Heart: regular rate and rhythm, S1, S2 normal, no gallop  Abdomen: Soft, non tender, + BS  Extremities: no cyanosis or clubbing.  No significant edema    LABs:  CBC:   Recent Labs     04/14/22  0403   WBC 14.3*   HGB 12.2*   HCT 37.3*        BMP:   Recent Labs     04/14/22  0403      K 4.4   CO2 23   BUN 33*   CREATININE 0.73   LABGLOM >60   GLUCOSE 238*     ABG:  Lab Results   Component Value Date    FIO2 50.0 04/11/2022       Lab Results   Component Value Date    POCPH 7.458 04/11/2022    POCPCO2 34.3 04/11/2022    POCPO2 49.9 04/11/2022    POCHCO3 24.3 04/11/2022    PBEA 1 04/11/2022    ZVNK3PMF 87 04/11/2022    FIO2 50.0 04/11/2022     Radiology:  X-ray chest 4/14/2022      Impression:  · Acute hypoxic respiratory insufficiency  · Acute exacerbation of COPD/emphysema  · Spontaneous right pneumothorax  · Atelectasis/basilar consolidation, possible aspiration  · Active smoking  · Elevated D-dimer  · Encephalopathy, improved  · UTI with indwelling Worley  · Compression fracture at T5  · Legally blind    Recommendations:  · Oxygen via nasal cannula  · DuoNeb via nebulizer  · Pulmicort every 12 hours via nebulizer  · IV Solu-Medrol 40 mg every 8 hours  · IV Rocephin  · Lower extremity Dopplers negative  · Swallow evaluation  · Urology following  · Smoking cessation/nicotine patch  · X-ray chest in a.m.  · DVT prophylaxis, Lovenox  · We will follow with you    Mearl Mortimer, MD, [facility]  Pulmonary Critical Care and Sleep Medicine,  [facility]  Cell: [phone]  Office: [phone]

## 2022-04-14 NOTE — CARE COORDINATION
Social work: [facility] is out of network. [facility] [address] can accommodate and will start precert. SAMIRA started.

## 2022-04-14 NOTE — RT PROTOCOL NOTE
RT Inhaler-Nebulizer Bronchodilator Protocol Note    There is a bronchodilator order in the chart from a provider indicating to follow the RT Bronchodilator Protocol and there is an Initiate RT Inhaler-Nebulizer Bronchodilator Protocol order as well (see protocol at bottom of note). CXR Findings:  XR CHEST PORTABLE    Result Date: 4/14/2022  Stable exam since yesterday. Minor chronic appearing interstitial prominence. Possible mild emphysema. XR CHEST PORTABLE    Result Date: 4/13/2022  Stable appearance of a tiny right apical pneumothorax     XR CHEST PORTABLE    Result Date: 4/12/2022  Slightly smaller apical pneumothorax. No other interval change. The findings from the last RT Protocol Assessment were as follows:   History Pulmonary Disease: Smoker 15 pack years or more  Respiratory Pattern: Regular pattern and RR 12-20 bpm  Breath Sounds: Slightly diminished and/or crackles  Cough: Strong, spontaneous, non-productive  Indication for Bronchodilator Therapy: Decreased or absent breath sounds  Bronchodilator Assessment Score: 3    Aerosolized bronchodilator medication orders have been revised according to the RT Inhaler-Nebulizer Bronchodilator Protocol below. Respiratory Therapist to perform RT Therapy Protocol Assessment initially then follow the protocol. Repeat RT Therapy Protocol Assessment PRN for score 0-3 or on second treatment, BID, and PRN for scores above 3. No Indications - adjust the frequency to every 6 hours PRN wheezing or bronchospasm, if no treatments needed after 48 hours then discontinue using Per Protocol order mode. If indication present, adjust the RT bronchodilator orders based on the Bronchodilator Assessment Score as indicated below.   Use Inhaler orders unless patient has one or more of the following: on home nebulizer, not able to hold breath for 10 seconds, is not alert and oriented, cannot activate and use MDI correctly, or respiratory rate 25 breaths per minute or more, then use the equivalent nebulizer order(s) with same Frequency and PRN reasons based on the score. If a patient is on this medication at home then do not decrease Frequency below that used at home. 0-3 - enter or revise RT bronchodilator order(s) to equivalent RT Bronchodilator order with Frequency of every 4 hours PRN for wheezing or increased work of breathing using Per Protocol order mode. 4-6 - enter or revise RT Bronchodilator order(s) to two equivalent RT bronchodilator orders with one order with BID Frequency and one order with Frequency of every 4 hours PRN wheezing or increased work of breathing using Per Protocol order mode. 7-10 - enter or revise RT Bronchodilator order(s) to two equivalent RT bronchodilator orders with one order with TID Frequency and one order with Frequency of every 4 hours PRN wheezing or increased work of breathing using Per Protocol order mode. 11-13 - enter or revise RT Bronchodilator order(s) to one equivalent RT bronchodilator order with QID Frequency and an Albuterol order with Frequency of every 4 hours PRN wheezing or increased work of breathing using Per Protocol order mode. Greater than 13 - enter or revise RT Bronchodilator order(s) to one equivalent RT bronchodilator order with every 4 hours Frequency and an Albuterol order with Frequency of every 2 hours PRN wheezing or increased work of breathing using Per Protocol order mode. RT to enter RT Home Evaluation for COPD & MDI Assessment order using Per Protocol order mode.     Electronically signed by Karina Koch RCP on 4/14/2022 at 8:53 AM

## 2022-04-14 NOTE — CARE COORDINATION
Social work: Spoke with Brandi/thalia regarding SNF choices. She gave another choice of 1. [facility] and 2. [facility]. HENS started. Will need precert.

## 2022-04-14 NOTE — PLAN OF CARE
Problem: Falls - Risk of:  Goal: Will remain free from falls  Description: Will remain free from falls  4/13/2022 2141 by Gayatri Downey RN  Outcome: Ongoing     Problem: Falls - Risk of:  Goal: Absence of physical injury  Description: Absence of physical injury  4/13/2022 2141 by Gayatri Downey RN  Outcome: Ongoing     Problem: Skin Integrity:  Goal: Will show no infection signs and symptoms  Description: Will show no infection signs and symptoms  4/13/2022 2141 by Gayatri Downey RN  Outcome: Ongoing     Problem: Sensory:  Goal: General experience of comfort will improve  Description: General experience of comfort will improve  4/13/2022 2141 by Gayatri Downey RN  Outcome: Ongoing     Problem: Urinary Elimination:  Goal: Signs and symptoms of infection will decrease  Description: Signs and symptoms of infection will decrease  4/13/2022 2141 by Gayatri Downey RN  Outcome: Ongoing     Problem: Coping:  Goal: Ability to remain calm will improve  Description: Ability to remain calm will improve  4/13/2022 2141 by Gayatri Downey RN  Outcome: Ongoing     Problem: Safety:  Goal: Ability to remain free from injury will improve  Description: Ability to remain free from injury will improve  4/13/2022 2141 by Gayatri Downey RN  Outcome: Ongoing

## 2022-04-15 ENCOUNTER — APPOINTMENT (OUTPATIENT)
Dept: GENERAL RADIOLOGY | Age: 82
DRG: 698 | End: 2022-04-15
Payer: MEDICARE

## 2022-04-15 LAB
ANION GAP SERPL CALCULATED.3IONS-SCNC: 9 MMOL/L (ref 9–17)
BUN BLDV-MCNC: 25 MG/DL (ref 8–23)
BUN/CREAT BLD: 40 (ref 9–20)
CALCIUM SERPL-MCNC: 8 MG/DL (ref 8.6–10.4)
CHLORIDE BLD-SCNC: 101 MMOL/L (ref 98–107)
CO2: 25 MMOL/L (ref 20–31)
CREAT SERPL-MCNC: 0.62 MG/DL (ref 0.7–1.2)
EKG ATRIAL RATE: 129 BPM
EKG Q-T INTERVAL: 336 MS
EKG QRS DURATION: 82 MS
EKG QTC CALCULATION (BAZETT): 440 MS
EKG R AXIS: -92 DEGREES
EKG T AXIS: 73 DEGREES
EKG VENTRICULAR RATE: 103 BPM
GFR AFRICAN AMERICAN: >60 ML/MIN
GFR NON-AFRICAN AMERICAN: >60 ML/MIN
GFR SERPL CREATININE-BSD FRML MDRD: ABNORMAL ML/MIN/{1.73_M2}
GLUCOSE BLD-MCNC: 183 MG/DL (ref 75–110)
GLUCOSE BLD-MCNC: 210 MG/DL (ref 75–110)
GLUCOSE BLD-MCNC: 259 MG/DL (ref 75–110)
GLUCOSE BLD-MCNC: 297 MG/DL (ref 75–110)
GLUCOSE BLD-MCNC: 317 MG/DL (ref 70–99)
HCT VFR BLD CALC: 41.1 % (ref 40.7–50.3)
HEMOGLOBIN: 13.2 G/DL (ref 13–17)
LV EF: 58 %
LVEF MODALITY: NORMAL
MCH RBC QN AUTO: 29.7 PG (ref 25.2–33.5)
MCHC RBC AUTO-ENTMCNC: 32.1 G/DL (ref 28.4–34.8)
MCV RBC AUTO: 92.6 FL (ref 82.6–102.9)
NRBC AUTOMATED: 0 PER 100 WBC
PDW BLD-RTO: 13.8 % (ref 11.8–14.4)
PLATELET # BLD: 294 K/UL (ref 138–453)
PMV BLD AUTO: 9 FL (ref 8.1–13.5)
POTASSIUM SERPL-SCNC: 3.8 MMOL/L (ref 3.7–5.3)
RBC # BLD: 4.44 M/UL (ref 4.21–5.77)
SODIUM BLD-SCNC: 135 MMOL/L (ref 135–144)
WBC # BLD: 10.9 K/UL (ref 3.5–11.3)

## 2022-04-15 PROCEDURE — 93306 TTE W/DOPPLER COMPLETE: CPT

## 2022-04-15 PROCEDURE — 6360000002 HC RX W HCPCS: Performed by: STUDENT IN AN ORGANIZED HEALTH CARE EDUCATION/TRAINING PROGRAM

## 2022-04-15 PROCEDURE — 93005 ELECTROCARDIOGRAM TRACING: CPT | Performed by: FAMILY MEDICINE

## 2022-04-15 PROCEDURE — 2580000003 HC RX 258: Performed by: NURSE PRACTITIONER

## 2022-04-15 PROCEDURE — 94761 N-INVAS EAR/PLS OXIMETRY MLT: CPT

## 2022-04-15 PROCEDURE — 99232 SBSQ HOSP IP/OBS MODERATE 35: CPT | Performed by: FAMILY MEDICINE

## 2022-04-15 PROCEDURE — 6360000002 HC RX W HCPCS: Performed by: INTERNAL MEDICINE

## 2022-04-15 PROCEDURE — 94640 AIRWAY INHALATION TREATMENT: CPT

## 2022-04-15 PROCEDURE — 6370000000 HC RX 637 (ALT 250 FOR IP): Performed by: NURSE PRACTITIONER

## 2022-04-15 PROCEDURE — 2580000003 HC RX 258: Performed by: STUDENT IN AN ORGANIZED HEALTH CARE EDUCATION/TRAINING PROGRAM

## 2022-04-15 PROCEDURE — 97110 THERAPEUTIC EXERCISES: CPT

## 2022-04-15 PROCEDURE — 2580000003 HC RX 258: Performed by: INTERNAL MEDICINE

## 2022-04-15 PROCEDURE — 92526 ORAL FUNCTION THERAPY: CPT

## 2022-04-15 PROCEDURE — 82947 ASSAY GLUCOSE BLOOD QUANT: CPT

## 2022-04-15 PROCEDURE — 6360000002 HC RX W HCPCS: Performed by: NURSE PRACTITIONER

## 2022-04-15 PROCEDURE — 80048 BASIC METABOLIC PNL TOTAL CA: CPT

## 2022-04-15 PROCEDURE — 6370000000 HC RX 637 (ALT 250 FOR IP): Performed by: STUDENT IN AN ORGANIZED HEALTH CARE EDUCATION/TRAINING PROGRAM

## 2022-04-15 PROCEDURE — 2700000000 HC OXYGEN THERAPY PER DAY

## 2022-04-15 PROCEDURE — 97530 THERAPEUTIC ACTIVITIES: CPT

## 2022-04-15 PROCEDURE — 36415 COLL VENOUS BLD VENIPUNCTURE: CPT

## 2022-04-15 PROCEDURE — 93010 ELECTROCARDIOGRAM REPORT: CPT | Performed by: INTERNAL MEDICINE

## 2022-04-15 PROCEDURE — 71045 X-RAY EXAM CHEST 1 VIEW: CPT

## 2022-04-15 PROCEDURE — 2060000000 HC ICU INTERMEDIATE R&B

## 2022-04-15 PROCEDURE — 85027 COMPLETE CBC AUTOMATED: CPT

## 2022-04-15 PROCEDURE — 2580000003 HC RX 258: Performed by: FAMILY MEDICINE

## 2022-04-15 PROCEDURE — 97535 SELF CARE MNGMENT TRAINING: CPT

## 2022-04-15 RX ORDER — PREDNISONE 20 MG/1
40 TABLET ORAL DAILY
Status: DISCONTINUED | OUTPATIENT
Start: 2022-04-15 | End: 2022-04-16 | Stop reason: HOSPADM

## 2022-04-15 RX ORDER — DIGOXIN 0.25 MG/ML
250 INJECTION INTRAMUSCULAR; INTRAVENOUS
Status: COMPLETED | OUTPATIENT
Start: 2022-04-15 | End: 2022-04-15

## 2022-04-15 RX ORDER — 0.9 % SODIUM CHLORIDE 0.9 %
1000 INTRAVENOUS SOLUTION INTRAVENOUS ONCE
Status: COMPLETED | OUTPATIENT
Start: 2022-04-15 | End: 2022-04-15

## 2022-04-15 RX ADMIN — SERTRALINE 25 MG: 25 TABLET, FILM COATED ORAL at 10:16

## 2022-04-15 RX ADMIN — SODIUM CHLORIDE: 9 INJECTION, SOLUTION INTRAVENOUS at 12:49

## 2022-04-15 RX ADMIN — METHYLPREDNISOLONE SODIUM SUCCINATE 40 MG: 40 INJECTION, POWDER, FOR SOLUTION INTRAMUSCULAR; INTRAVENOUS at 10:18

## 2022-04-15 RX ADMIN — INSULIN LISPRO 2 UNITS: 100 INJECTION, SOLUTION INTRAVENOUS; SUBCUTANEOUS at 21:36

## 2022-04-15 RX ADMIN — INSULIN LISPRO 3 UNITS: 100 INJECTION, SOLUTION INTRAVENOUS; SUBCUTANEOUS at 12:51

## 2022-04-15 RX ADMIN — METFORMIN HYDROCHLORIDE 850 MG: 850 TABLET ORAL at 17:12

## 2022-04-15 RX ADMIN — PREDNISONE 40 MG: 20 TABLET ORAL at 15:57

## 2022-04-15 RX ADMIN — DIGOXIN 250 MCG: 250 INJECTION, SOLUTION INTRAMUSCULAR; INTRAVENOUS; PARENTERAL at 15:40

## 2022-04-15 RX ADMIN — METFORMIN HYDROCHLORIDE 850 MG: 850 TABLET ORAL at 10:16

## 2022-04-15 RX ADMIN — DIGOXIN 250 MCG: 250 INJECTION, SOLUTION INTRAMUSCULAR; INTRAVENOUS; PARENTERAL at 15:55

## 2022-04-15 RX ADMIN — LATANOPROST 1 DROP: 50 SOLUTION OPHTHALMIC at 21:39

## 2022-04-15 RX ADMIN — SODIUM CHLORIDE 1000 ML: 9 INJECTION, SOLUTION INTRAVENOUS at 10:42

## 2022-04-15 RX ADMIN — TAMSULOSIN HYDROCHLORIDE 0.8 MG: 0.4 CAPSULE ORAL at 10:16

## 2022-04-15 RX ADMIN — ENOXAPARIN SODIUM 40 MG: 100 INJECTION SUBCUTANEOUS at 10:16

## 2022-04-15 RX ADMIN — METHYLPREDNISOLONE SODIUM SUCCINATE 40 MG: 40 INJECTION, POWDER, FOR SOLUTION INTRAMUSCULAR; INTRAVENOUS at 01:34

## 2022-04-15 RX ADMIN — INSULIN LISPRO 1 UNITS: 100 INJECTION, SOLUTION INTRAVENOUS; SUBCUTANEOUS at 17:11

## 2022-04-15 RX ADMIN — INSULIN LISPRO 2 UNITS: 100 INJECTION, SOLUTION INTRAVENOUS; SUBCUTANEOUS at 10:23

## 2022-04-15 RX ADMIN — SODIUM CHLORIDE, PRESERVATIVE FREE 10 ML: 5 INJECTION INTRAVENOUS at 10:16

## 2022-04-15 RX ADMIN — CEFTRIAXONE SODIUM 1000 MG: 1 INJECTION, POWDER, FOR SOLUTION INTRAMUSCULAR; INTRAVENOUS at 15:35

## 2022-04-15 RX ADMIN — FINASTERIDE 5 MG: 5 TABLET, FILM COATED ORAL at 12:50

## 2022-04-15 RX ADMIN — Medication 2000 UNITS: at 10:16

## 2022-04-15 ASSESSMENT — ENCOUNTER SYMPTOMS
RHINORRHEA: 0
BACK PAIN: 0
NAUSEA: 0
COUGH: 0
CHEST TIGHTNESS: 0
CONSTIPATION: 0
SINUS PRESSURE: 0
VOICE CHANGE: 0
WHEEZING: 0
VOMITING: 0
SHORTNESS OF BREATH: 0
DIARRHEA: 0
CHOKING: 0
ABDOMINAL PAIN: 0

## 2022-04-15 ASSESSMENT — PAIN SCALES - GENERAL
PAINLEVEL_OUTOF10: 0

## 2022-04-15 NOTE — PROGRESS NOTES
New orders received for cardiology consult. Dr. Yung Aldana notified, orders received for 0.25mg IV Digoxin now then q15min x2 doses.      1st dose given at 1540: Apical pulse taken (82bpm)    2nd dose given at 1555: Apical pulse taken (96bpm)

## 2022-04-15 NOTE — PROGRESS NOTES
[facility]  Office: [address], DO, Pedro Wisemans, DO, Gladys Peñaint, DO, Harrison Vivar Blood, DO, Marjan Richard MD, Shonda Mcdowell MD, Yanet Farnsworth MD, Tonya Lala MD, Charley Jackson MD, Caroline Allison MD, Tia Ellsworth MD, Lamonte Cooks, DO, Char Agosto, DO, Lisa Matthews MD,  Cortes Allen, DO, Eva Malik MD, Rose Flores MD, Angeli Jenkins MD, Jeremy Dakins, DO, Kimmie Cee MD, Dinh Harris MD, Yordy Rivas [facility], CNP, Viktoria Rosario, CNP, Ashley Anderson, CNP, Anastacio Miller, CNP, Cinthya Palomino, CNP, Salome Cortez PA-C, Erasmo Guido [facility], Annmarie Jay, CNP, Casandra Escalona, CNP, Silvio Murphy, CNP, David Patel, CNS, Christal Webb, [facility], Jeanette Taylor, CNP, Lina Marie, CNP, John Young, [facility] - INPATIENT      Daily Progress Note     Admit Date: 4/8/2022  Bed/Room No.  1107/1107-01  Admitting Physician : Yanet Farnsworth MD  Code Status :Full Code  Hospital Day:  LOS: 7 days   Chief Complaint:     Chief Complaint   Patient presents with    Altered Mental Status    Urinary Tract Infection     Principal Problem:    UTI (urinary tract infection)  Active Problems:    Type 2 diabetes mellitus, without long-term current use of insulin ([address]    Essential hypertension    Advanced open-angle glaucoma    Benign non-nodular prostatic hyperplasia with lower urinary tract symptoms    Moderate protein-calorie malnutrition ([address]    Toxic metabolic encephalopathy    Tobacco abuse    Acute respiratory failure with hypoxia ([address]    Pulmonary emphysema ([address]    COPD exacerbation ([address]    Secondary spontaneous pneumothorax  Resolved Problems:    * No resolved hospital problems. *    Subjective : Interval History/Significant events :  04/15/22    Patient is having tachycardia with heart rate of 130-140. Crystal Kate Patient denies any dizziness, lightheadedness, nausea, vomiting, chest pain, cough, expectoration.   Patient is not pleased and wants to be left alone and not bothered. He is asymptomatic. Blood pressure is low. Patient remains on 3 L of oxygen by nasal cannula. He is alert, oriented. Vitals - Unstable afebrile  Labs -hyperglycemia 303, leukocytosis 14.3    Nursing notes , Consults notes reviewed. Overnight events and updates discussed with Nursing staff . Background History:         Bhavna Mckenzie is [de-identified] y.o. male  Who was admitted to the hospital on 4/8/2022 for treatment of UTI (urinary tract infection). Patient came to hospital with altered mental status. Family found patient more confused for 2 days. He has history of chronic urinary retention with indwelling Worley catheter. Patient has his catheter changed every month. On initial evaluation patient was oriented to place but uncooperative. He also reported pain in scrotal area. Patient has history of COPD and had been started on O2 supplement recently for chronic respiratory failure. He is current smoker. Patient is diabetic and takes Metformin. Patient was started on antibiotics for pneumonia. He had swallow study which showed laryngeal penetration without aspiration. Patient was started modified dysphagia diet. Patient had pneumothorax on right apical on 4/12/2022 treated conservatively.     PMH:  Past Medical History:   Diagnosis Date    Hip fracture (HCC)       Allergies: No Known Allergies   Medications :  sodium chloride, 100 mL, IntraVENous, Once  methylPREDNISolone, 40 mg, IntraVENous, Q8H  budesonide, 0.5 mg, Nebulization, BID  insulin lispro, 0-6 Units, SubCUTAneous, TID WC  insulin lispro, 0-3 Units, SubCUTAneous, Nightly  fluticasone, 2 spray, Each Nostril, Daily  cefTRIAXone (ROCEPHIN) IV, 1,000 mg, IntraVENous, Q24H  lidocaine, 5 mL, Topical, Once  latanoprost, 1 drop, Right Eye, Nightly  tamsulosin, 0.8 mg, Oral, Daily  metFORMIN, 850 mg, Oral, BID WC  Vitamin D, 2,000 Units, Oral, Daily  sertraline, 25 mg, Oral, Daily  sodium chloride flush, 5-40 mL, IntraVENous, 2 times per day  enoxaparin, 40 mg, SubCUTAneous, Daily  nicotine, 1 patch, TransDERmal, Daily  finasteride, 5 mg, Oral, Daily        Review of Systems   Review of Systems   Constitutional: Positive for fatigue. Negative for activity change, appetite change, fever and unexpected weight change. HENT: Negative for congestion, nosebleeds, rhinorrhea, sinus pressure, sneezing and voice change. Respiratory: Negative for cough, choking, chest tightness, shortness of breath and wheezing. Cardiovascular: Negative for chest pain, palpitations and leg swelling. Gastrointestinal: Negative for abdominal pain, constipation, diarrhea, nausea and vomiting. Genitourinary: Positive for difficulty urinating. Negative for dysuria, frequency, penile discharge and testicular pain. Musculoskeletal: Negative for back pain. Skin: Negative for rash. Neurological: Negative for dizziness, weakness, light-headedness and headaches. Hematological: Does not bruise/bleed easily. Psychiatric/Behavioral: Negative for agitation, behavioral problems, confusion, self-injury, sleep disturbance and suicidal ideas.      Objective :      Current Vitals : Temp: 97.6 °F (36.4 °C),  Pulse: (!) 45, Resp: 17, BP: 131/74, SpO2: 99 %  Last 24 Hrs Vitals   Patient Vitals for the past 24 hrs:   BP Temp Temp src Pulse Resp SpO2 Height   04/15/22 0400 131/74 97.6 °F (36.4 °C) Temporal (!) 45 17 99 % --   04/15/22 0000 (!) 140/90 98 °F (36.7 °C) Temporal 61 17 97 % --   04/14/22 2300 130/66 -- -- 52 18 95 % --   04/14/22 2230 134/81 -- -- 63 18 93 % --   04/14/22 2027 -- -- -- -- 26 92 % --   04/14/22 2010 125/80 98.6 °F (37 °C) Temporal 84 18 92 % --   04/14/22 1619 -- 98.7 °F (37.1 °C) Temporal -- -- -- --   04/14/22 1600 -- -- -- -- -- 98 % --   04/14/22 1315 -- -- -- -- -- -- 5' 11\" (1.803 m)   04/14/22 1200 -- 98.8 °F (37.1 °C) Temporal -- -- -- --   04/14/22 0845 -- -- -- -- 21 97 % --   04/14/22 0835 -- 98.2 °F (36.8 °C) Temporal -- -- -- --     Intake / output   04/13 1901 - 04/15 0700  In: 1544.1 [P.O.:300; I.V.:1144.5]  Out: 1669 [Urine:3425]  Physical Exam:  Physical Exam  Vitals and nursing note reviewed. Constitutional:       General: He is not in acute distress. Appearance: He is not diaphoretic. Interventions: Nasal cannula in place. HENT:      Head: Normocephalic and atraumatic. Nose:      Right Sinus: No maxillary sinus tenderness or frontal sinus tenderness. Left Sinus: No maxillary sinus tenderness or frontal sinus tenderness. Mouth/Throat:      Pharynx: No oropharyngeal exudate. Eyes:      General: No scleral icterus. Conjunctiva/sclera: Conjunctivae normal.      Pupils: Pupils are equal, round, and reactive to light. Neck:      Thyroid: No thyromegaly. Vascular: No JVD. Cardiovascular:      Rate and Rhythm: Regular rhythm. Tachycardia present. Pulses:           Dorsalis pedis pulses are 2+ on the right side and 2+ on the left side. Heart sounds: Normal heart sounds. No murmur heard. Pulmonary:      Effort: Pulmonary effort is normal.      Breath sounds: Normal breath sounds. No wheezing or rales. Abdominal:      Palpations: Abdomen is soft. There is no mass. Tenderness: There is no abdominal tenderness. Musculoskeletal:      Cervical back: Full passive range of motion without pain and neck supple. Lymphadenopathy:      Head:      Right side of head: No submandibular adenopathy. Left side of head: No submandibular adenopathy. Cervical: No cervical adenopathy. Skin:     General: Skin is warm. Neurological:      Mental Status: He is alert and oriented to person, place, and time. Motor: No tremor. Psychiatric:         Behavior: Behavior is cooperative.            Laboratory findings:    Recent Labs     04/14/22  0403   WBC 14.3*   HGB 12.2*   HCT 37.3*        Recent Labs     04/14/22  0403      K 4.4      CO2 23 GLUCOSE 238*   BUN 33*   CREATININE 0.73   CALCIUM 8.5*     No results for input(s): PROT, LABALBU, LABA1C, R6OKNUP, Y4KVQBS, FT4, TSH, AST, ALT, LDH, GGT, ALKPHOS, BILITOT, BILIDIR, AMMONIA, AMYLASE, LIPASE, LACTATE, CHOL, HDL, LDLCHOLESTEROL, CHOLHDLRATIO, TRIG, VLDL, BNP, TROPONINI, CKTOTAL, CKMB, CKMBINDEX, RF, BREE in the last 72 hours. Specific Gravity, UA   Date Value Ref Range Status   04/08/2022 1.025 1.005 - 1.030 Final     Protein, UA   Date Value Ref Range Status   04/08/2022 1+ (A) NEGATIVE Final     RBC, UA   Date Value Ref Range Status   04/08/2022 TOO NUMEROUS TO COUNT 0 - 2 /HPF Final     Bacteria, UA   Date Value Ref Range Status   04/08/2022 MODERATE (A) None Final     Nitrite, Urine   Date Value Ref Range Status   04/08/2022 NEGATIVE NEGATIVE Final     WBC, UA   Date Value Ref Range Status   04/08/2022 20 TO 50 0 - 5 /HPF Final     Leukocyte Esterase, Urine   Date Value Ref Range Status   04/08/2022 SMALL (A) NEGATIVE Final       Imaging / Clinical Data :-   XR CHEST (SINGLE VIEW FRONTAL)    Result Date: 4/13/2022  Tiny residual right apical pneumothorax. XR CHEST (SINGLE VIEW FRONTAL)    Result Date: 4/12/2022  Chronic pulmonary change with subtle right basilar infiltrate. New right apical pneumothorax. The findings were sent to the Radiology Results [address] at 7:39 am on 4/12/2022to be communicated to a licensed caregiver. CT CHEST WO CONTRAST    Result Date: 4/12/2022  Small right anterior basilar pneumothorax. Bibasilar consolidations reflecting atelectasis versus airspace disease. XR CHEST PORTABLE    Result Date: 4/14/2022  Stable exam since yesterday. Minor chronic appearing interstitial prominence. Possible mild emphysema. XR CHEST PORTABLE    Result Date: 4/13/2022  Stable appearance of a tiny right apical pneumothorax     XR CHEST PORTABLE    Result Date: 4/12/2022  Slightly smaller apical pneumothorax. No other interval change.      XR CHEST PORTABLE    Result Date: 4/10/2022  1. Bibasilar airspace opacities potentially due to atelectasis, scarring, aspiration, and/or pneumonia. 2. Possible trace right pleural effusion. CT CHEST PULMONARY EMBOLISM W CONTRAST    Result Date: 4/11/2022  1. Age-indeterminate compression deformity of T5. Correlate for back pain and point tenderness at this level. Further evaluation with MRI is recommended assuming there are no contraindications to MRI. 2. No evidence for central pulmonary embolus. Evaluation of the lobar pulmonary arterial vasculature and beyond is limited due to factors detailed above. 3. Mild cardiomegaly. Coronary artery disease. Atherosclerotic calcification of the aorta. 4. Mild-to-moderate emphysema. Bilateral lower lobe airspace consolidation and respiratory motion, atelectasis and/or infiltrate. Follow-up is recommended to document resolution. 5. Bilateral partially visualized renal cysts. Confirmation with renal ultrasound recommended. Bilateral punctate renal vascular calcifications and/or nonobstructing punctate renal calculi. FL MODIFIED BARIUM SWALLOW W VIDEO    Result Date: 4/11/2022  Swallowing mechanism assessed with preliminary findings as above. Deep laryngeal penetration demonstrated without aspiration. Please see separate speech pathology report for full discussion of findings and recommendations. Clinical Course : stable  Assessment and Plan  :        1. Acute metabolic encephalopathy secondary to catheter associated UTI-antibiotics. -Resolved  2. Acute respiratory failure secondary to COPD exacerbation  -started on home O2 1 week before. Continued supplement. 3. Right apical pneumothorax -treated conservatively. 4. Sudden sinus tachycardia-check x-ray, stat chest x-ray, O2 supplement. IV fluid bolus for hypotension and sinus tachycardia. 5. Dysphagia -modified dysphagia diet with moderate thick liquids.   6. BPH with chronic urinary retention -chronic indwelling Worley  7. Type 2 diabetes mellitus -on Metformin at home. Humalog as needed. Keep monitoring in ICU. Continue to monitor vitals , Intake / output ,  Cell count , HGB , Kidney function, oxygenation  as indicated . Plan and updates discussed with patient ,  answers  explained to satisfaction. No family at bedside. Patient says that he lives with daughter and son in law .    Plan discussed with Staff Tim Carcamo RN     (Please note that portions of this note were completed with a voice recognition program. Efforts were made to edit the dictations but occasionally words are mis-transcribed.)      Shayy Espino MD  4/15/2022

## 2022-04-15 NOTE — CONSULTS
Section of Cardiology   Consult Note      Reason for Consult:  Atrial fibrillation  Requesting Physician: Priscilla Walker MD    CHIEF COMPLAINT:  Respiratory failure    History Obtained From:  patient, electronic medical record, patient's nurse    HISTORY OF PRESENT ILLNESS:      The patient is a [de-identified] y.o. male with significant past medical history of COPD who presents with shortness of breath. The patient was discovered to have acute exacerbation of COPD and emphysema with spontaneous right pneumothorax. Today the patient became tachycardic and was diagnosed to have atrial fibrillation. Blood pressure was on the low side. I was contacted this afternoon and I gave the patient IV digoxin twice and shortly thereafter patient converted to sinus rhythm. At the time I saw the patient his daughter was at bedside who helped with the history. The patient is legally blind. He appeared to be slightly hyper personality. He did not report palpitation. No dizziness. His breathing appears to be more stable. He denies chest pain. He does not see a cardiologist.  He does not report previous rhythm problem. Previous diagnostic testing for coronary artery disease includes: echocardiogram.   Previous history of cardiac disease includes: none. Coronary artery disease risk factors include: advanced age (older than 54 for men, 72 for women), male gender, sedentary lifestyle and smoking/ tobacco exposure. Past Medical History:    Past Medical History:   Diagnosis Date    Hip fracture ([facility] [address]      Past Surgical History:    Past Surgical History:   Procedure Laterality Date    EYE SURGERY       Home Medications:  Prior to Admission medications    Medication Sig Start Date End Date Taking?  Authorizing Provider   ipratropium-albuterol (DUONEB) 0.5-2.5 (3) MG/3ML SOLN nebulizer solution Inhale 3 mLs into the lungs every 4 hours as needed for Shortness of Breath 4/14/22  Yes Viet Srinivasan MD   predniSONE (DELTASONE) 20 MG tablet Take 2 tablets by mouth daily for 5 days 4/14/22 4/19/22 Yes Jelena Wen MD   budesonide-formoterol (SYMBICORT) 160-4.5 MCG/ACT AERO Inhale 2 puffs into the lungs 2 times daily 4/14/22  Yes Jelena Wen MD   finasteride (PROSCAR) 5 MG tablet Take 5 mg by mouth daily   Yes Historical Provider, MD   tamsulosin (FLOMAX) 0.4 MG capsule Take 0.4 mg by mouth at bedtime   Yes Historical Provider, MD   calcium citrate (CALCITRATE) 950 (200 Ca) MG tablet Take 400 mg by mouth 2 times daily  12/23/21   Historical Provider, MD   acetaminophen (TYLENOL) 500 MG tablet Take 1,000 mg by mouth every 6 hours as needed 11/30/21   Historical Provider, MD   Cholecalciferol 50 MCG (2000 UT) TABS Take 1 tablet by mouth daily 1/18/22   Mile Blackville, DO   sertraline (ZOLOFT) 25 MG tablet Take 1 tablet by mouth daily 1/18/22   Mile Blackville, DO   metFORMIN (GLUCOPHAGE) 850 MG tablet Take 1 tablet by mouth 2 times daily (with meals) 12/27/21   Mile Blackville, DO   latanoprost (XALATAN) 0.005 % ophthalmic solution Administer 1 drop to the right eye nightly.  10/27/16   Historical Provider, MD     Current Medications:    Current Facility-Administered Medications   Medication Dose Route Frequency Provider Last Rate Last Admin    predniSONE (DELTASONE) tablet 40 mg  40 mg Oral Daily DESTINEE Douglas - CNP   40 mg at 04/15/22 1557    0.9 % sodium chloride infusion   IntraVENous Continuous Cassie Joel MD 50 mL/hr at 04/15/22 1249 New Bag at 04/15/22 1249    sodium chloride flush 0.9 % injection 10 mL  10 mL IntraVENous PRN Jelena Wen MD   10 mL at 04/11/22 1017    0.9 % sodium chloride bolus  100 mL IntraVENous Once Jelena Wen MD        budesonide (PULMICORT) nebulizer suspension 500 mcg  0.5 mg Nebulization BID Angel Kinney MD   500 mcg at 04/14/22 2028    ipratropium-albuterol (DUONEB) nebulizer solution 1 ampule  1 ampule Inhalation Q4H PRN Angel Kinney MD        glucagon (rDNA) injection 1 mg  1 mg IntraMUSCular PRN DESTINEE Cardenas CNP        dextrose 5 % solution  100 mL/hr IntraVENous PRN DESTINEE Cardenas CNP        insulin lispro (HUMALOG) injection vial 0-6 Units  0-6 Units SubCUTAneous TID  DESTINEE Cardenas CNP   1 Units at 04/15/22 1711    insulin lispro (HUMALOG) injection vial 0-3 Units  0-3 Units SubCUTAneous Nightly DESTINEE Cardenas CNP   3 Units at 04/14/22 2213    dextrose bolus (hypoglycemia) 10% 125 mL  125 mL IntraVENous PRN DESTINEE Cardenas CNP        Or    dextrose bolus (hypoglycemia) 10% 250 mL  250 mL IntraVENous PRN DESTINEE Cardenas CNP        glucose chewable tablet 4 each  4 each Oral PRN DESTINEE Cardenas CNP        QUEtiapine (SEROQUEL) tablet 25 mg  25 mg Oral BID PRN Salome Lopez MD   25 mg at 04/14/22 2213    fluticasone (FLONASE) 50 MCG/ACT nasal spray 2 spray  2 spray Each Nostril Daily Salome Lopez MD   2 spray at 04/14/22 1013    potassium chloride (KLOR-CON M) extended release tablet 40 mEq  40 mEq Oral PRN Salome Lopez MD   40 mEq at 04/09/22 1141    Or    potassium bicarb-citric acid (EFFER-K) effervescent tablet 40 mEq  40 mEq Oral PRN Salome Lopez MD        Or    potassium chloride 10 mEq/100 mL IVPB (Peripheral Line)  10 mEq IntraVENous PRN Salome Lopez MD        lidocaine (XYLOCAINE) 2 % uro-jet 5 mL  5 mL Topical Once Sherryle Creamer,         latanoprost (XALATAN) 0.005 % ophthalmic solution 1 drop  1 drop Right Eye Nightly DESTINEE Dunn CNP   1 drop at 04/14/22 2206    tamsulosin (FLOMAX) capsule 0.8 mg  0.8 mg Oral Daily DESTINEE Dunn CNP   0.8 mg at 04/15/22 1016    metFORMIN (GLUCOPHAGE) tablet 850 mg  850 mg Oral BID  DESTINEE Dnun CNP   850 mg at 04/15/22 1712    Vitamin D (CHOLECALCIFEROL) tablet 2,000 Units  2,000 Units Oral Daily Yeni Wilkinson, APRN - CNP   2,000 Units at 04/15/22 1016    sertraline (ZOLOFT) tablet 25 mg  25 mg Oral Daily Yeni DESTINEE Kinney - CNP   25 mg at 04/15/22 1016    sodium chloride flush 0.9 % injection 5-40 mL  5-40 mL IntraVENous 2 times per day Delmi Sandra TRU WilkinsonN - CNP   10 mL at 04/15/22 1016    sodium chloride flush 0.9 % injection 5-40 mL  5-40 mL IntraVENous PRN Yeni PASTORA Wilkinson APRN - CNP   10 mL at 04/12/22 0108    0.9 % sodium chloride infusion   IntraVENous PRN DESTINEE Dunn - CNP   Stopped at 04/11/22 1654    enoxaparin (LOVENOX) injection 40 mg  40 mg SubCUTAneous Daily DESTINEE Dunn - CNP   40 mg at 04/15/22 1016    ondansetron (ZOFRAN-ODT) disintegrating tablet 4 mg  4 mg Oral Q8H PRN DESTINEE Dunn - CNP        Or    ondansetron (ZOFRAN) injection 4 mg  4 mg IntraVENous Q6H PRN DESTINEE Dunn - CNP        acetaminophen (TYLENOL) tablet 650 mg  650 mg Oral Q6H PRN DESTINEE Dunn - CNP   650 mg at 04/11/22 2202    Or    acetaminophen (TYLENOL) suppository 650 mg  650 mg Rectal Q6H PRN Yeni Wilkinson APRN - CNP        polyethylene glycol (GLYCOLAX) packet 17 g  17 g Oral Daily PRN DESTINEE Dunn - CNP        nicotine (NICODERM CQ) 21 MG/24HR 1 patch  1 patch TransDERmal Daily Vito Moreno MD   1 patch at 04/15/22 1017    oxyCODONE-acetaminophen (PERCOCET) 5-325 MG per tablet 1 tablet  1 tablet Oral Q4H PRN Vito Moreno MD   1 tablet at 04/08/22 1329    finasteride (PROSCAR) tablet 5 mg  5 mg Oral Daily Vito Moreno MD   5 mg at 04/15/22 1250     Allergies:  Patient has no known allergies. Social History:    Social History     Socioeconomic History    Marital status:       Spouse name: Not on file    Number of children: Not on file    Years of education: Not on file    Highest education level: Not on file   Occupational History    Not on file   Tobacco Use    Smoking status: Current Every Day Smoker     Packs/day: 1.00 Years: 60.00     Pack years: 60.00     Types: Cigarettes    Smokeless tobacco: Never Used   Substance and Sexual Activity    Alcohol use: No    Drug use: Yes     Types: Marijuana [employer])    Sexual activity: Not on file   Other Topics Concern    Not on file   Social History Narrative    Not on file     Social Determinants of Health     Financial Resource Strain: Low Risk     Difficulty of Paying Living Expenses: Not hard at all   Food Insecurity: No Food Insecurity    Worried About [facility] in the Last Year: Never true    [facility] in the Last Year: Never true   Transportation Needs:     Lack of Transportation (Medical): Not on file    Lack of Transportation (Non-Medical): Not on file   Physical Activity:     Days of Exercise per Week: Not on file    Minutes of Exercise per Session: Not on file   Stress:     Feeling of Stress : Not on file   Social Connections:     Frequency of Communication with Friends and Family: Not on file    Frequency of Social Gatherings with Friends and Family: Not on file    Attends Muslim Services: Not on file    Active Member of [address] [place] [address] or Organizations: Not on file    Attends Club or Organization Meetings: Not on file    Marital Status: Not on file   Intimate Partner Violence:     Fear of Current or Ex-Partner: Not on file    Emotionally Abused: Not on file    Physically Abused: Not on file    Sexually Abused: Not on file   Housing Stability:     Unable to Pay for Housing in the Last Year: Not on file    Number of [place] in the Last Year: Not on file    Unstable Housing in the Last Year: Not on file     Family History:   Family History   Problem Relation Age of Onset    High Blood Pressure Mother     Heart Disease Mother     Kidney Disease Mother     Other Sister         obesity       · REVIEW OF SYSTEMS   Positive for cough. No current fever or chills. No abdominal pain. He has fair appetite. No current back pain.   He does not report history of bleeding. PHYSICAL EXAM:    Vitals:    VITALS:  /78   Pulse 68   Temp 97.9 °F (36.6 °C) (Oral)   Resp 24   Ht 5' 11\" (1.803 m)   Wt 131 lb 8 oz (59.6 kg)   SpO2 95%   BMI 18.34 kg/m²   24HR INTAKE/OUTPUT:      Intake/Output Summary (Last 24 hours) at 4/15/2022 1821  Last data filed at 4/15/2022 1700  Gross per 24 hour   Intake 1558.69 ml   Output 2100 ml   Net -541.31 ml       CONSTITUTIONAL:  awake, alert, cooperative, no apparent distress, and appears cachectic and older than stated age    ENT:  normocepalic, without obvious abnormality  NECK:  supple, symmetrical, trachea midline, no carotid bruit ,   No  JVD  BACK:  symmetric  LUNGS: Non-labored, good air exchange, clear to auscultation bilaterally, no crackles or wheezing  CARDIOVASCULAR:  Normal apical impulse, regular rate and rhythm, normal S1 and S2, no S3 or S4, and no murmur noted, no rub.  radial and bilateralpresent 1+  ABDOMEN:  No scars, normal bowel sounds, soft, non-distended, non-tender,  MUSCULOSKELETAL:  there is no redness, warmth, or swelling of the joints   No leg edema. NEUROLOGIC:  Awake, alert, oriented to name, place and time. SKIN:  no bruising or bleeding, normal skin color, texture, turgor and no jaundice    DATA:   ECG:  Today's EKG showed atrial fibrillation with nonspecific ST-T changes. ECHO: Date:today   results are pending  Stress Test:  Not performed to date  Angiography:  Not performed to date    Cardiology Labs:No results for input(s): MYOGLOBIN, CKTOTAL, CKMB, CKMBINDEX, TROPONINT, BNP in the last 72 hours.   Warfarin PT/INR:  Lab Results   Component Value Date    PROTIME 13.4 04/08/2022    INR 1.0 04/08/2022     CBC:  Lab Results   Component Value Date    WBC 10.9 04/15/2022    RBC 4.44 04/15/2022    HGB 13.2 04/15/2022    HCT 41.1 04/15/2022    MCV 92.6 04/15/2022    MCH 29.7 04/15/2022    MCHC 32.1 04/15/2022    RDW 13.8 04/15/2022     04/15/2022    MPV 9.0 04/15/2022     CMP:  Lab Results   Component Value Date     04/15/2022    K 3.8 04/15/2022     04/15/2022    CO2 25 04/15/2022    BUN 25 04/15/2022    CREATININE 0.62 04/15/2022    GFRAA >60 04/15/2022    LABGLOM >60 04/15/2022    GLUCOSE 317 04/15/2022    CALCIUM 8.0 04/15/2022     Magnesium:    Lab Results   Component Value Date    MG 1.7 04/09/2022     PTT:    Lab Results   Component Value Date    APTT 35.0 04/08/2022     TSH:    Lab Results   Component Value Date    TSH 1.69 10/30/2021     BMP:  Lab Results   Component Value Date     04/15/2022    K 3.8 04/15/2022     04/15/2022    CO2 25 04/15/2022    BUN 25 04/15/2022    LABALBU 3.8 04/08/2022    CREATININE 0.62 04/15/2022    CALCIUM 8.0 04/15/2022    GFRAA >60 04/15/2022    LABGLOM >60 04/15/2022    GLUCOSE 317 04/15/2022     LIVER PROFILE:No results for input(s): AST, ALT, LABALBU, ALKPHOS, BILITOT, BILIDIR, IBILI, PROT, GLOB, ALBUMIN in the last 72 hours. FLP:    Lab Results   Component Value Date    CHOL 193 10/30/2021    TRIG 78 10/30/2021    HDL 80 10/30/2021    LDLCHOLESTEROL 97 10/30/2021         IMPRESSION   1. new onset atrial fibrillation with RVR  2. COPD exacerbation contributing to above  3. Encephalopathy, resolved  4. Spontaneous right-sided pneumothorax  5.   Legally blind    Patient Active Problem List   Diagnosis    Type 2 diabetes mellitus, without long-term current use of insulin ([address]    Essential hypertension    Advanced open-angle glaucoma    Benign non-nodular prostatic hyperplasia with lower urinary tract symptoms    Dry eyes, bilateral    Elevated PSA    Blindness of both eyes    Closed right hip fracture (HCC)    UTI (urinary tract infection)    Moderate protein-calorie malnutrition (HCC)    Toxic metabolic encephalopathy    Tobacco abuse    Acute respiratory failure with hypoxia (HCC)    Pulmonary emphysema (HCC)    COPD exacerbation ([address]    Secondary spontaneous pneumothorax           RECOMMENDATIONS:     Continue current medications  Management plan was discussed with patient     IV digoxin given this afternoon. I fully explained to the patient and he is daughter, what atrial fibrillation is and how it forms and it is fairly common in people with advanced COPD. At this point in time I do not recommend anticoagulation. His prognosis is related to his COPD and pulmonary status. Recommend conservative approach  I'll review his echocardiogram and give further recommendations as needed. Discussed with the patient's nurse  Thank you for the consultation.       Electronically signed by Dorinda Clark MD on 4/15/2022 at 6:21 PM     CC: Noe Breaux DO

## 2022-04-15 NOTE — PLAN OF CARE
Problem: Falls - Risk of:  Goal: Will remain free from falls  Description: Will remain free from falls  4/15/2022 0050 by Bulmaro Smart RN  Outcome: Ongoing  4/14/2022 1447 by Yuli Irwin RN  Outcome: Ongoing  Goal: Absence of physical injury  Description: Absence of physical injury  4/15/2022 0050 by Bulmaro Smart RN  Outcome: Ongoing  4/14/2022 1447 by Yuli Irwin RN  Outcome: Ongoing     Problem: Pain:  Goal: Pain level will decrease  Description: Pain level will decrease  Outcome: Ongoing  Goal: Control of acute pain  Description: Control of acute pain  Outcome: Ongoing  Goal: Control of chronic pain  Description: Control of chronic pain  Outcome: Ongoing     Problem: Skin Integrity:  Goal: Will show no infection signs and symptoms  Description: Will show no infection signs and symptoms  4/15/2022 0050 by Bulmaro Smart RN  Outcome: Ongoing  4/14/2022 1447 by Yuli Irwin RN  Outcome: Ongoing  Goal: Absence of new skin breakdown  Description: Absence of new skin breakdown  4/15/2022 0050 by Bulmaro Smart RN  Outcome: Ongoing  4/14/2022 1447 by Yuli Irwin RN  Outcome: Ongoing     Problem: Sensory:  Goal: General experience of comfort will improve  Description: General experience of comfort will improve  4/15/2022 0050 by Bulmaro Smart RN  Outcome: Ongoing  4/14/2022 1447 by Yuli Irwin RN  Outcome: Ongoing     Problem: Urinary Elimination:  Goal: Signs and symptoms of infection will decrease  Description: Signs and symptoms of infection will decrease  Outcome: Ongoing  Goal: Ability to reestablish a normal urinary elimination pattern will improve - after catheter removal  Description: Ability to reestablish a normal urinary elimination pattern will improve  Outcome: Ongoing  Goal: Complications related to the disease process, condition or treatment will be avoided or minimized  Description: Complications related to the disease process, condition or treatment will be avoided or minimized  Outcome: Ongoing     Problem: Coping:  Goal: Ability to remain calm will improve  Description: Ability to remain calm will improve  Outcome: Ongoing     Problem: Safety:  Goal: Ability to remain free from injury will improve  Description: Ability to remain free from injury will improve  4/15/2022 0050 by Ricardo Boone RN  Outcome: Ongoing  4/14/2022 1447 by Rafa Shukla RN  Outcome: Ongoing     Problem: Self-Care:  Goal: Ability to participate in self-care as condition permits will improve  Description: Ability to participate in self-care as condition permits will improve  4/15/2022 0050 by Ricardo Boone RN  Outcome: Ongoing  4/14/2022 1447 by Rafa Shukla RN  Outcome: Ongoing Quality 402: Tobacco Use And Help With Quitting Among Adolescents: Patient screened for tobacco and never smoked Quality 130: Documentation Of Current Medications In The Medical Record: Current Medications Documented Quality 431: Preventive Care And Screening: Unhealthy Alcohol Use - Screening: Patient screened for unhealthy alcohol use using a single question and scores less than 2 times per year Detail Level: Zone Quality 226: Preventive Care And Screening: Tobacco Use: Screening And Cessation Intervention: Patient screened for tobacco use and is an ex/non-smoker

## 2022-04-15 NOTE — PROGRESS NOTES
[facility]   Urology Progress Note            Subjective: Follow-up urinary retention gross hematuria    Patient Vitals for the past 24 hrs:   BP Temp Temp src Pulse Resp SpO2 Height   04/15/22 0400 131/74 97.6 °F (36.4 °C) Temporal (!) 45 17 99 % --   04/15/22 0000 (!) 140/90 98 °F (36.7 °C) Temporal 61 17 97 % --   04/14/22 2300 130/66 -- -- 52 18 95 % --   04/14/22 2230 134/81 -- -- 63 18 93 % --   04/14/22 2027 -- -- -- -- 26 92 % --   04/14/22 2010 125/80 98.6 °F (37 °C) Temporal 84 18 92 % --   04/14/22 1619 -- 98.7 °F (37.1 °C) Temporal -- -- -- --   04/14/22 1600 -- -- -- -- -- 98 % --   04/14/22 1315 -- -- -- -- -- -- 5' 11\" (1.803 m)   04/14/22 1200 -- 98.8 °F (37.1 °C) Temporal -- -- -- --   04/14/22 0845 -- -- -- -- 21 97 % --   04/14/22 0835 -- 98.2 °F (36.8 °C) Temporal -- -- -- --       Intake/Output Summary (Last 24 hours) at 4/15/2022 0611  Last data filed at 4/14/2022 1433  Gross per 24 hour   Intake 300 ml   Output 775 ml   Net -475 ml       Recent Labs     04/14/22  0403   WBC 14.3*   HGB 12.2*   HCT 37.3*   MCV 92.1        Recent Labs     04/14/22  0403      K 4.4      CO2 23   BUN 33*   CREATININE 0.73       No results for input(s): COLORU, PHUR, LABCAST, WBCUA, RBCUA, MUCUS, TRICHOMONAS, YEAST, BACTERIA, CLARITYU, SPECGRAV, LEUKOCYTESUR, UROBILINOGEN, BILIRUBINUR, BLOODU in the last 72 hours.     Invalid input(s): NITRATE, GLUCOSEUKETONESUAMORPHOUS    Additional Lab/culture results:    Physical Exam: Patient alert, no evidence of urinary retention, bladder decompressed Worley catheter in place    Interval Imaging Findings:    Impression:    Patient Active Problem List   Diagnosis    Type 2 diabetes mellitus, without long-term current use of insulin [facility]    Essential hypertension    Advanced open-angle glaucoma    Benign non-nodular prostatic hyperplasia with lower urinary tract symptoms    Dry eyes, bilateral    Elevated PSA    Blindness of both eyes    Closed right hip fracture (HCC)    UTI (urinary tract infection)    Moderate protein-calorie malnutrition (HCC)    Toxic metabolic encephalopathy    Tobacco abuse    Acute respiratory failure with hypoxia (HCC)    Pulmonary emphysema (HCC)    [facility]    Secondary spontaneous pneumothorax       Plan: Maintain indwelling Worley    Thermloly Garcia MD  6:11 AM 4/15/2022

## 2022-04-15 NOTE — PROGRESS NOTES
Physical Therapy  Facility/Department: [facility] ICU  Daily Treatment Note  NAME: Adin Mckeon  : 1940  MRN: [id number]    Date of Service: 4/15/2022    Discharge Recommendations:  Patient would benefit from continued therapy after discharge        Assessment   Body structures, Functions, Activity limitations: Decreased functional mobility ; Decreased endurance;Decreased strength;Decreased balance;Decreased posture  Assessment: Patient requries a MOD x2 with RW for ambulation due to decreased balance and stability and visual deficits. Patient would benefit from continued skilled PT treatment to maximize functional mobility and return to PLOF. Activity Tolerance  Activity Tolerance: Patient limited by endurance; Patient limited by fatigue;Treatment limited secondary to medical complications (free text)     Patient Diagnosis(es): The primary encounter diagnosis was Altered mental status, unspecified altered mental status type. A diagnosis of Acute cystitis with hematuria was also pertinent to this visit. has a past medical history of Hip fracture ([facility] [address] has a past surgical history that includes eye surgery. 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), Foely cath, easily agitated, up with assist, LUE IV, ALARMS  Subjective   General  Chart Reviewed: Yes  Subjective  Subjective: Patient agreeable to PT treatment  General Comment  Comments: ok for PT per RN  Pain Screening  Patient Currently in Pain: Denies  Vital Signs  Patient Currently in Pain: Denies       Orientation     Cognition      Objective   Bed mobility  Scooting: Minimal assistance; Moderate assistance;2 Person assistance  Transfers  Sit to Stand: Minimal Assistance; Moderate Assistance;2 Person Assistance  Stand to sit: Minimal Assistance; Moderate Assistance;2 Person Assistance  Ambulation  Ambulation?: no, pt stood with RW but did not take steps as HR tachy   Ambulation 1  Surface: level tile  Device: Rolling Walker  Comments: pt limited by elevated HR and variable blood pressure readings      Balance  Posture: Fair  Sitting - Static: Fair;+  Sitting - Dynamic: Fair  Standing - Static: Fair  Standing - Dynamic: Fair;-  Exercises  Comments: reviewed seated ex with pt. MIN/MOD A x 2 for bed mobility and to change gown, bedding and don hospital pants, extended time needed for all activity as pt required rest breaks. Pt requires gentle encouragement and patience as he gets agitated easily            Comment: pt able to sit edge of bed for approx, 20 min ambulation not completed this date due to tachy HR, RN aware                G-Code     OutComes Score                                                     AM-PAC Score  AM-PAC Inpatient Mobility Raw Score : 14 (04/15/22 1105)  AM-PAC Inpatient T-Scale Score : 38.1 (04/15/22 1105)  Mobility Inpatient CMS 0-100% Score: 61.29 (04/15/22 1105)  Mobility Inpatient CMS G-Code Modifier : CL (04/15/22 1105)          Goals  Short term goals  Time Frame for Short term goals: 12 treatments  Short term goal 1: Independent transfers  Short term goal 2: SBA ambulation w/ ' x 1/ CGA ascending/descending 7 steps w/ B HR  Short term goal 3: Good standing balance and posture  Short term goal 4: Tolerate 30 min ther act  Short term goal 5: 5/5 B LE strength  Patient Goals   Patient goals : Home to my own apt. Plan    Plan  Times per week: 1-2x/day,5-6 days/week  Current Treatment Recommendations: Strengthening,Transfer Training,Balance Training,Endurance Training,Gait Training,Stair training  Safety Devices  Type of devices:  All fall risk precautions in place  Restraints  Initially in place: No     Therapy Time   cotreat Concurrent Group Co-treatment   Time In  910         Time Out  1018         Minutes  68               Co-treatment with OT warranted secondary to decreased safety and independence requiring 2 skilled therapy professionals to address individual discipline's goals. PT addressing preparation for  Transfers, gait and pre gait activities,  sitting balance for increased  sitting/activity tolerance, functional mobility, environmental safety/scanning, fall prevention, functional mobility  transfers and functional LE strength. Upon writer exit, call light within reach, pt retired to bed. All lines intact and patient positioned comfortably. All patient needs addressed prior to ending therapy session. Chart reviewed prior to treatment and patient is agreeable for therapy. RN reports patient is medically stable for therapy treatment this date.        SHAY FLORES, PTA

## 2022-04-15 NOTE — PLAN OF CARE
Problem: Falls - Risk of:  Goal: Will remain free from falls  Description: Will remain free from falls  Outcome: Ongoing     Problem: Pain:  Goal: Control of chronic pain  Description: Control of chronic pain  Outcome: Ongoing     Problem: Urinary Elimination:  Goal: Signs and symptoms of infection will decrease  Description: Signs and symptoms of infection will decrease  Outcome: Ongoing     Problem: Self-Care:  Goal: Ability to participate in self-care as condition permits will improve  Description: Ability to participate in self-care as condition permits will improve  Outcome: Ongoing

## 2022-04-15 NOTE — PROGRESS NOTES
Speech Language Pathology  Speech Language Pathology  [address] [facility]    Dysphagia Treatment Note    Date: 4/15/2022  Patients Name: Adin Mckeon  MRN: [id number]  Diagnosis: dysphagia  Patient Active Problem List   Diagnosis Code    Type 2 diabetes mellitus, without long-term current use of insulin ([facility] [address] E11.9    Essential hypertension I10    Advanced open-angle glaucoma H40.10X0    Benign non-nodular prostatic hyperplasia with lower urinary tract symptoms N40.1    Dry eyes, bilateral H04.123    Elevated PSA R97.20    Blindness of both eyes H54.3    Closed right hip fracture ([facility] [address] S72.001A    UTI (urinary tract infection) N39.0    Moderate protein-calorie malnutrition (HCC) E44.0    Toxic metabolic encephalopathy M89.0    Tobacco abuse Z72.0    Acute respiratory failure with hypoxia (HCC) J96.01    Pulmonary emphysema (HCC) J43.9    COPD exacerbation ([facility]) J44.1    Secondary spontaneous pneumothorax J93.12       Pain: 0/10    Dysphagia Treatment  Treatment time: 1605-0003    Subjective: [x] Alert [x] Cooperative     [] Confused     [] Agitated    [] Lethargic    Objective/Assessment:    Pt. Seen for O/M treatment program.  Pt. Completed O/M exercises X 5 X 1 sets with mod cues. Pt. Completed fawn maneuver X 2/5 reps with mod cues. Education provided re: exercises and aspiration risk. Pt. Was observed with Pills this morning. Pt with no overt s/s of aspiration noted with oral pills whole and honey thick liquids via straw. Recommend continue current diet and aspiration precautions. Plan:  [x] Continue ST services    [] Discharge from ST:        Discharge recommendations: []  Further therapy recommended at discharge. The patient should be able to tolerate at least 3 hours of therapy per day over 5 days or 15 hours over 7 days. [x] Further therapy recommended at discharge. [] No therapy recommended at discharge. Treatment completed by:  Wanda Munson, SLP, MTamikoS. [id number] [facility]

## 2022-04-15 NOTE — CARE COORDINATION
Social work: spoke to Lita Griggs at [employer] they do have [address]. RN advised pt not likely ready today. Maybe tomorrow. Called and [facility]ed [facility] to wait till am and will advise. Will need amira, Rx and discharge order for snf at discharge.  Cary ramirez

## 2022-04-15 NOTE — PROGRESS NOTES
Patient converted to sinus rhythm at 1700, rate 65. Patient denies any issues at this time. Dr. Renee Hurley on call, notified. No new orders at this time.

## 2022-04-15 NOTE — PROGRESS NOTES
Pulmonary Critical Care Progress Note  Ena Councilman, MD     Patient seen for the follow up of continuous pneumothorax, COPD exacerbation    Subjective:  Patient is resting in bed. He denies any shortness of breath or chest pain. He has occasional, loose nonproductive cough. He remains on insulin oxygen by nasal cannula. He is tolerating his dysphagia diet. He went into A. fib with RVR this morning. He received fluid bolus this morning. Examination:  Vitals: /70   Pulse 91   Temp 97.6 °F (36.4 °C) (Temporal)   Resp 17   Ht 5' 11\" (1.803 m)   Wt 131 lb 8 oz (59.6 kg)   SpO2 100%   BMI 18.34 kg/m²   General appearance: alert and cooperative with exam, resting comfortably in bed  Neck: No JVD  Lungs: Moderate to decreased air exchange, no wheezing  Heart: regular rate and rhythm, S1, S2 normal, no gallop  Abdomen: Soft, non tender, + BS  Extremities: no cyanosis or clubbing.  No significant edema    LABs:  CBC:   Recent Labs     04/14/22  0403 04/15/22  1103   WBC 14.3* 10.9   HGB 12.2* 13.2   HCT 37.3* 41.1    294     BMP:   Recent Labs     04/14/22  0403 04/15/22  1103    135   K 4.4 3.8   CO2 23 25   BUN 33* 25*   CREATININE 0.73 0.62*   LABGLOM >60 >60   GLUCOSE 238* 317*     ABG:  Lab Results   Component Value Date    FIO2 50.0 04/11/2022       Lab Results   Component Value Date    POCPH 7.458 04/11/2022    POCPCO2 34.3 04/11/2022    POCPO2 49.9 04/11/2022    POCHCO3 24.3 04/11/2022    PBEA 1 04/11/2022    PPJO7ZLG 87 04/11/2022    FIO2 50.0 04/11/2022     Radiology:  X-ray chest 4/15/2022      Impression:  · Acute hypoxic respiratory insufficiency  · Acute exacerbation of COPD/emphysema  · Spontaneous right pneumothorax  · Atelectasis/basilar consolidation, possible aspiration  · Active smoking  · Elevated D-dimer  · Encephalopathy, improved  · UTI with indwelling Worley  · Compression fracture at T5  · Legally blind    Recommendations:  · Oxygen via nasal cannula  · DuoNeb via nebulizer  · Pulmicort every 12 hours via nebulizer  · Discontinue IV Solu-Medrol  · Start prednisone taper  · IV Rocephin per primary team  · Lower extremity Dopplers negative  · Diet per speech therapy recommendations  · Urology following  · Smoking cessation/nicotine patch  · DVT prophylaxis, Lovenox  · Discharge planning to [facility]  · We will follow with you    Electronically signed by     Shelli Tam MD on 4/15/2022 at 1:59 PM  Pulmonary Critical Care and Sleep Medicine,  [facility]  Cell: [phone]  Office: [phone]

## 2022-04-15 NOTE — PROGRESS NOTES
Occupational Therapy  Facility/Department: [facility] ICU  Daily Treatment Note  NAME: Joan Sales  : 1940  MRN: [id number]    Date of Service: 4/15/2022    Discharge Recommendations:  Patient would benefit from continued therapy after discharge  OT Equipment Recommendations  ADL Assistive Devices: Long-handled Shoe Horn;Emergency Alert System; Reacher;Sock-Aid Hard;Long-handled Sponge    Assessment   Performance deficits / Impairments: Decreased functional mobility ; Decreased ADL status; Decreased safe awareness;Decreased cognition;Decreased endurance;Decreased high-level IADLs;Decreased posture;Decreased balance;Decreased fine motor control;Decreased strength  Assessment: Pt. completed grooming and dressing skills while sitting EOB with max assist. Pt. displayed elevated BP and increased heart rate with activity. Nursing present during treatment. Pt. returned to bed after tolerating sittin EOB x30 min with SBA. Skilled OT warranted to promote I/safety to return pt to prior living arrangement with assist as needed. Prognosis: Fair  OT Education: OT Role;Transfer Training;Energy Conservation;Plan of Care;ADL Adaptive Strategies  Patient Education: Pt. educated on safety awareness with use of RW and orientation of each task/surroundings. Barriers to Learning: vision impairment  REQUIRES OT FOLLOW UP: Yes  Activity Tolerance  Activity Tolerance: Treatment limited secondary to medical complications (free text); Treatment limited secondary to agitation  Activity Tolerance: fair-  Safety Devices  Safety Devices in place: Yes  Type of devices: All fall risk precautions in place; Bed alarm in place;Call light within reach; Left in bed;Gait belt;Nurse notified         Patient Diagnosis(es): The primary encounter diagnosis was Altered mental status, unspecified altered mental status type. A diagnosis of Acute cystitis with hematuria was also pertinent to this visit. has a past medical history of Hip fracture ([facility] [address] has a past surgical history that includes eye surgery. 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 agreeable for treatment  Response to previous treatment: Patient with no complaints from previous session  Family / Caregiver Present: No  Subjective  Subjective: \"Oh no that THERAPY! \", \" [facility] Spore me! \"  General Comment  Comments: Pt. supine in bed upon arrival to room      Orientation  Orientation  Overall Orientation Status: Within Functional Limits  Objective    ADL  Feeding: Stand by assistance  Grooming: Minimal assistance  UE Dressing: Moderate assistance  LE Dressing: Maximum assistance;Dependent/Total  Toileting: None  Additional Comments: Max/dep for modi bag management through pant leg, pull to waist and tie. Balance  Sitting Balance: Stand by assistance  Standing Balance: Minimal assistance (min assist x2)  Standing Balance  Time: 1-2 min  Activity: ADL transfer  Comment: with use of walker  Functional Mobility  Functional - Mobility Device: Rolling Walker  Activity: Other (sit to stand from EOB to pull pants up to waist with use of walker)  Assist Level: Minimal assistance (min assist x2)  Functional Mobility Comments: Pt. required max verbal cues on breathing tech during functional task. However, pt voiced no c/o lightheadness, dizziness, chest pain. Pt. became agitated with frequent questions about status d/t elevated BP, and heart rate.   Bed mobility  Rolling to Left: Stand by assistance (with much time and cues on tech)  Rolling to Right: Stand by assistance (with much time and cues on proper tech)  Supine to Sit: Stand by assistance  Sit to Supine: Stand by assistance  Scooting: Minimal assistance  Comment: Pt. requires frequent cues for orientation to surrounds and task at hand d/t legally blind. With activity today, pt experiencing increased heart rate and fluctuating BP. Nursing aware and present during treatment. Transfers  Stand Step Transfers: Minimal assistance;2 Person assistance  Sit to stand: Minimal assistance;2 Person assistance  Stand to sit: Minimal assistance;2 Person assistance  Transfer Comments: Functional transfers require min assist x2 to manage lines, IV, guide walker, and pt safety. Sit to stand completed with elevated BP and pt instructed to return to sitting EOB for safety       Cognition  Overall Cognitive Status: Exceptions  Arousal/Alertness: Appropriate responses to stimuli  Following Commands: Follows multistep commands with repitition; Follows multistep commands with increased time  Attention Span: Appears intact  Memory: Appears intact  Safety Judgement: Decreased awareness of need for safety;Decreased awareness of need for assistance  Problem Solving: Decreased awareness of errors;Assistance required to identify errors made;Assistance required to correct errors made  Insights: Decreased awareness of deficits  Initiation: Requires cues for some  Sequencing: Requires cues for some     Perception  Overall Perceptual Status: [facility]        Plan   Plan  Times per week: 4-5x/wk 1x/day as katelynn  Times per day: Daily  Current Treatment Recommendations: Strengthening,Endurance Training,Balance Training,Functional Mobility Training,Safety Education & Training,Pain Management,Home Management Training,Self-Care / ADL,Equipment Evaluation, Education, & procurement,Patient/Caregiver Education & Training  Plan Comment: Cont with stated POC     AM-PAC Score        AM-PAC Inpatient Daily Activity Raw Score: 12 (04/15/22 1108)  AM-PAC Inpatient ADL T-Scale Score : 30.6 (04/15/22 1108)  ADL Inpatient CMS 0-100% Score: 66.57 (04/15/22 1108)  ADL Inpatient CMS G-Code Modifier : CL (04/15/22 1108)    Goals  Short term goals  Time Frame for Short term goals: By discharge, pt to demo  Short term goal 1: ADL transfers and functional mobility to SBA with use of AD as needed. Short term goal 2: increased B UE strength by 1/2 grade to assist with functional tasks/ I with simple B UE HEP. Short term goal 3: toileting to SBA with use of AD/grab bars /BSC as needed. Short term goal 4: UB ADLs to Set up and LB ADLs to Min A with use of AD/AE as needed. Short term goal 5: bed mobility to Mod I with use of bedrails as needed. Long term goals  Long term goal 1: Pt to be I with fall prevention education, EC/WS tech, recommendations for AE/discharge with use of handouts as needed. Patient Goals   Patient goals : To go home! Therapy Time   Individual Concurrent Group Co-treatment   Time In 0910         Time Out 7305         Minutes 76           Co-treatment with PT warranted secondary to decreased safety and independence requiring 2 skilled therapy professionals to address individual discipline's goals. OT addressing sitting balance for increased ADL performance, sitting/activity tolerance and functional mobility for ADL transfers.        CARLO Bose

## 2022-04-15 NOTE — CARE COORDINATION
Social work: authorization obtained for [employer]. Potential dc today to [facility], transport request faxed to [phone] [facility] on \"will call. \"  HENS completed.

## 2022-04-16 VITALS
DIASTOLIC BLOOD PRESSURE: 92 MMHG | WEIGHT: 131.5 LBS | HEART RATE: 81 BPM | TEMPERATURE: 97.7 F | BODY MASS INDEX: 18.41 KG/M2 | RESPIRATION RATE: 27 BRPM | SYSTOLIC BLOOD PRESSURE: 150 MMHG | OXYGEN SATURATION: 90 % | HEIGHT: 71 IN

## 2022-04-16 LAB
GLUCOSE BLD-MCNC: 157 MG/DL (ref 75–110)
GLUCOSE BLD-MCNC: 201 MG/DL (ref 75–110)

## 2022-04-16 PROCEDURE — 6370000000 HC RX 637 (ALT 250 FOR IP): Performed by: NURSE PRACTITIONER

## 2022-04-16 PROCEDURE — 82947 ASSAY GLUCOSE BLOOD QUANT: CPT

## 2022-04-16 PROCEDURE — 6370000000 HC RX 637 (ALT 250 FOR IP): Performed by: STUDENT IN AN ORGANIZED HEALTH CARE EDUCATION/TRAINING PROGRAM

## 2022-04-16 PROCEDURE — 2700000000 HC OXYGEN THERAPY PER DAY

## 2022-04-16 PROCEDURE — 94761 N-INVAS EAR/PLS OXIMETRY MLT: CPT

## 2022-04-16 PROCEDURE — 2580000003 HC RX 258: Performed by: INTERNAL MEDICINE

## 2022-04-16 PROCEDURE — 6360000002 HC RX W HCPCS: Performed by: NURSE PRACTITIONER

## 2022-04-16 PROCEDURE — 99239 HOSP IP/OBS DSCHRG MGMT >30: CPT | Performed by: FAMILY MEDICINE

## 2022-04-16 RX ORDER — CEPHALEXIN 500 MG/1
500 CAPSULE ORAL 2 TIMES DAILY
Qty: 8 CAPSULE | Refills: 0 | Status: SHIPPED | OUTPATIENT
Start: 2022-04-16 | End: 2022-04-20

## 2022-04-16 RX ADMIN — Medication 2000 UNITS: at 09:32

## 2022-04-16 RX ADMIN — PREDNISONE 40 MG: 20 TABLET ORAL at 09:33

## 2022-04-16 RX ADMIN — METFORMIN HYDROCHLORIDE 850 MG: 850 TABLET ORAL at 09:32

## 2022-04-16 RX ADMIN — SODIUM CHLORIDE: 9 INJECTION, SOLUTION INTRAVENOUS at 09:41

## 2022-04-16 RX ADMIN — TAMSULOSIN HYDROCHLORIDE 0.8 MG: 0.4 CAPSULE ORAL at 09:32

## 2022-04-16 RX ADMIN — INSULIN LISPRO 1 UNITS: 100 INJECTION, SOLUTION INTRAVENOUS; SUBCUTANEOUS at 09:33

## 2022-04-16 RX ADMIN — ENOXAPARIN SODIUM 40 MG: 100 INJECTION SUBCUTANEOUS at 09:33

## 2022-04-16 RX ADMIN — FINASTERIDE 5 MG: 5 TABLET, FILM COATED ORAL at 09:32

## 2022-04-16 RX ADMIN — SERTRALINE 25 MG: 25 TABLET, FILM COATED ORAL at 09:41

## 2022-04-16 ASSESSMENT — ENCOUNTER SYMPTOMS
CHOKING: 0
VOMITING: 0
WHEEZING: 0
SINUS PRESSURE: 0
DIARRHEA: 0
CONSTIPATION: 0
ABDOMINAL PAIN: 0
NAUSEA: 0
SHORTNESS OF BREATH: 0
COUGH: 0
CHEST TIGHTNESS: 0
RHINORRHEA: 0
VOICE CHANGE: 0
BACK PAIN: 0

## 2022-04-16 NOTE — PROGRESS NOTES
Dr Jelena Moore contacted, patient okay for discharge to [facility] from cardiology standpoint. No new orders at this time for discharge.

## 2022-04-16 NOTE — DISCHARGE SUMMARY
[facility]  Office: [address] Radha Nation Blood DO, Rajan Pedroza MD, Mignon Meckel MD, Siobhan Arango MD, Maribel Singh MD, Karen Michael MD, Shayla Sotelo MD, Brianna Peraza MD, Uri Antunez MD, Jamila Watson MD, Nanda Rodriguez DO, Henna Betancur MD, Georgina Delcid MD, Kourtney Craig DO, Luis Fernando Wilde MD,  Jose Alberto Jacobs DO, Keke Guevara MD, Jeison Bowling MD, Marina Han [facility], [facility] CNP, Anuradha Starjacinto CNP, Baby Ashlee CNS, Lajuan jose Merritt CNP, Jennifer Faith CNP, Bennett Price CNP, Allyssa Arias CNP, Fer Weeks CNP, Emily Stewart PA-C, Aliya Burris CNP, Cassidy Ball CNP, Leila Robles CNP, Maximino Wang CNP, Ernestine Osborne [facility], Caryle Poet, [address]      Discharge Summary     Patient ID: Kelli Schilling  :  1940   MRN: [id number]     ACCOUNT:  [de-identified]   Patient Location : [address] [place] [address]  Patient's PCP: Audi Morelos DO  Admit Date: 2022   Discharge Date:  22     Length of Stay: 8  Code Status:  Full Code  Admitting Physician: Vicky Allen MD  Discharge Physician: Siobhan Arango MD     Active Discharge Diagnosis :     Primary Problem  UTI (urinary tract infection)      Hospital Problems  Active Hospital Problems    Diagnosis Date Noted    Pulmonary emphysema ([facility] [J43.9]     COPD exacerbation ([address] [J44.1]     Secondary spontaneous pneumothorax [J93.12]     Acute respiratory failure with hypoxia ([address] [J96.01] 04/10/2022    Moderate protein-calorie malnutrition ([address] [E44.0] 2022    Toxic metabolic encephalopathy [P62.3] 2022    Tobacco abuse [Z72.0] 2022    UTI (urinary tract infection) [N39.0]     Benign non-nodular prostatic hyperplasia with lower urinary tract symptoms [N40.1] 2017    Advanced open-angle glaucoma [H40.10X0] 2016    Essential hypertension [I10]     Type 2 diabetes mellitus, without long-term current use of insulin ([facility] [E11.9]        Admission Condition:  fair     Discharged Condition: stable    Hospital Stay:     Hospital Course:  Michell Brito is a [de-identified] y.o. male who was admitted for the management of   UTI (urinary tract infection) , presented to ER with Altered Mental Status and Urinary Tract Infection  Patient came to hospital with altered mental status. Family found patient more confused for 2 days. He has history of chronic urinary retention with indwelling Worley catheter. Patient has his catheter changed every month. On initial evaluation patient was oriented to place but uncooperative. He also reported pain in scrotal area. Patient has history of COPD and had been started on O2 supplement recently for chronic respiratory failure. He is current smoker. Patient is diabetic and takes Metformin. Patient was started on antibiotics for pneumonia. He had swallow study which showed laryngeal penetration without aspiration. Patient was started modified dysphagia diet. Patient had pneumothorax on right apical on 4/12/2022 treated conservatively and remained stable. Patient also had brief afib with RVR and converted spontaneously in < 24 Hrs . Echo showed preserved EF . Cardiology recommended low dose betablocker and outpatient follow up. Significant therapeutic interventions:   1. Acute metabolic encephalopathy secondary to catheter associated UTI- antibiotics. -Resolved  2. Acute respiratory failure secondary to COPD exacerbation  -started on home O2 1 week before. Continued supplement. 3. Right apical pneumothorax -treated conservatively. 4. New onset Afib . Preserved EF. Back in NSR ,   5. Dysphagia -modified dysphagia diet with moderate thick liquids. 6. BPH with chronic urinary retention -chronic indwelling Worley  7. Type 2 diabetes mellitus -on Metformin at home. Humalog as needed.       Significant Diagnostic Studies:   Labs / Micro:/Radiology  Recent Labs     04/15/22  1103 Date: 4/13/2022  Stable appearance of a tiny right apical pneumothorax     XR CHEST PORTABLE    Result Date: 4/12/2022  Slightly smaller apical pneumothorax. No other interval change. XR CHEST PORTABLE    Result Date: 4/10/2022  1. Bibasilar airspace opacities potentially due to atelectasis, scarring, aspiration, and/or pneumonia. 2. Possible trace right pleural effusion. CT CHEST PULMONARY EMBOLISM W CONTRAST    Result Date: 4/11/2022  1. Age-indeterminate compression deformity of T5. Correlate for back pain and point tenderness at this level. Further evaluation with MRI is recommended assuming there are no contraindications to MRI. 2. No evidence for central pulmonary embolus. Evaluation of the lobar pulmonary arterial vasculature and beyond is limited due to factors detailed above. 3. Mild cardiomegaly. Coronary artery disease. Atherosclerotic calcification of the aorta. 4. Mild-to-moderate emphysema. Bilateral lower lobe airspace consolidation and respiratory motion, atelectasis and/or infiltrate. Follow-up is recommended to document resolution. 5. Bilateral partially visualized renal cysts. Confirmation with renal ultrasound recommended. Bilateral punctate renal vascular calcifications and/or nonobstructing punctate renal calculi. FL MODIFIED BARIUM SWALLOW W VIDEO    Result Date: 4/11/2022  Swallowing mechanism assessed with preliminary findings as above. Deep laryngeal penetration demonstrated without aspiration. Please see separate speech pathology report for full discussion of findings and recommendations. Consultations:    Consults:     Final Specialist Recommendations/Findings:   IP CONSULT TO HOSPITALIST  IP CONSULT TO UROLOGY  IP CONSULT TO NEUROLOGY  IP CONSULT TO PULMONOLOGY  IP CONSULT TO CARDIOLOGY      The patient was seen and examined on day of discharge and this discharge summary is in conjunction with any daily progress note from day of discharge.     Discharge plan:     Disposition: Skilled Nursing facility. Physician Follow Up:     [facility]  [address], DO  [address]  [facility]  [phone]    Schedule an appointment as soon as possible for a visit in 3 days      Gaurang Laguerre MD  [address] [facility] [address] [facility] [address] [facility]  [phone]    In 1 week  modi exchange    Dona Villar MD  [address] [facility]  [phone]    In 1 week  AFib RVR       Requiring Further Evaluation/Follow Up POST HOSPITALIZATION/Incidental Findings: follow up with PCP and cardiology. Diet: cardiac diet    Activity: As tolerated. Instructions to Patient: PT OT     Discharge Medications:      Medication List      START taking these medications    budesonide-formoterol 160-4.5 MCG/ACT Aero  Commonly known as: Symbicort  Inhale 2 puffs into the lungs 2 times daily     cephALEXin 500 MG capsule  Commonly known as: KEFLEX  Take 1 capsule by mouth 2 times daily for 4 days     ipratropium-albuterol 0.5-2.5 (3) MG/3ML Soln nebulizer solution  Commonly known as: DUONEB  Inhale 3 mLs into the lungs every 4 hours as needed for Shortness of Breath     metoprolol tartrate 25 MG tablet  Commonly known as: LOPRESSOR  Take 0.5 tablets by mouth 2 times daily     predniSONE 20 MG tablet  Commonly known as: DELTASONE  Take 2 tablets by mouth daily for 5 days        CHANGE how you take these medications    tamsulosin 0.4 MG capsule  Commonly known as: FLOMAX  What changed: Another medication with the same name was removed. Continue taking this medication, and follow the directions you see here.         CONTINUE taking these medications    acetaminophen 500 MG tablet  Commonly known as: TYLENOL     calcium citrate 950 (200 Ca) MG tablet  Commonly known as: CALCITRATE     Cholecalciferol 50 MCG (2000 UT) Tabs  Take 1 tablet by mouth daily     finasteride 5 MG tablet  Commonly known as: PROSCAR     latanoprost 0.005 % ophthalmic solution  Commonly known as: XALATAN     metFORMIN 850 MG tablet  Commonly known as: GLUCOPHAGE  Take 1 tablet by mouth 2 times daily (with meals)     sertraline 25 MG tablet  Commonly known as: ZOLOFT  Take 1 tablet by mouth daily           Where to Get Your Medications      These medications were sent to [address] [place] [address] [address], [facility] [address]    Phone: [phone]   · budesonide-formoterol 160-4.5 MCG/ACT Aero  · ipratropium-albuterol 0.5-2.5 (3) MG/3ML Soln nebulizer solution  · predniSONE 20 MG tablet     These medications were sent to [facility] [address] [facility], [address]  [address] [facility], [address] [facility] [address]    Phone: [phone]   · cephALEXin 500 MG capsule  · metoprolol tartrate 25 MG tablet         Time Spent on discharge is  33 mins in patient examination, evaluation, counseling as well as medication reconciliation, prescriptions for required medications, discharge plan and follow up. Electronically signed by   Dakotah Turk MD  4/16/2022        Thank you Dr. Noe Breaux DO for the opportunity to be involved in this patient's care.

## 2022-04-16 NOTE — CARE COORDINATION
SOCIAL WORK; called pt's daughter [place] to advise of transfer to [employer] today. She already was aware and reminded her of the Medicare rights letter in the envelope that her dad received and it was explained to him but his vision is not good. She did not have any questions or concerns at this time. Faxed amira to snf. Texted admissions to advise of time for transfer also. Hens is completed.  Roque ramirez

## 2022-04-16 NOTE — PLAN OF CARE
Problem: Falls - Risk of:  Goal: Will remain free from falls  Description: Will remain free from falls  4/16/2022 0427 by Kam Michael RN  Outcome: Ongoing  4/15/2022 1459 by Miriam Maier RN  Outcome: Ongoing  Goal: Absence of physical injury  Description: Absence of physical injury  Outcome: Ongoing     Problem: Pain:  Goal: Pain level will decrease  Description: Pain level will decrease  Outcome: Ongoing  Goal: Control of acute pain  Description: Control of acute pain  Outcome: Ongoing  Goal: Control of chronic pain  Description: Control of chronic pain  4/16/2022 0427 by Kam Michael RN  Outcome: Ongoing  4/15/2022 1459 by Miriam Maier RN  Outcome: Ongoing     Problem: Skin Integrity:  Goal: Will show no infection signs and symptoms  Description: Will show no infection signs and symptoms  Outcome: Ongoing  Goal: Absence of new skin breakdown  Description: Absence of new skin breakdown  Outcome: Ongoing     Problem: Sensory:  Goal: General experience of comfort will improve  Description: General experience of comfort will improve  Outcome: Ongoing     Problem: Urinary Elimination:  Goal: Signs and symptoms of infection will decrease  Description: Signs and symptoms of infection will decrease  4/16/2022 0427 by Kam Michael RN  Outcome: Ongoing  4/15/2022 1459 by Miriam Maier RN  Outcome: Ongoing  Goal: Ability to reestablish a normal urinary elimination pattern will improve - after catheter removal  Description: Ability to reestablish a normal urinary elimination pattern will improve  Outcome: Ongoing  Goal: Complications related to the disease process, condition or treatment will be avoided or minimized  Description: Complications related to the disease process, condition or treatment will be avoided or minimized  Outcome: Ongoing     Problem: Coping:  Goal: Ability to remain calm will improve  Description: Ability to remain calm will improve  Outcome: Ongoing     Problem: Safety:  Goal: Ability to remain free from injury will improve  Description: Ability to remain free from injury will improve  Outcome: Ongoing     Problem: Self-Care:  Goal: Ability to participate in self-care as condition permits will improve  Description: Ability to participate in self-care as condition permits will improve  4/16/2022 0427 by Anne Garcia RN  Outcome: Ongoing  4/15/2022 1459 by Demar Samuel RN  Outcome: Ongoing

## 2022-04-16 NOTE — PROGRESS NOTES
Jarad Toro   Urology Progress Note            Subjective:Patient seen in conjunction with nursing staff, Follow-up urinary retention gross hematuria    Patient Vitals for the past 24 hrs:   BP Temp Temp src Pulse Resp SpO2   04/16/22 0401 -- -- -- -- (!) 36 96 %   04/16/22 0000 -- 97.7 °F (36.5 °C) Temporal -- -- --   04/15/22 2128 -- -- -- -- 18 97 %   04/15/22 2120 -- -- -- -- 17 98 %   04/15/22 2000 120/73 98.7 °F (37.1 °C) Temporal 58 21 95 %   04/15/22 1700 114/78 -- -- 68 24 --   04/15/22 1631 104/70 97.9 °F (36.6 °C) Oral 100 20 95 %   04/15/22 1546 112/85 -- -- 110 -- --   04/15/22 1500 111/86 -- -- 101 19 --   04/15/22 1252 119/73 98.7 °F (37.1 °C) Oral -- -- 97 %   04/15/22 1200 99/68 -- -- 93 18 --   04/15/22 1115 101/70 -- -- 91 17 100 %   04/15/22 1100 95/61 -- -- 116 18 --   04/15/22 1030 (!) 133/120 -- -- 127 18 --   04/15/22 1015 (!) 121/93 -- -- 124 19 --   04/15/22 1000 93/61 -- -- 145 19 --   04/15/22 0946 -- -- -- -- 19 95 %   04/15/22 0930 (!) 126/95 -- -- 118 19 --   04/15/22 0815 (!) 142/94 -- -- 62 20 --   04/15/22 0800 (!) 145/83 -- -- 60 12 --       Intake/Output Summary (Last 24 hours) at 4/16/2022 0531  Last data filed at 4/15/2022 1700  Gross per 24 hour   Intake 1558.69 ml   Output 2100 ml   Net -541.31 ml       Recent Labs     04/14/22  0403 04/15/22  1103   WBC 14.3* 10.9   HGB 12.2* 13.2   HCT 37.3* 41.1   MCV 92.1 92.6    294     Recent Labs     04/14/22  0403 04/15/22  1103    135   K 4.4 3.8    101   CO2 23 25   BUN 33* 25*   CREATININE 0.73 0.62*       No results for input(s): COLORU, PHUR, LABCAST, WBCUA, RBCUA, MUCUS, TRICHOMONAS, YEAST, BACTERIA, CLARITYU, SPECGRAV, LEUKOCYTESUR, UROBILINOGEN, BILIRUBINUR, BLOODU in the last 72 hours.     Invalid input(s): NITRATE, GLUCOSEUKETONESUAMORPHOUS    Additional Lab/culture results:    Physical Exam: Urine clear bladder decompressed no complaints about the Worley catheter no recurrent hematuria    Interval Imaging Findings:    Impression:    Patient Active Problem List   Diagnosis    Type 2 diabetes mellitus, without long-term current use of insulin ([address]    Essential hypertension    Advanced open-angle glaucoma    Benign non-nodular prostatic hyperplasia with lower urinary tract symptoms    Dry eyes, bilateral    Elevated PSA    Blindness of both eyes    Closed right hip fracture (HCC)    UTI (urinary tract infection)    Moderate protein-calorie malnutrition (HCC)    Toxic metabolic encephalopathy    Tobacco abuse    Acute respiratory failure with hypoxia (HCC)    Pulmonary emphysema (HCC)    COPD exacerbation ([address]    Secondary spontaneous pneumothorax       Plan: Maintain indwelling Worley    Tracy Willis MD  5:31 AM 4/16/2022

## 2022-04-16 NOTE — PROGRESS NOTES
Pulmonary Critical Care Progress Note       Patient seen for the follow up of continuous pneumothorax, COPD exacerbation    Subjective: no complaints    Examination:  Vitals: BP (!) 154/90   Pulse (!) 38   Temp 97.7 °F (36.5 °C) (Temporal)   Resp 19   Ht 5' 11\" (1.803 m)   Wt 131 lb 8 oz (59.6 kg)   SpO2 96%   BMI 18.34 kg/m²   General appearance: alert and cooperative with exam  Neck: No JVD  Lungs: Moderate to decreased air exchange, no wheezing, 96% on 3 l nc  Heart: SB 46  Abdomen: Soft, non tender, + BS  Extremities: no cyanosis or clubbing.  No significant edema    LABs:  CBC:   Recent Labs     04/14/22  0403 04/15/22  1103   WBC 14.3* 10.9   HGB 12.2* 13.2   HCT 37.3* 41.1    294     BMP:   Recent Labs     04/14/22  0403 04/15/22  1103    135   K 4.4 3.8   CO2 23 25   BUN 33* 25*   CREATININE 0.73 0.62*   LABGLOM >60 >60   GLUCOSE 238* 317*     ABG:  Lab Results   Component Value Date    FIO2 50.0 04/11/2022       Lab Results   Component Value Date    POCPH 7.458 04/11/2022    POCPCO2 34.3 04/11/2022    POCPO2 49.9 04/11/2022    POCHCO3 24.3 04/11/2022    PBEA 1 04/11/2022    RVBO8IES 87 04/11/2022    FIO2 50.0 04/11/2022     Radiology:  X-ray chest 4/15/2022      Impression:  · Acute hypoxic respiratory insufficiency  · Acute exacerbation of COPD/emphysema  · Spontaneous right pneumothorax  · Atelectasis/basilar consolidation, possible aspiration  · Active smoking  · Elevated D-dimer  · Encephalopathy, improved  · UTI with indwelling Worley  · Compression fracture at T5  · Legally blind    Recommendations:  · Oxygen via nasal cannula  · DuoNeb via nebulizer  · Pulmicort every 12 hours via nebulizer  · Start prednisone taper  · IV Rocephin per primary team  · Lower extremity Dopplers negative  · Diet per speech therapy recommendations  · Urology following  · Smoking cessation/nicotine patch  · DVT prophylaxis, Lovenox  · Discharge planning to [facility]  · Stable pulmonary wise    Plan of care discussed with Dr Domenic Nash, DESTINEE - CNP on 4/16/2022 at 1:23 PM

## 2022-04-16 NOTE — PROGRESS NOTES
Roberto Hoyt   Urology Progress Note            Subjective: Follow-up gross hematuria urinary retention    Patient Vitals for the past 24 hrs:   BP Temp Temp src Pulse Resp SpO2   04/16/22 0401 -- -- -- -- (!) 36 96 %   04/16/22 0000 -- 97.7 °F (36.5 °C) Temporal -- -- --   04/15/22 2128 -- -- -- -- 18 97 %   04/15/22 2120 -- -- -- -- 17 98 %   04/15/22 2000 120/73 98.7 °F (37.1 °C) Temporal 58 21 95 %   04/15/22 1700 114/78 -- -- 68 24 --   04/15/22 1631 104/70 97.9 °F (36.6 °C) Oral 100 20 95 %   04/15/22 1546 112/85 -- -- 110 -- --   04/15/22 1500 111/86 -- -- 101 19 --   04/15/22 1252 119/73 98.7 °F (37.1 °C) Oral -- -- 97 %   04/15/22 1200 99/68 -- -- 93 18 --   04/15/22 1115 101/70 -- -- 91 17 100 %   04/15/22 1100 95/61 -- -- 116 18 --   04/15/22 1030 (!) 133/120 -- -- 127 18 --   04/15/22 1015 (!) 121/93 -- -- 124 19 --   04/15/22 1000 93/61 -- -- 145 19 --       Intake/Output Summary (Last 24 hours) at 4/16/2022 0959  Last data filed at 4/16/2022 2098  Gross per 24 hour   Intake 1558.69 ml   Output 2250 ml   Net -691.31 ml       Recent Labs     04/14/22  0403 04/15/22  1103   WBC 14.3* 10.9   HGB 12.2* 13.2   HCT 37.3* 41.1   MCV 92.1 92.6    294     Recent Labs     04/14/22  0403 04/15/22  1103    135   K 4.4 3.8    101   CO2 23 25   BUN 33* 25*   CREATININE 0.73 0.62*       No results for input(s): COLORU, PHUR, LABCAST, WBCUA, RBCUA, MUCUS, TRICHOMONAS, YEAST, BACTERIA, CLARITYU, SPECGRAV, LEUKOCYTESUR, UROBILINOGEN, BILIRUBINUR, BLOODU in the last 72 hours.     Invalid input(s): NITRATE, GLUCOSEUKETONESUAMORPHOUS    Additional Lab/culture results:    Physical Exam: The patient has had no recurrent bleeding, tolerating the catheter well patient scheduled to see Dr. Melissa Sainz at the office for catheter removal, void trial, possible prostate surgery    Interval Imaging Findings:    Impression:    Patient Active Problem List   Diagnosis    Type 2 diabetes mellitus, without long-term current use of insulin ([address]    Essential hypertension    Advanced open-angle glaucoma    Benign non-nodular prostatic hyperplasia with lower urinary tract symptoms    Dry eyes, bilateral    Elevated PSA    Blindness of both eyes    Closed right hip fracture (HCC)    UTI (urinary tract infection)    Moderate protein-calorie malnutrition (HCC)    Toxic metabolic encephalopathy    Tobacco abuse    Acute respiratory failure with hypoxia (HCC)    Pulmonary emphysema (HCC)    COPD exacerbation ([address]    Secondary spontaneous pneumothorax       Plan:  We recommend to maintain indwelling Worley at  discharge    Raoul Hartmann MD  9:59 AM 4/16/2022

## 2022-04-16 NOTE — PROGRESS NOTES
[facility]  Office: [address], DO, Keerthi Sampson, DO, Cleveland Estrada, DO, Latonia Odonnell, DO, Aarti George MD, Issa Bettencourt MD, Matthew Sepulveda MD, Yaneth Mcdonald MD, Jun Topete MD, Jessica Muñiz MD, Adalid Estrada MD, Harmony Cervantes, DO, Barrett Singh, DO, Ari Olivia MD,  Delores Rhodes, DO, Betina Rose MD, Cornelius Higuera MD, Roberto Goncalves MD, [facility], DO, Ashley Angulo MD, Ulices Strickland MD, Ene Massey [facility], [facility], CNP, Chago Parish, CNP, Elier Swatrz, CNP, William Martinez, CNP, Edy Navarro, CNP, Rufus Rosa PA-C, Marc Oliveira DNP, Cynthia Kurtz, CNP, David Herman, CNP, Albina Damon, CNP, Cailin Oneil, CNS, Brit Lewis, [facility], Yimi Smith, CNP, Bre Ca, CNP, Kimberly Chong, [facility]      Daily Progress Note     Admit Date: 4/8/2022  Bed/Room No.  1107/1107-01  Admitting Physician : Matthew Sepulveda MD  Code Status :[facility] Day:  LOS: 8 days   Chief Complaint:     Chief Complaint   Patient presents with    Altered Mental Status    Urinary Tract Infection     Principal Problem:    UTI (urinary tract infection)  Active Problems:    Type 2 diabetes mellitus, without long-term current use of insulin ([address]    Essential hypertension    Advanced open-angle glaucoma    Benign non-nodular prostatic hyperplasia with lower urinary tract symptoms    Moderate protein-calorie malnutrition (HCC)    Toxic metabolic encephalopathy    Tobacco abuse    Acute respiratory failure with hypoxia ([address]    Pulmonary emphysema ([address]    COPD exacerbation ([address]    Secondary spontaneous pneumothorax  Resolved Problems:    * No resolved hospital problems. *    Subjective : Interval History/Significant events :  04/16/22    Patient is in NSR . He denies any nausea , vomiting. Breathing is stable on supplemental oxygen. He is eating and drinking OK. He wants to go .  Patient has been requiring assistance with ADL.   Vitals - Unstable afebrile  Labs -hyperglycemia 303, leukocytosis 14.3    Nursing notes , Consults notes reviewed. Overnight events and updates discussed with Nursing staff . Background History:         Sherman Dunn is [de-identified] y.o. male  Who was admitted to the hospital on 4/8/2022 for treatment of UTI (urinary tract infection). Patient came to hospital with altered mental status. Family found patient more confused for 2 days. He has history of chronic urinary retention with indwelling Worley catheter. Patient has his catheter changed every month. On initial evaluation patient was oriented to place but uncooperative. He also reported pain in scrotal area. Patient has history of COPD and had been started on O2 supplement recently for chronic respiratory failure. He is current smoker. Patient is diabetic and takes Metformin. Patient was started on antibiotics for pneumonia. He had swallow study which showed laryngeal penetration without aspiration. Patient was started modified dysphagia diet. Patient had pneumothorax on right apical on 4/12/2022 treated conservatively.     PMH:  Past Medical History:   Diagnosis Date    Hip fracture ([facility]       Allergies: No Known Allergies   Medications :  predniSONE, 40 mg, Oral, Daily  sodium chloride, 100 mL, IntraVENous, Once  budesonide, 0.5 mg, Nebulization, BID  insulin lispro, 0-6 Units, SubCUTAneous, TID WC  insulin lispro, 0-3 Units, SubCUTAneous, Nightly  fluticasone, 2 spray, Each Nostril, Daily  lidocaine, 5 mL, Topical, Once  latanoprost, 1 drop, Right Eye, Nightly  tamsulosin, 0.8 mg, Oral, Daily  metFORMIN, 850 mg, Oral, BID   Vitamin D, 2,000 Units, Oral, Daily  sertraline, 25 mg, Oral, Daily  sodium chloride flush, 5-40 mL, IntraVENous, 2 times per day  enoxaparin, 40 mg, SubCUTAneous, Daily  nicotine, 1 patch, TransDERmal, Daily  finasteride, 5 mg, Oral, Daily        Review of Systems   Review of Systems   Constitutional: Positive for fatigue. Negative for activity change, appetite change, fever and unexpected weight change. HENT: Negative for congestion, nosebleeds, rhinorrhea, sinus pressure, sneezing and voice change. Respiratory: Negative for cough, choking, chest tightness, shortness of breath and wheezing. Cardiovascular: Negative for chest pain, palpitations and leg swelling. Gastrointestinal: Negative for abdominal pain, constipation, diarrhea, nausea and vomiting. Genitourinary: Positive for difficulty urinating. Negative for dysuria, frequency, penile discharge and testicular pain. Musculoskeletal: Negative for back pain. Skin: Negative for rash. Neurological: Negative for dizziness, weakness, light-headedness and headaches. Hematological: Does not bruise/bleed easily. Psychiatric/Behavioral: Negative for agitation, behavioral problems, confusion, self-injury, sleep disturbance and suicidal ideas.      Objective :      Current Vitals : Temp: 97.7 °F (36.5 °C),  Pulse: 58, Resp: (!) 36, BP: 120/73, SpO2: 96 %  Last 24 Hrs Vitals   Patient Vitals for the past 24 hrs:   BP Temp Temp src Pulse Resp SpO2   04/16/22 0401 -- -- -- -- (!) 36 96 %   04/16/22 0000 -- 97.7 °F (36.5 °C) Temporal -- -- --   04/15/22 2128 -- -- -- -- 18 97 %   04/15/22 2120 -- -- -- -- 17 98 %   04/15/22 2000 120/73 98.7 °F (37.1 °C) Temporal 58 21 95 %   04/15/22 1700 114/78 -- -- 68 24 --   04/15/22 1631 104/70 97.9 °F (36.6 °C) Oral 100 20 95 %   04/15/22 1546 112/85 -- -- 110 -- --   04/15/22 1500 111/86 -- -- 101 19 --   04/15/22 1252 119/73 98.7 °F (37.1 °C) Oral -- -- 97 %   04/15/22 1200 99/68 -- -- 93 18 --   04/15/22 1115 101/70 -- -- 91 17 100 %   04/15/22 1100 95/61 -- -- 116 18 --   04/15/22 1030 (!) 133/120 -- -- 127 18 --   04/15/22 1015 (!) 121/93 -- -- 124 19 --   04/15/22 1000 93/61 -- -- 145 19 --   04/15/22 0946 -- -- -- -- 19 95 %   04/15/22 0930 (!) 126/95 -- -- 118 19 --   04/15/22 0815 (!) 142/94 -- -- 62 20 --   04/15/22 0800 (!) 145/83 -- -- 60 12 --     Intake / output   04/14 1901 - 04/16 0700  In: 1558.7 [I.V.:1558.7]  Out: 3200 [Urine:3200]  Physical Exam:  Physical Exam  Vitals and nursing note reviewed. Constitutional:       General: He is not in acute distress. Appearance: He is not diaphoretic. Interventions: Nasal cannula in place. HENT:      Head: Normocephalic and atraumatic. Nose:      Right Sinus: No maxillary sinus tenderness or frontal sinus tenderness. Left Sinus: No maxillary sinus tenderness or frontal sinus tenderness. Mouth/Throat:      Pharynx: No oropharyngeal exudate. Eyes:      General: No scleral icterus. Conjunctiva/sclera: Conjunctivae normal.      Pupils: Pupils are equal, round, and reactive to light. Neck:      Thyroid: No thyromegaly. Vascular: No JVD. Cardiovascular:      Rate and Rhythm: Regular rhythm. Tachycardia present. Pulses:           Dorsalis pedis pulses are 2+ on the right side and 2+ on the left side. Heart sounds: Normal heart sounds. No murmur heard. Pulmonary:      Effort: Pulmonary effort is normal.      Breath sounds: Normal breath sounds. No wheezing or rales. Abdominal:      Palpations: Abdomen is soft. There is no mass. Tenderness: There is no abdominal tenderness. Musculoskeletal:      Cervical back: Full passive range of motion without pain and neck supple. Lymphadenopathy:      Head:      Right side of head: No submandibular adenopathy. Left side of head: No submandibular adenopathy. Cervical: No cervical adenopathy. Skin:     General: Skin is warm. Neurological:      Mental Status: He is alert and oriented to person, place, and time. Motor: No tremor. Psychiatric:         Behavior: Behavior is cooperative.            Laboratory findings:    Recent Labs     04/14/22  0403 04/15/22  1103   WBC 14.3* 10.9   HGB 12.2* 13.2   HCT 37.3* 41.1    294     Recent Labs     04/14/22  0403 4/12/2022  Slightly smaller apical pneumothorax. No other interval change. XR CHEST PORTABLE    Result Date: 4/10/2022  1. Bibasilar airspace opacities potentially due to atelectasis, scarring, aspiration, and/or pneumonia. 2. Possible trace right pleural effusion. CT CHEST PULMONARY EMBOLISM W CONTRAST    Result Date: 4/11/2022  1. Age-indeterminate compression deformity of T5. Correlate for back pain and point tenderness at this level. Further evaluation with MRI is recommended assuming there are no contraindications to MRI. 2. No evidence for central pulmonary embolus. Evaluation of the lobar pulmonary arterial vasculature and beyond is limited due to factors detailed above. 3. Mild cardiomegaly. Coronary artery disease. Atherosclerotic calcification of the aorta. 4. Mild-to-moderate emphysema. Bilateral lower lobe airspace consolidation and respiratory motion, atelectasis and/or infiltrate. Follow-up is recommended to document resolution. 5. Bilateral partially visualized renal cysts. Confirmation with renal ultrasound recommended. Bilateral punctate renal vascular calcifications and/or nonobstructing punctate renal calculi. FL MODIFIED BARIUM SWALLOW W VIDEO    Result Date: 4/11/2022  Swallowing mechanism assessed with preliminary findings as above. Deep laryngeal penetration demonstrated without aspiration. Please see separate speech pathology report for full discussion of findings and recommendations. Clinical Course : stable  Assessment and Plan  :        1. Acute metabolic encephalopathy secondary to catheter associated UTI- antibiotics. -Resolved  2. Acute respiratory failure secondary to COPD exacerbation  -started on home O2 1 week before. Continued supplement. 3. Right apical pneumothorax -treated conservatively. 4. New onset Afib . Preserved EF. Back in NSR ,   5. Dysphagia -modified dysphagia diet with moderate thick liquids.   6. BPH with chronic urinary retention -chronic indwelling Worley  7. Type 2 diabetes mellitus -on Metformin at home. Humalog as needed. Discharge planing  to SNF      Continue to monitor vitals , Intake / output ,  Cell count , HGB , Kidney function, oxygenation  as indicated . Plan and updates discussed with patient ,  answers  explained to satisfaction. No family at bedside. Patient says that he lives with daughter and son in law .    Plan discussed with Staff Dian Mcclure RN     (Please note that portions of this note were completed with a voice recognition program. Efforts were made to edit the dictations but occasionally words are mis-transcribed.)      Yanet Farnsworth MD  4/16/2022

## 2022-04-16 NOTE — PLAN OF CARE
Problem: Falls - Risk of:  Goal: Will remain free from falls  Description: Will remain free from falls  4/16/2022 1421 by Alie Goldberg RN  Outcome: Ongoing  Note: Fall risk assessment completed. Patient instructed to use call light. Bed locked and in lowest position, side rails up 2/4, call light and bedside table within reach, clutter removed, and non-skid footwear on when pt out of bed. Hourly rounds will continue.       Problem: Falls - Risk of:  Goal: Absence of physical injury  Description: Absence of physical injury  4/16/2022 1421 by Alie Goldberg RN  Outcome: Ongoing     Problem: Pain:  Goal: Pain level will decrease  Description: Pain level will decrease  4/16/2022 1421 by Alie Goldberg RN  Outcome: Ongoing     Problem: Skin Integrity:  Goal: Will show no infection signs and symptoms  Description: Will show no infection signs and symptoms  4/16/2022 1421 by Alie Goldberg RN  Outcome: Ongoing     Problem: Sensory:  Goal: General experience of comfort will improve  Description: General experience of comfort will improve  4/16/2022 1421 by Alie Goldberg RN  Outcome: Ongoing     Problem: Urinary Elimination:  Goal: Signs and symptoms of infection will decrease  Description: Signs and symptoms of infection will decrease  4/16/2022 1421 by Alie Goldberg RN  Outcome: Ongoing     Problem: Urinary Elimination:  Goal: Ability to reestablish a normal urinary elimination pattern will improve - after catheter removal  Description: Ability to reestablish a normal urinary elimination pattern will improve  4/16/2022 1421 by Alie Goldberg RN  Outcome: Ongoing     Problem: Urinary Elimination:  Goal: Complications related to the disease process, condition or treatment will be avoided or minimized  Description: Complications related to the disease process, condition or treatment will be avoided or minimized  4/16/2022 1421 by Alie Goldberg RN  Outcome: Ongoing     Problem: Coping:  Goal: Ability to remain calm will improve  Description: Ability to remain calm will improve  4/16/2022 1421 by Amber Shay RN  Outcome: Ongoing     Problem: Safety:  Goal: Ability to remain free from injury will improve  Description: Ability to remain free from injury will improve  4/16/2022 1421 by Amber Shay RN  Outcome: Ongoing     Problem: Self-Care:  Goal: Ability to participate in self-care as condition permits will improve  Description: Ability to participate in self-care as condition permits will improve  4/16/2022 1421 by Amber Shay RN  Outcome: Ongoing

## 2022-04-16 NOTE — PROGRESS NOTES
Section of Cardiology  Progress Note      Date:  4/16/2022  Patient: Oletha Curling  Admission:  4/8/2022  2:52 AM  Admit DX: UTI (urinary tract infection) [N39.0]  Acute cystitis with hematuria [N30.01]  Altered mental status, unspecified altered mental status type [R41.82]  Age:  [de-identified] y.o., 1940     LOS: 8 days     Reason for evaluation:   atrial fibrillation      SUBJECTIVE:     The patient was seen and examined. Notes and labs reviewed. There were not complications over night. Patient remained in sinus rhythm and he is more on the bradycardic side however when I saw him his heart rate was 68 bpm.  The patient is pleasant and does not appear to be dyspneic. He does not report palpitation. No chest pain. No current cough. The patient is generally feeling gradually improving. OBJECTIVE:    Telemetry: Sinus  BP (!) 154/90   Pulse (!) 38   Temp 97.7 °F (36.5 °C) (Temporal)   Resp 17   Ht 5' 11\" (1.803 m)   Wt 131 lb 8 oz (59.6 kg)   SpO2 95%   BMI 18.34 kg/m²     Intake/Output Summary (Last 24 hours) at 4/16/2022 1057  Last data filed at 4/16/2022 0034  Gross per 24 hour   Intake 1558.69 ml   Output 2250 ml   Net -691.31 ml       EXAM:   CONSTITUTIONAL:  awake, alert, cooperative, no apparent distress, and appears cachectic and older than his stated age  [de-identified]: Normal jugular venous pulsations, no carotid bruits. Head is atraumatic, normocephalic. Eyes and oral mucosa are normal.  LUNGS: Good respiratory effort. No for increased work of breathing. On auscultation: diminished breath sounds bilaterally  CARDIOVASCULAR:  Normal apical impulse, regular rate and rhythm, normal S1 and S2, no S3 or S4,   ABDOMEN: Soft, nontender, nondistended. Bowel sounds present. SKIN: Warm and dry. EXTREMITIES: No lower extremity edema. Motor movement grossly intact. No cyanosis or clubbing.     Current Inpatient Medications:   predniSONE  40 mg Oral Daily    sodium chloride  100 mL IntraVENous Once    budesonide  0.5 mg Nebulization BID    insulin lispro  0-6 Units SubCUTAneous TID     insulin lispro  0-3 Units SubCUTAneous Nightly    fluticasone  2 spray Each Nostril Daily    lidocaine  5 mL Topical Once    latanoprost  1 drop Right Eye Nightly    tamsulosin  0.8 mg Oral Daily    metFORMIN  850 mg Oral BID     Vitamin D  2,000 Units Oral Daily    sertraline  25 mg Oral Daily    sodium chloride flush  5-40 mL IntraVENous 2 times per day    enoxaparin  40 mg SubCUTAneous Daily    nicotine  1 patch TransDERmal Daily    finasteride  5 mg Oral Daily       IV Infusions (if any):   sodium chloride 50 mL/hr at 04/16/22 0941    dextrose      sodium chloride Stopped (04/11/22 1654)       Diagnostics:   EKG: . ECHO: reviewed. Normal LV systolic function with some technical limitation due to lung interference  Ejection fraction: 60%  Stress Test: not obtained. Cardiac Angiography: not obtained. Labs:   CBC:   Recent Labs     04/14/22  0403 04/15/22  1103   WBC 14.3* 10.9   HGB 12.2* 13.2   HCT 37.3* 41.1    294     BMP:   Recent Labs     04/14/22  0403 04/15/22  1103    135   K 4.4 3.8   CO2 23 25   BUN 33* 25*   CREATININE 0.73 0.62*   LABGLOM >60 >60   GLUCOSE 238* 317*     No results found for: BNP  PT/INR: No results for input(s): PROTIME, INR in the last 72 hours. APTT:No results for input(s): APTT in the last 72 hours. CARDIAC ENZYMES:No results for input(s): CKTOTAL, CKMB, CKMBINDEX, TROPONINT in the last 72 hours. FASTING LIPID PANEL:  Lab Results   Component Value Date    HDL 80 10/30/2021    TRIG 78 10/30/2021     LIVER PROFILE:No results for input(s): AST, ALT, LABALBU in the last 72 hours. ASSESSMENT:  1. new onset atrial fibrillation with RVR  2. COPD exacerbation contributing to above  3. Encephalopathy, resolved  4. Spontaneous right-sided pneumothorax  5.   Legally blind     Patient Active Problem List   Diagnosis    Type 2 diabetes mellitus, without long-term current use of insulin ([facility]    Essential hypertension    Advanced open-angle glaucoma    Benign non-nodular prostatic hyperplasia with lower urinary tract symptoms    Dry eyes, bilateral    Elevated PSA    Blindness of both eyes    Closed right hip fracture (HCC)    UTI (urinary tract infection)    Moderate protein-calorie malnutrition (HCC)    Toxic metabolic encephalopathy    Tobacco abuse    Acute respiratory failure with hypoxia (HCC)    Pulmonary emphysema (HCC)    COPD exacerbation ([facility]    Secondary spontaneous pneumothorax       PLAN:    1. I will start low-dose beta-blocker with Lopressor 12.5 twice daily. 2. He is stable enough to transfer to [facility] and follow-up on as-needed basis. Please see orders. Discussed with patient and nursing.     Glenn Padilla MD, MD

## 2022-04-16 NOTE — PROGRESS NOTES
EMS came to  patient and transfer to [address] in [place]. All patient's belongings collected and moved with patient. Several attempts made to call report to nurse at [address], and either no answer or busy signal. Will continue to attempt to call report.

## 2022-04-25 ENCOUNTER — TELEPHONE (OUTPATIENT)
Dept: FAMILY MEDICINE CLINIC | Age: 82
End: 2022-04-25

## 2022-04-25 NOTE — TELEPHONE ENCOUNTER
*PCP OUT OF OFFICE*    You do not have any 40 minute apts for a discharge, would you like him to see another provider and then f/u with you or ok to take a SD for 20?

## 2022-04-25 NOTE — TELEPHONE ENCOUNTER
----- Message from Elia Melgar sent at 4/25/2022  9:38 AM EDT -----  Subject: Appointment Request    Reason for Call: Routine Hospital Follow Up    QUESTIONS  Type of Appointment? Established Patient  Reason for appointment request? Available appointments did not meet   patient need  Additional Information for Provider? Patients daughter called to schedule   a follow up for discharge from nursing home, states he needs seen within   7-10 days of discharge on 4/25/22 please call back asap to schedule,   daughter has limited availability to bring him.   ---------------------------------------------------------------------------  --------------  CALL BACK INFO  What is the best way for the office to contact you? OK to leave message on   voicemail  Preferred Call Back Phone Number? [phone]  ---------------------------------------------------------------------------  --------------  SCRIPT ANSWERS  Relationship to Patient? Other  Representative Name? Janet Jackson  Additional information verified (besides Name and Date of Birth)? Address  (Patient requests to see provider urgently. )? No  (Has the patient been discharged from the hospital within 2 business days   AND does not have a Telephone Encounter  Follow Up From [address] [place] [address]   documented in [address]?)? No  Do you have any questions for your primary care provider that need to be   answered prior to your appointment? (Use RN Triage if question pertains to   anything on the red flag list)? No  (Patient needs follow up visit after hospital discharge) Book first   available appointment within 7 days OF DISCHARGE, if no appt, proceed to   book the next available time slot within 14 days OF DISCHARGE AND Send   Message to Provider. [address] [facility] Follow Up appointment cannot be booked   beyond 14 Days and should result in a Message to Provider. ? Yes   Have you been diagnosed with, awaiting test results for, or told that you   are suspected of having COVID-19 (Coronavirus)?  (If patient has tested   negative or was tested as a requirement for work, school, or travel and   not based on symptoms, answer no)? No  Within the past 10 days have you developed any of the following symptoms   (answer no if symptoms have been present longer than 10 days or began   more than 10 days ago)? Fever or Chills, Cough, Shortness of breath or   difficulty breathing, Loss of taste or smell, Sore throat, Nasal   congestion, Sneezing or runny nose, Fatigue or generalized body aches   (answer no if pain is specific to a body part e.g. back pain), Diarrhea,   Headache? No  Have you had close contact with someone with COVID-19 in the last 7 days? No  (Service Expert  click yes below to proceed with [employer]ual   Scheduling)?  Yes

## 2022-05-04 ENCOUNTER — HOSPITAL ENCOUNTER (EMERGENCY)
Age: 82
Discharge: HOME | End: 2022-05-04
Attending: EMERGENCY MEDICINE
Payer: MEDICARE

## 2022-05-04 VITALS
SYSTOLIC BLOOD PRESSURE: 117 MMHG | BODY MASS INDEX: 17.78 KG/M2 | HEART RATE: 87 BPM | WEIGHT: 127 LBS | RESPIRATION RATE: 18 BRPM | DIASTOLIC BLOOD PRESSURE: 79 MMHG | OXYGEN SATURATION: 96 % | HEIGHT: 71 IN | TEMPERATURE: 98.2 F

## 2022-05-04 DIAGNOSIS — R33.8 ACUTE URINARY RETENTION: ICD-10-CM

## 2022-05-04 DIAGNOSIS — R31.9 URINARY TRACT INFECTION WITH HEMATURIA, SITE UNSPECIFIED: Primary | ICD-10-CM

## 2022-05-04 DIAGNOSIS — N39.0 URINARY TRACT INFECTION WITH HEMATURIA, SITE UNSPECIFIED: Primary | ICD-10-CM

## 2022-05-04 LAB
-: ABNORMAL
BACTERIA: ABNORMAL
BILIRUBIN URINE: NEGATIVE
COLOR: ABNORMAL
EPITHELIAL CELLS UA: ABNORMAL /HPF (ref 0–5)
GLUCOSE URINE: ABNORMAL
KETONES, URINE: NEGATIVE
LEUKOCYTE ESTERASE, URINE: ABNORMAL
NITRITE, URINE: NEGATIVE
PH UA: 5.5 (ref 5–8)
PROTEIN UA: ABNORMAL
RBC UA: ABNORMAL /HPF (ref 0–2)
SPECIFIC GRAVITY UA: 1.02 (ref 1–1.03)
TURBIDITY: ABNORMAL
URINE HGB: ABNORMAL
UROBILINOGEN, URINE: NORMAL
WBC UA: ABNORMAL /HPF (ref 0–5)

## 2022-05-04 PROCEDURE — 87077 CULTURE AEROBIC IDENTIFY: CPT

## 2022-05-04 PROCEDURE — 81001 URINALYSIS AUTO W/SCOPE: CPT

## 2022-05-04 PROCEDURE — 99283 EMERGENCY DEPT VISIT LOW MDM: CPT

## 2022-05-04 PROCEDURE — 87086 URINE CULTURE/COLONY COUNT: CPT

## 2022-05-04 PROCEDURE — 87186 SC STD MICRODIL/AGAR DIL: CPT

## 2022-05-04 PROCEDURE — 6370000000 HC RX 637 (ALT 250 FOR IP): Performed by: EMERGENCY MEDICINE

## 2022-05-04 RX ORDER — SULFAMETHOXAZOLE AND TRIMETHOPRIM 800; 160 MG/1; MG/1
1 TABLET ORAL 2 TIMES DAILY
Qty: 20 TABLET | Refills: 0 | Status: SHIPPED | OUTPATIENT
Start: 2022-05-04 | End: 2022-05-14

## 2022-05-04 RX ORDER — SULFAMETHOXAZOLE AND TRIMETHOPRIM 800; 160 MG/1; MG/1
1 TABLET ORAL ONCE
Status: COMPLETED | OUTPATIENT
Start: 2022-05-04 | End: 2022-05-04

## 2022-05-04 RX ADMIN — SULFAMETHOXAZOLE AND TRIMETHOPRIM 1 TABLET: 800; 160 TABLET ORAL at 03:47

## 2022-05-04 ASSESSMENT — ENCOUNTER SYMPTOMS
NAUSEA: 0
EYE DISCHARGE: 0
DIARRHEA: 0
VOMITING: 0
STRIDOR: 0
WHEEZING: 0
EYE REDNESS: 0
COUGH: 0
ABDOMINAL PAIN: 1
CONSTIPATION: 0
EYE PAIN: 0
SORE THROAT: 0
COLOR CHANGE: 0
SHORTNESS OF BREATH: 0

## 2022-05-04 ASSESSMENT — PAIN - FUNCTIONAL ASSESSMENT: PAIN_FUNCTIONAL_ASSESSMENT: 0-10

## 2022-05-04 ASSESSMENT — PAIN SCALES - GENERAL: PAINLEVEL_OUTOF10: 5

## 2022-05-04 NOTE — ED PROVIDER NOTES
[facility] ED  EMERGENCY DEPARTMENT ENCOUNTER      Pt Name: Meeta Monroe  MRN: [id number]  [place] 1940  Date of evaluation: 5/4/2022  Provider: Kita Morel MD    CHIEF COMPLAINT       Chief Complaint   Patient presents with    Urinary Retention       CRITICAL CARE TIME   Total Critical Care time was 10 minutes, excluding separately reportable procedures. There was a high probability of clinically significant/life threatening deterioration in the patient's condition which required my urgent intervention. HISTORY OF PRESENT ILLNESS  (Location/Symptom, Timing/Onset, Context/Setting, Quality, Duration, Modifying Factors, Severity.)   Meeta Monroe is a [de-identified] y.o. male who presents to the emergency department complaining of urinary retention. He daughter at bedside relates he was quite uncomfortable at home but he currently denies pain. No fever that they are aware of. No nausea or vomiting. They are here for pain and relates that his Worley was changed yesterday. So they thought it would be all right and good. But he is now having trouble including urinary retention which seems acute. Nursing Notes were reviewed. REVIEW OF SYSTEMS    (2-9 systems for level 4, 10 or more for level 5)     Review of Systems   Constitutional: Negative for activity change, appetite change, chills, fatigue and fever. HENT: Negative for congestion, ear pain and sore throat. Eyes: Negative for pain, discharge and redness. Respiratory: Negative for cough, shortness of breath, wheezing and stridor. Cardiovascular: Negative for chest pain. Gastrointestinal: Positive for abdominal pain. Negative for constipation, diarrhea, nausea and vomiting. Genitourinary: Positive for difficulty urinating. Negative for decreased urine volume. Retention   Musculoskeletal: Negative for arthralgias and myalgias. Skin: Negative for color change and rash.    Neurological: Negative for dizziness, weakness and headaches. Psychiatric/Behavioral: Negative for behavioral problems and confusion. Except as noted above the remainder of the review of systems was reviewed and negative. PAST MEDICAL HISTORY     Past Medical History:   Diagnosis Date    Hip fracture ([facility] [address]        SURGICAL HISTORY       Past Surgical History:   Procedure Laterality Date    EYE SURGERY         CURRENT MEDICATIONS       Previous Medications    ACETAMINOPHEN (TYLENOL) 500 MG TABLET    Take 1,000 mg by mouth every 6 hours as needed    BUDESONIDE-FORMOTEROL (SYMBICORT) 160-4.5 MCG/ACT AERO    Inhale 2 puffs into the lungs 2 times daily    CALCIUM CITRATE (CALCITRATE) 950 (200 CA) MG TABLET    Take 400 mg by mouth 2 times daily     CHOLECALCIFEROL 50 MCG ( UT) TABS    Take 1 tablet by mouth daily    FINASTERIDE (PROSCAR) 5 MG TABLET    Take 5 mg by mouth daily    IPRATROPIUM-ALBUTEROL (DUONEB) 0.5-2.5 (3) MG/3ML SOLN NEBULIZER SOLUTION    Inhale 3 mLs into the lungs every 4 hours as needed for Shortness of Breath    LATANOPROST (XALATAN) 0.005 % OPHTHALMIC SOLUTION    Administer 1 drop to the right eye nightly. METFORMIN (GLUCOPHAGE) 850 MG TABLET    Take 1 tablet by mouth 2 times daily (with meals)    METOPROLOL TARTRATE (LOPRESSOR) 25 MG TABLET    Take 0.5 tablets by mouth 2 times daily    SERTRALINE (ZOLOFT) 25 MG TABLET    Take 1 tablet by mouth daily    TAMSULOSIN (FLOMAX) 0.4 MG CAPSULE    Take 0.4 mg by mouth at bedtime       ALLERGIES     Patient has no known allergies. FAMILY HISTORY           Problem Relation Age of Onset    High Blood Pressure Mother     Heart Disease Mother     Kidney Disease Mother     Other Sister         obesity     Family Status   Relation Name Status    Mother  Alive    Father      Sister  Alive        SOCIAL HISTORY      reports that he has been smoking cigarettes. He has a 60.00 pack-year smoking history. He has never used smokeless tobacco. He reports current drug use.  Drug: Marijuana ([place]). He reports that he does not drink alcohol. PHYSICAL EXAM    (up to 7 for level 4, 8 or more for level 5)     ED Triage Vitals   BP Temp Temp Source Pulse Resp SpO2 Height Weight   05/04/22 0244 05/04/22 0244 05/04/22 0244 05/04/22 0242 05/04/22 0242 05/04/22 0242 05/04/22 0242 05/04/22 0242   117/79 98.2 °F (36.8 °C) Oral 87 18 96 % 5' 11\" (1.803 m) 127 lb (57.6 kg)     Physical Exam  Vitals and nursing note reviewed. Constitutional:       General: He is not in acute distress. Appearance: He is well-developed. He is not ill-appearing, toxic-appearing or diaphoretic. HENT:      Head: Normocephalic and atraumatic. Right Ear: External ear normal.      Left Ear: External ear normal.      Nose: Nose normal.      Mouth/Throat:      Mouth: Mucous membranes are moist.   Eyes:      General:         Right eye: No discharge. Left eye: No discharge. Conjunctiva/sclera: Conjunctivae normal.      Pupils: Pupils are equal, round, and reactive to light. Cardiovascular:      Rate and Rhythm: Normal rate and regular rhythm. Heart sounds: Normal heart sounds. No murmur heard. Pulmonary:      Effort: Pulmonary effort is normal. No respiratory distress. Breath sounds: Normal breath sounds. No wheezing, rhonchi or rales. Chest:      Chest wall: No tenderness. Abdominal:      General: Bowel sounds are normal. There is no distension. Palpations: Abdomen is soft. There is no mass. Tenderness: There is no abdominal tenderness. There is no guarding or rebound. Musculoskeletal:         General: Normal range of motion. Cervical back: Normal range of motion and neck supple. Right lower leg: No edema. Left lower leg: No edema. Skin:     General: Skin is warm. Findings: No rash. Neurological:      General: No focal deficit present. Mental Status: He is alert and oriented to person, place, and time. Mental status is at baseline.       Motor: No abnormal muscle tone. Psychiatric:         Mood and Affect: Mood normal.         Behavior: Behavior normal.         DIAGNOSTIC RESULTS     EKG: All EKG's are interpreted by the Emergency Department Physician who either signs or Co-signs this chart in the absence of a cardiologist.    none    RADIOLOGY:   Non-plain film images such as CT, Ultrasound and MRI are read by the radiologist. Plain radiographic images are visualized and preliminarily interpreted by the emergency physician with the below findings:    Interpretation per the Radiologist below, if available at the time of this note:    No orders to display         ED BEDSIDE ULTRASOUND:   Performed by ED Physician - none    LABS:  Labs Reviewed   URINALYSIS WITH REFLEX TO CULTURE - Abnormal; Notable for the following components:       Result Value    Color, UA Red (*)     Turbidity UA Cloudy (*)     Glucose, Ur 2+ (*)     Urine Hgb 3+ (*)     Protein, UA TRACE (*)     Leukocyte Esterase, Urine SMALL (*)     All other components within normal limits   MICROSCOPIC URINALYSIS - Abnormal; Notable for the following components:    Bacteria, UA MANY (*)     All other components within normal limits   CULTURE, URINE       All other labs were within normal range or not returned as of this dictation. EMERGENCY DEPARTMENT COURSE and DIFFERENTIAL DIAGNOSIS/MDM:   Vitals:    Vitals:    05/04/22 0242 05/04/22 0244   BP:  117/79   Pulse: 87    Resp: 18    Temp:  98.2 °F (36.8 °C)   TempSrc:  Oral   SpO2: 96%    Weight: 127 lb (57.6 kg)    Height: 5' 11\" (1.803 m)      We did discuss irrigation and urine sample to lab. They are agreeable. If we cannot irrigate we will have to change out the urine bag and they verbalized understanding. Patient does seem to have 850 mL out after changing of the Worley. He also seems to have a UTI so we will treat for this as well. CONSULTS:  None    PROCEDURES:  None    FINAL IMPRESSION      1.  Urinary tract infection with hematuria, site unspecified    2.  Acute urinary retention          DISPOSITION/PLAN   DISPOSITION Decision To Discharge 05/04/2022 03:38:56 AM      PATIENT REFERRED TO:  Cinthia Boles DO  [address]  [place] [address] [place] [address]  [phone]    Call in 1 day        DISCHARGE MEDICATIONS:  New Prescriptions    SULFAMETHOXAZOLE-TRIMETHOPRIM (BACTRIM DS) 800-160 MG PER TABLET    Take 1 tablet by mouth 2 times daily for 10 days       (Please note that portions of this note were completed with a voice recognition program.  Efforts were made to edit the dictations but occasionally words are mis-transcribed.)    Berto Rollins MD  Attending Emergency Physician        Berto Rollins MD  05/04/22 3575

## 2022-05-05 ENCOUNTER — CARE COORDINATION (OUTPATIENT)
Dept: CARE COORDINATION | Age: 82
End: 2022-05-05

## 2022-05-05 LAB
CULTURE: ABNORMAL
SPECIMEN DESCRIPTION: ABNORMAL

## 2022-05-06 ENCOUNTER — TELEPHONE (OUTPATIENT)
Dept: FAMILY MEDICINE CLINIC | Age: 82
End: 2022-05-06

## 2022-05-19 ENCOUNTER — CARE COORDINATION (OUTPATIENT)
Dept: CARE COORDINATION | Age: 82
End: 2022-05-19

## 2022-05-19 NOTE — CARE COORDINATION
ACM attempted outreach for updates with potential SNF placement, no answer and LVM with contact information  Will discharge from [address] [place] [address] at this time and will follow for any needs in future.

## 2022-06-09 ENCOUNTER — HOSPITAL ENCOUNTER (OUTPATIENT)
Age: 82
Setting detail: SPECIMEN
Discharge: HOME | End: 2022-06-09
Payer: MEDICARE

## 2022-06-09 LAB
ANION GAP SERPL CALCULATED.3IONS-SCNC: 10 MMOL/L (ref 9–17)
BUN BLDV-MCNC: 26 MG/DL (ref 8–23)
BUN/CREAT BLD: 31 (ref 9–20)
CALCIUM SERPL-MCNC: 8.9 MG/DL (ref 8.6–10.4)
CHLORIDE BLD-SCNC: 103 MMOL/L (ref 98–107)
CO2: 28 MMOL/L (ref 20–31)
CREAT SERPL-MCNC: 0.84 MG/DL (ref 0.7–1.2)
GFR AFRICAN AMERICAN: >60 ML/MIN
GFR NON-AFRICAN AMERICAN: >60 ML/MIN
GFR SERPL CREATININE-BSD FRML MDRD: ABNORMAL ML/MIN/{1.73_M2}
GLUCOSE BLD-MCNC: 122 MG/DL (ref 70–99)
HCT VFR BLD CALC: 39.4 % (ref 40.7–50.3)
HEMOGLOBIN: 12.6 G/DL (ref 13–17)
MCH RBC QN AUTO: 31 PG (ref 25.2–33.5)
MCHC RBC AUTO-ENTMCNC: 32 G/DL (ref 28.4–34.8)
MCV RBC AUTO: 96.8 FL (ref 82.6–102.9)
NRBC AUTOMATED: 0.3 PER 100 WBC
PDW BLD-RTO: 15.1 % (ref 11.8–14.4)
PLATELET # BLD: 254 K/UL (ref 138–453)
PMV BLD AUTO: 10.9 FL (ref 8.1–13.5)
POTASSIUM SERPL-SCNC: 4.6 MMOL/L (ref 3.7–5.3)
RBC # BLD: 4.07 M/UL (ref 4.21–5.77)
SODIUM BLD-SCNC: 141 MMOL/L (ref 135–144)
WBC # BLD: 6.8 K/UL (ref 3.5–11.3)

## 2022-06-09 PROCEDURE — 36415 COLL VENOUS BLD VENIPUNCTURE: CPT

## 2022-06-09 PROCEDURE — 85027 COMPLETE CBC AUTOMATED: CPT

## 2022-06-09 PROCEDURE — P9603 ONE-WAY ALLOW PRORATED MILES: HCPCS

## 2022-06-09 PROCEDURE — 80048 BASIC METABOLIC PNL TOTAL CA: CPT

## 2022-06-13 ENCOUNTER — HOSPITAL ENCOUNTER (OUTPATIENT)
Age: 82
Setting detail: SPECIMEN
Discharge: HOME | End: 2022-06-13
Payer: MEDICARE

## 2022-06-13 PROCEDURE — 87184 SC STD DISK METHOD PER PLATE: CPT

## 2022-06-13 PROCEDURE — 81001 URINALYSIS AUTO W/SCOPE: CPT

## 2022-06-13 PROCEDURE — 87077 CULTURE AEROBIC IDENTIFY: CPT

## 2022-06-13 PROCEDURE — 87086 URINE CULTURE/COLONY COUNT: CPT

## 2022-06-13 PROCEDURE — 87186 SC STD MICRODIL/AGAR DIL: CPT

## 2022-06-13 PROCEDURE — 86403 PARTICLE AGGLUT ANTBDY SCRN: CPT

## 2022-06-14 LAB
-: ABNORMAL
BACTERIA: ABNORMAL
BILIRUBIN URINE: NEGATIVE
COLOR: YELLOW
EPITHELIAL CELLS UA: ABNORMAL /HPF (ref 0–5)
GLUCOSE URINE: NEGATIVE
KETONES, URINE: NEGATIVE
LEUKOCYTE ESTERASE, URINE: ABNORMAL
NITRITE, URINE: POSITIVE
PH UA: 6.5 (ref 5–8)
PROTEIN UA: ABNORMAL
RBC UA: ABNORMAL /HPF (ref 0–4)
SPECIFIC GRAVITY UA: 1.02 (ref 1–1.03)
TURBIDITY: ABNORMAL
URINE HGB: ABNORMAL
UROBILINOGEN, URINE: NORMAL
WBC UA: ABNORMAL /HPF (ref 0–5)

## 2022-06-15 ENCOUNTER — HOSPITAL ENCOUNTER (OUTPATIENT)
Age: 82
Setting detail: SPECIMEN
Discharge: HOME | End: 2022-06-15

## 2022-06-15 PROCEDURE — 87077 CULTURE AEROBIC IDENTIFY: CPT

## 2022-06-15 PROCEDURE — 87086 URINE CULTURE/COLONY COUNT: CPT

## 2022-06-15 PROCEDURE — 86403 PARTICLE AGGLUT ANTBDY SCRN: CPT

## 2022-06-15 PROCEDURE — 81001 URINALYSIS AUTO W/SCOPE: CPT

## 2022-06-15 PROCEDURE — 87186 SC STD MICRODIL/AGAR DIL: CPT

## 2022-06-16 LAB
-: ABNORMAL
AMORPHOUS: ABNORMAL
BACTERIA: ABNORMAL
BILIRUBIN URINE: NEGATIVE
COLOR: YELLOW
EPITHELIAL CELLS UA: ABNORMAL /HPF (ref 0–5)
GLUCOSE URINE: ABNORMAL
KETONES, URINE: NEGATIVE
LEUKOCYTE ESTERASE, URINE: ABNORMAL
NITRITE, URINE: POSITIVE
PH UA: 6 (ref 5–8)
PROTEIN UA: ABNORMAL
RBC UA: ABNORMAL /HPF (ref 0–2)
SPECIFIC GRAVITY UA: 1.02 (ref 1–1.03)
TURBIDITY: ABNORMAL
URINE HGB: ABNORMAL
UROBILINOGEN, URINE: NORMAL
WBC UA: ABNORMAL /HPF (ref 0–5)

## 2022-06-17 LAB
CULTURE: ABNORMAL
CULTURE: ABNORMAL
Lab: ABNORMAL
SPECIMEN DESCRIPTION: ABNORMAL

## 2022-06-19 LAB
CULTURE: ABNORMAL
CULTURE: ABNORMAL
Lab: ABNORMAL
SPECIMEN DESCRIPTION: ABNORMAL

## 2022-06-23 ENCOUNTER — HOSPITAL ENCOUNTER (OUTPATIENT)
Age: 82
Setting detail: SPECIMEN
Discharge: HOME | End: 2022-06-23

## 2022-06-23 LAB
ANION GAP SERPL CALCULATED.3IONS-SCNC: 10 MMOL/L (ref 9–17)
BUN BLDV-MCNC: 24 MG/DL (ref 8–23)
BUN/CREAT BLD: 29 (ref 9–20)
CALCIUM SERPL-MCNC: 8.7 MG/DL (ref 8.6–10.4)
CHLORIDE BLD-SCNC: 102 MMOL/L (ref 98–107)
CO2: 28 MMOL/L (ref 20–31)
CREAT SERPL-MCNC: 0.84 MG/DL (ref 0.7–1.2)
GFR AFRICAN AMERICAN: >60 ML/MIN
GFR NON-AFRICAN AMERICAN: >60 ML/MIN
GFR SERPL CREATININE-BSD FRML MDRD: ABNORMAL ML/MIN/{1.73_M2}
GLUCOSE BLD-MCNC: 125 MG/DL (ref 70–99)
HCT VFR BLD CALC: 36.4 % (ref 40.7–50.3)
HEMOGLOBIN: 11.3 G/DL (ref 13–17)
MCH RBC QN AUTO: 30.5 PG (ref 25.2–33.5)
MCHC RBC AUTO-ENTMCNC: 31 G/DL (ref 28.4–34.8)
MCV RBC AUTO: 98.4 FL (ref 82.6–102.9)
NRBC AUTOMATED: 0.2 PER 100 WBC
PDW BLD-RTO: 15 % (ref 11.8–14.4)
PLATELET # BLD: 209 K/UL (ref 138–453)
PMV BLD AUTO: 10.3 FL (ref 8.1–13.5)
POTASSIUM SERPL-SCNC: 3.4 MMOL/L (ref 3.7–5.3)
RBC # BLD: 3.7 M/UL (ref 4.21–5.77)
SODIUM BLD-SCNC: 140 MMOL/L (ref 135–144)
WBC # BLD: 8.1 K/UL (ref 3.5–11.3)

## 2022-06-23 PROCEDURE — 81001 URINALYSIS AUTO W/SCOPE: CPT

## 2022-06-23 PROCEDURE — 85027 COMPLETE CBC AUTOMATED: CPT

## 2022-06-23 PROCEDURE — 87086 URINE CULTURE/COLONY COUNT: CPT

## 2022-06-23 PROCEDURE — 80048 BASIC METABOLIC PNL TOTAL CA: CPT

## 2022-06-23 PROCEDURE — 36415 COLL VENOUS BLD VENIPUNCTURE: CPT

## 2022-06-23 PROCEDURE — P9603 ONE-WAY ALLOW PRORATED MILES: HCPCS

## 2022-06-24 LAB
-: ABNORMAL
BILIRUBIN URINE: NEGATIVE
COLOR: YELLOW
EPITHELIAL CELLS UA: ABNORMAL /HPF (ref 0–5)
GLUCOSE URINE: ABNORMAL
KETONES, URINE: NEGATIVE
LEUKOCYTE ESTERASE, URINE: ABNORMAL
NITRITE, URINE: NEGATIVE
PH UA: 6 (ref 5–8)
PROTEIN UA: ABNORMAL
RBC UA: ABNORMAL /HPF (ref 0–2)
SPECIFIC GRAVITY UA: 1.02 (ref 1–1.03)
TURBIDITY: ABNORMAL
URINE HGB: ABNORMAL
UROBILINOGEN, URINE: NORMAL
WBC UA: ABNORMAL /HPF (ref 0–5)
YEAST: ABNORMAL

## 2022-06-27 LAB
CULTURE: NORMAL
CULTURE: NORMAL
SPECIMEN DESCRIPTION: NORMAL

## 2022-09-12 ENCOUNTER — HOSPITAL ENCOUNTER (OUTPATIENT)
Age: 82
Setting detail: SPECIMEN
Discharge: HOME | End: 2022-09-12
Payer: MEDICARE

## 2022-09-12 LAB
BACTERIA: ABNORMAL
BILIRUBIN URINE: NEGATIVE
COLOR: YELLOW
EPITHELIAL CELLS UA: ABNORMAL /HPF (ref 0–5)
GLUCOSE URINE: NEGATIVE
KETONES, URINE: NEGATIVE
LEUKOCYTE ESTERASE, URINE: ABNORMAL
NITRITE, URINE: POSITIVE
PH UA: 6 (ref 5–8)
PROTEIN UA: NEGATIVE
RBC UA: ABNORMAL /HPF (ref 0–2)
SPECIFIC GRAVITY UA: 1.02 (ref 1–1.03)
TURBIDITY: ABNORMAL
URINE HGB: ABNORMAL
UROBILINOGEN, URINE: NORMAL
WBC UA: ABNORMAL /HPF (ref 0–5)

## 2022-09-12 PROCEDURE — 87077 CULTURE AEROBIC IDENTIFY: CPT

## 2022-09-12 PROCEDURE — 81001 URINALYSIS AUTO W/SCOPE: CPT

## 2022-09-12 PROCEDURE — 87086 URINE CULTURE/COLONY COUNT: CPT

## 2022-09-14 LAB
CULTURE: NORMAL
SPECIMEN DESCRIPTION: NORMAL

## 2022-09-19 ENCOUNTER — HOSPITAL ENCOUNTER (OUTPATIENT)
Age: 82
Setting detail: SPECIMEN
Discharge: HOME | End: 2022-09-19
Payer: MEDICARE

## 2022-09-19 LAB
ANION GAP SERPL CALCULATED.3IONS-SCNC: 10 MMOL/L (ref 9–17)
BUN BLDV-MCNC: 24 MG/DL (ref 8–23)
BUN/CREAT BLD: 24 (ref 9–20)
CALCIUM SERPL-MCNC: 9.5 MG/DL (ref 8.6–10.4)
CHLORIDE BLD-SCNC: 106 MMOL/L (ref 98–107)
CO2: 27 MMOL/L (ref 20–31)
CREAT SERPL-MCNC: 0.99 MG/DL (ref 0.7–1.2)
ESTIMATED AVERAGE GLUCOSE: 180 MG/DL
GFR AFRICAN AMERICAN: >60 ML/MIN
GFR NON-AFRICAN AMERICAN: >60 ML/MIN
GFR SERPL CREATININE-BSD FRML MDRD: ABNORMAL ML/MIN/{1.73_M2}
GLUCOSE BLD-MCNC: 126 MG/DL (ref 70–99)
HBA1C MFR BLD: 7.9 % (ref 4–6)
HCT VFR BLD CALC: 39.9 % (ref 40.7–50.3)
HEMOGLOBIN: 12.9 G/DL (ref 13–17)
MCH RBC QN AUTO: 30.6 PG (ref 25.2–33.5)
MCHC RBC AUTO-ENTMCNC: 32.3 G/DL (ref 28.4–34.8)
MCV RBC AUTO: 94.5 FL (ref 82.6–102.9)
NRBC AUTOMATED: 0 PER 100 WBC
PDW BLD-RTO: 13.7 % (ref 11.8–14.4)
PLATELET # BLD: 251 K/UL (ref 138–453)
PMV BLD AUTO: 10.1 FL (ref 8.1–13.5)
POTASSIUM SERPL-SCNC: 4.4 MMOL/L (ref 3.7–5.3)
RBC # BLD: 4.22 M/UL (ref 4.21–5.77)
SODIUM BLD-SCNC: 143 MMOL/L (ref 135–144)
WBC # BLD: 9.6 K/UL (ref 3.5–11.3)

## 2022-09-19 PROCEDURE — P9603 ONE-WAY ALLOW PRORATED MILES: HCPCS

## 2022-09-19 PROCEDURE — 85027 COMPLETE CBC AUTOMATED: CPT

## 2022-09-19 PROCEDURE — 36415 COLL VENOUS BLD VENIPUNCTURE: CPT

## 2022-09-19 PROCEDURE — 80048 BASIC METABOLIC PNL TOTAL CA: CPT

## 2022-09-19 PROCEDURE — 83036 HEMOGLOBIN GLYCOSYLATED A1C: CPT

## 2022-10-09 ENCOUNTER — HOSPITAL ENCOUNTER (OUTPATIENT)
Age: 82
Setting detail: SPECIMEN
Discharge: HOME | End: 2022-10-09

## 2022-10-10 ENCOUNTER — HOSPITAL ENCOUNTER (OUTPATIENT)
Age: 82
Setting detail: SPECIMEN
Discharge: HOME | End: 2022-10-10
Payer: MEDICARE

## 2022-10-10 LAB
ABSOLUTE EOS #: 0.27 K/UL (ref 0–0.44)
ABSOLUTE IMMATURE GRANULOCYTE: 0.04 K/UL (ref 0–0.3)
ABSOLUTE LYMPH #: 1.47 K/UL (ref 1.1–3.7)
ABSOLUTE MONO #: 0.98 K/UL (ref 0.1–1.2)
ALBUMIN SERPL-MCNC: 3.5 G/DL (ref 3.5–5.2)
ALP BLD-CCNC: 72 U/L (ref 40–129)
ALT SERPL-CCNC: 10 U/L (ref 5–41)
ANION GAP SERPL CALCULATED.3IONS-SCNC: 10 MMOL/L (ref 9–17)
AST SERPL-CCNC: 24 U/L
BASOPHILS # BLD: 1 % (ref 0–2)
BASOPHILS ABSOLUTE: 0.07 K/UL (ref 0–0.2)
BILIRUB SERPL-MCNC: 0.4 MG/DL (ref 0.3–1.2)
BILIRUBIN URINE: NEGATIVE
BUN BLDV-MCNC: 23 MG/DL (ref 8–23)
BUN/CREAT BLD: 25 (ref 9–20)
CALCIUM SERPL-MCNC: 9.5 MG/DL (ref 8.6–10.4)
CHLORIDE BLD-SCNC: 105 MMOL/L (ref 98–107)
CO2: 28 MMOL/L (ref 20–31)
COLOR: YELLOW
CREAT SERPL-MCNC: 0.91 MG/DL (ref 0.7–1.2)
CRYSTALS, UA: ABNORMAL /HPF
EOSINOPHILS RELATIVE PERCENT: 3 % (ref 1–4)
EPITHELIAL CELLS UA: ABNORMAL /HPF (ref 0–5)
GFR SERPL CREATININE-BSD FRML MDRD: >60 ML/MIN/1.73M2
GLUCOSE BLD-MCNC: 123 MG/DL (ref 70–99)
GLUCOSE URINE: NEGATIVE
HCT VFR BLD CALC: 40.7 % (ref 40.7–50.3)
HEMOGLOBIN: 12.8 G/DL (ref 13–17)
IMMATURE GRANULOCYTES: 1 %
KETONES, URINE: NEGATIVE
LEUKOCYTE ESTERASE, URINE: ABNORMAL
LYMPHOCYTES # BLD: 17 % (ref 24–43)
MCH RBC QN AUTO: 30.8 PG (ref 25.2–33.5)
MCHC RBC AUTO-ENTMCNC: 31.4 G/DL (ref 28.4–34.8)
MCV RBC AUTO: 97.8 FL (ref 82.6–102.9)
MONOCYTES # BLD: 11 % (ref 3–12)
MUCUS: ABNORMAL
NITRITE, URINE: POSITIVE
NRBC AUTOMATED: 0 PER 100 WBC
PDW BLD-RTO: 13.8 % (ref 11.8–14.4)
PH UA: 5.5 (ref 5–8)
PLATELET # BLD: 249 K/UL (ref 138–453)
PMV BLD AUTO: 10.5 FL (ref 8.1–13.5)
POTASSIUM SERPL-SCNC: 4.4 MMOL/L (ref 3.7–5.3)
PROSTATE SPECIFIC ANTIGEN: 2.15 NG/ML
PROTEIN UA: NEGATIVE
RBC # BLD: 4.16 M/UL (ref 4.21–5.77)
RBC UA: ABNORMAL /HPF (ref 0–2)
SEG NEUTROPHILS: 67 % (ref 36–65)
SEGMENTED NEUTROPHILS ABSOLUTE COUNT: 6.01 K/UL (ref 1.5–8.1)
SODIUM BLD-SCNC: 143 MMOL/L (ref 135–144)
SPECIFIC GRAVITY UA: 1.02 (ref 1–1.03)
TOTAL PROTEIN: 6.4 G/DL (ref 6.4–8.3)
TURBIDITY: ABNORMAL
URINE HGB: NEGATIVE
UROBILINOGEN, URINE: NORMAL
WBC # BLD: 8.8 K/UL (ref 3.5–11.3)
WBC UA: ABNORMAL /HPF (ref 0–5)

## 2022-10-10 PROCEDURE — 87186 SC STD MICRODIL/AGAR DIL: CPT

## 2022-10-10 PROCEDURE — 87077 CULTURE AEROBIC IDENTIFY: CPT

## 2022-10-10 PROCEDURE — P9603 ONE-WAY ALLOW PRORATED MILES: HCPCS

## 2022-10-10 PROCEDURE — 81001 URINALYSIS AUTO W/SCOPE: CPT

## 2022-10-10 PROCEDURE — 87086 URINE CULTURE/COLONY COUNT: CPT

## 2022-10-10 PROCEDURE — 80053 COMPREHEN METABOLIC PANEL: CPT

## 2022-10-10 PROCEDURE — G0103 PSA SCREENING: HCPCS

## 2022-10-10 PROCEDURE — 36415 COLL VENOUS BLD VENIPUNCTURE: CPT

## 2022-10-10 PROCEDURE — 85025 COMPLETE CBC W/AUTO DIFF WBC: CPT

## 2022-10-11 LAB
CULTURE: ABNORMAL
CULTURE: ABNORMAL
Lab: ABNORMAL
SPECIMEN DESCRIPTION: ABNORMAL

## 2022-10-24 ENCOUNTER — HOSPITAL ENCOUNTER (OUTPATIENT)
Age: 82
Setting detail: SPECIMEN
Discharge: HOME | End: 2022-10-24
Payer: MEDICARE

## 2022-10-24 PROCEDURE — 87077 CULTURE AEROBIC IDENTIFY: CPT

## 2022-10-24 PROCEDURE — 87086 URINE CULTURE/COLONY COUNT: CPT

## 2022-10-24 PROCEDURE — 87186 SC STD MICRODIL/AGAR DIL: CPT

## 2022-10-24 PROCEDURE — 81001 URINALYSIS AUTO W/SCOPE: CPT

## 2022-10-25 LAB
BACTERIA: ABNORMAL
BILIRUBIN URINE: NEGATIVE
COLOR: YELLOW
CRYSTALS, UA: ABNORMAL /HPF
CRYSTALS, UA: ABNORMAL /HPF
EPITHELIAL CELLS UA: ABNORMAL /HPF (ref 0–5)
GLUCOSE URINE: ABNORMAL
KETONES, URINE: NEGATIVE
LEUKOCYTE ESTERASE, URINE: ABNORMAL
NITRITE, URINE: NEGATIVE
PH UA: 6 (ref 5–8)
PROTEIN UA: ABNORMAL
RBC UA: ABNORMAL /HPF (ref 0–2)
SPECIFIC GRAVITY UA: 1.02 (ref 1–1.03)
TURBIDITY: CLEAR
URINE HGB: NEGATIVE
UROBILINOGEN, URINE: NORMAL
WBC UA: ABNORMAL /HPF (ref 0–5)
YEAST: ABNORMAL

## 2022-10-26 LAB
CULTURE: ABNORMAL
Lab: ABNORMAL
SPECIMEN DESCRIPTION: ABNORMAL

## 2022-11-18 ENCOUNTER — HOSPITAL ENCOUNTER (OUTPATIENT)
Age: 82
Setting detail: SPECIMEN
Discharge: HOME | End: 2022-11-18
Payer: MEDICARE

## 2022-11-18 LAB
ANION GAP SERPL CALCULATED.3IONS-SCNC: 10 MMOL/L (ref 9–17)
BACTERIA: ABNORMAL
BILIRUBIN URINE: NEGATIVE
BUN BLDV-MCNC: 27 MG/DL (ref 8–23)
BUN/CREAT BLD: 28 (ref 9–20)
CALCIUM SERPL-MCNC: 8.9 MG/DL (ref 8.6–10.4)
CHLORIDE BLD-SCNC: 107 MMOL/L (ref 98–107)
CO2: 26 MMOL/L (ref 20–31)
COLOR: YELLOW
CREAT SERPL-MCNC: 0.96 MG/DL (ref 0.7–1.2)
EPITHELIAL CELLS UA: ABNORMAL /HPF (ref 0–5)
GFR SERPL CREATININE-BSD FRML MDRD: >60 ML/MIN/1.73M2
GLUCOSE BLD-MCNC: 136 MG/DL (ref 70–99)
GLUCOSE URINE: ABNORMAL
HCT VFR BLD CALC: 38.7 % (ref 40.7–50.3)
HEMOGLOBIN: 12.2 G/DL (ref 13–17)
KETONES, URINE: NEGATIVE
LEUKOCYTE ESTERASE, URINE: ABNORMAL
MCH RBC QN AUTO: 30.6 PG (ref 25.2–33.5)
MCHC RBC AUTO-ENTMCNC: 31.5 G/DL (ref 28.4–34.8)
MCV RBC AUTO: 97 FL (ref 82.6–102.9)
NITRITE, URINE: POSITIVE
NRBC AUTOMATED: 0 PER 100 WBC
PDW BLD-RTO: 13.7 % (ref 11.8–14.4)
PH UA: 6 (ref 5–8)
PLATELET # BLD: 274 K/UL (ref 138–453)
PMV BLD AUTO: 10.5 FL (ref 8.1–13.5)
POTASSIUM SERPL-SCNC: 4.1 MMOL/L (ref 3.7–5.3)
PROTEIN UA: ABNORMAL
RBC # BLD: 3.99 M/UL (ref 4.21–5.77)
RBC UA: ABNORMAL /HPF (ref 0–2)
SODIUM BLD-SCNC: 143 MMOL/L (ref 135–144)
SPECIFIC GRAVITY UA: 1.02 (ref 1–1.03)
TURBIDITY: ABNORMAL
URINE HGB: ABNORMAL
UROBILINOGEN, URINE: NORMAL
WBC # BLD: 7.2 K/UL (ref 3.5–11.3)
WBC UA: ABNORMAL /HPF (ref 0–5)
YEAST: ABNORMAL

## 2022-11-18 PROCEDURE — 85027 COMPLETE CBC AUTOMATED: CPT

## 2022-11-18 PROCEDURE — 80048 BASIC METABOLIC PNL TOTAL CA: CPT

## 2022-11-18 PROCEDURE — 36415 COLL VENOUS BLD VENIPUNCTURE: CPT

## 2022-11-18 PROCEDURE — P9603 ONE-WAY ALLOW PRORATED MILES: HCPCS

## 2022-11-18 PROCEDURE — 87086 URINE CULTURE/COLONY COUNT: CPT

## 2022-11-18 PROCEDURE — 81001 URINALYSIS AUTO W/SCOPE: CPT

## 2022-11-18 PROCEDURE — 87088 URINE BACTERIA CULTURE: CPT

## 2022-11-18 RX ORDER — POTASSIUM CHLORIDE 1.5 G/1.77G
20 POWDER, FOR SOLUTION ORAL DAILY
COMMUNITY

## 2022-11-18 RX ORDER — INSULIN ASPART 100 [IU]/ML
INJECTION, SOLUTION INTRAVENOUS; SUBCUTANEOUS 3 TIMES DAILY
COMMUNITY

## 2022-11-18 RX ORDER — QUETIAPINE FUMARATE 25 MG/1
25 TABLET, FILM COATED ORAL DAILY PRN
COMMUNITY

## 2022-11-18 RX ORDER — QUETIAPINE FUMARATE 50 MG/1
50 TABLET, FILM COATED ORAL NIGHTLY
COMMUNITY

## 2022-11-18 RX ORDER — BUSPIRONE HYDROCHLORIDE 5 MG/1
5 TABLET ORAL 3 TIMES DAILY
COMMUNITY

## 2022-11-20 LAB
CULTURE: NORMAL
Lab: NORMAL
SPECIMEN DESCRIPTION: NORMAL

## 2022-11-21 ENCOUNTER — ANESTHESIA (OUTPATIENT)
Dept: OPERATING ROOM | Age: 82
End: 2022-11-21
Payer: MEDICARE

## 2022-11-21 ENCOUNTER — ANESTHESIA EVENT (OUTPATIENT)
Dept: OPERATING ROOM | Age: 82
End: 2022-11-21
Payer: MEDICARE

## 2022-11-21 ENCOUNTER — HOSPITAL ENCOUNTER (OUTPATIENT)
Age: 82
Setting detail: OUTPATIENT SURGERY
Discharge: NURSING, REHAB OR LONG-TERM CARE | End: 2022-11-21
Attending: UROLOGY | Admitting: UROLOGY
Payer: MEDICARE

## 2022-11-21 VITALS
BODY MASS INDEX: 18.76 KG/M2 | HEIGHT: 71 IN | OXYGEN SATURATION: 98 % | HEART RATE: 55 BPM | DIASTOLIC BLOOD PRESSURE: 63 MMHG | TEMPERATURE: 97.2 F | RESPIRATION RATE: 18 BRPM | WEIGHT: 134 LBS | SYSTOLIC BLOOD PRESSURE: 100 MMHG

## 2022-11-21 DIAGNOSIS — E11.9 TYPE 2 DIABETES MELLITUS WITHOUT COMPLICATION, WITHOUT LONG-TERM CURRENT USE OF INSULIN (HCC): ICD-10-CM

## 2022-11-21 LAB
ANION GAP: 12 MMOL/L (ref 7–16)
EGFR, POC: >60 ML/MIN/1.73M2
GLUCOSE BLD-MCNC: 153 MG/DL (ref 74–100)
GLUCOSE BLD-MCNC: 153 MG/DL (ref 75–110)
POC BUN: 24 MG/DL (ref 8–26)
POC CHLORIDE: 105 MMOL/L (ref 98–107)
POC CREATININE: 0.91 MG/DL (ref 0.51–1.19)
POC HEMATOCRIT: 44 % (ref 41–53)
POC HEMOGLOBIN: 14.9 G/DL (ref 13.5–17.5)
POC IONIZED CALCIUM: 1.26 MMOL/L (ref 1.15–1.33)
POC LACTIC ACID: 1.58 MMOL/L (ref 0.56–1.39)
POC POTASSIUM: 4.1 MMOL/L (ref 3.5–4.5)
POC SODIUM: 141 MMOL/L (ref 138–146)
POC TCO2: 25 MMOL/L (ref 22–30)

## 2022-11-21 PROCEDURE — 2500000003 HC RX 250 WO HCPCS: Performed by: NURSE ANESTHETIST, CERTIFIED REGISTERED

## 2022-11-21 PROCEDURE — 2720000010 HC SURG SUPPLY STERILE: Performed by: UROLOGY

## 2022-11-21 PROCEDURE — A4216 STERILE WATER/SALINE, 10 ML: HCPCS | Performed by: NURSE ANESTHETIST, CERTIFIED REGISTERED

## 2022-11-21 PROCEDURE — 84520 ASSAY OF UREA NITROGEN: CPT

## 2022-11-21 PROCEDURE — 82947 ASSAY GLUCOSE BLOOD QUANT: CPT

## 2022-11-21 PROCEDURE — 3700000001 HC ADD 15 MINUTES (ANESTHESIA): Performed by: UROLOGY

## 2022-11-21 PROCEDURE — 7100000040 HC SPAR PHASE II RECOVERY - FIRST 15 MIN: Performed by: UROLOGY

## 2022-11-21 PROCEDURE — 6370000000 HC RX 637 (ALT 250 FOR IP): Performed by: ANESTHESIOLOGY

## 2022-11-21 PROCEDURE — 83605 ASSAY OF LACTIC ACID: CPT

## 2022-11-21 PROCEDURE — 7100000041 HC SPAR PHASE II RECOVERY - ADDTL 15 MIN: Performed by: UROLOGY

## 2022-11-21 PROCEDURE — 3600000004 HC SURGERY LEVEL 4 BASE: Performed by: UROLOGY

## 2022-11-21 PROCEDURE — 80051 ELECTROLYTE PANEL: CPT

## 2022-11-21 PROCEDURE — 6360000002 HC RX W HCPCS: Performed by: STUDENT IN AN ORGANIZED HEALTH CARE EDUCATION/TRAINING PROGRAM

## 2022-11-21 PROCEDURE — 7100000001 HC PACU RECOVERY - ADDTL 15 MIN: Performed by: UROLOGY

## 2022-11-21 PROCEDURE — 82565 ASSAY OF CREATININE: CPT

## 2022-11-21 PROCEDURE — 2709999900 HC NON-CHARGEABLE SUPPLY: Performed by: UROLOGY

## 2022-11-21 PROCEDURE — 2580000003 HC RX 258: Performed by: NURSE ANESTHETIST, CERTIFIED REGISTERED

## 2022-11-21 PROCEDURE — 85014 HEMATOCRIT: CPT

## 2022-11-21 PROCEDURE — 3700000000 HC ANESTHESIA ATTENDED CARE: Performed by: UROLOGY

## 2022-11-21 PROCEDURE — 2580000003 HC RX 258: Performed by: UROLOGY

## 2022-11-21 PROCEDURE — 3600000014 HC SURGERY LEVEL 4 ADDTL 15MIN: Performed by: UROLOGY

## 2022-11-21 PROCEDURE — 82330 ASSAY OF CALCIUM: CPT

## 2022-11-21 PROCEDURE — 7100000000 HC PACU RECOVERY - FIRST 15 MIN: Performed by: UROLOGY

## 2022-11-21 PROCEDURE — 2580000003 HC RX 258: Performed by: STUDENT IN AN ORGANIZED HEALTH CARE EDUCATION/TRAINING PROGRAM

## 2022-11-21 PROCEDURE — 6360000002 HC RX W HCPCS: Performed by: NURSE ANESTHETIST, CERTIFIED REGISTERED

## 2022-11-21 RX ORDER — HYOSCYAMINE SULFATE 0.12 MG/1
0.12 TABLET SUBLINGUAL EVERY 6 HOURS PRN
Qty: 30 EACH | Refills: 0 | Status: SHIPPED | OUTPATIENT
Start: 2022-11-21 | End: 2022-11-21 | Stop reason: SDUPTHER

## 2022-11-21 RX ORDER — NITROFURANTOIN 25; 75 MG/1; MG/1
100 CAPSULE ORAL 2 TIMES DAILY
Qty: 6 CAPSULE | Refills: 0 | Status: SHIPPED | OUTPATIENT
Start: 2022-11-21 | End: 2022-11-24

## 2022-11-21 RX ORDER — CEFUROXIME AXETIL 500 MG/1
500 TABLET ORAL 2 TIMES DAILY
Qty: 6 TABLET | Refills: 0 | Status: SHIPPED | OUTPATIENT
Start: 2022-11-21 | End: 2022-11-21 | Stop reason: SDUPTHER

## 2022-11-21 RX ORDER — IPRATROPIUM BROMIDE AND ALBUTEROL SULFATE 2.5; .5 MG/3ML; MG/3ML
1 SOLUTION RESPIRATORY (INHALATION) ONCE
Status: COMPLETED | OUTPATIENT
Start: 2022-11-21 | End: 2022-11-21

## 2022-11-21 RX ORDER — HYDRALAZINE HYDROCHLORIDE 20 MG/ML
10 INJECTION INTRAMUSCULAR; INTRAVENOUS
Status: DISCONTINUED | OUTPATIENT
Start: 2022-11-21 | End: 2022-11-21 | Stop reason: HOSPADM

## 2022-11-21 RX ORDER — FENTANYL CITRATE 50 UG/ML
INJECTION, SOLUTION INTRAMUSCULAR; INTRAVENOUS PRN
Status: DISCONTINUED | OUTPATIENT
Start: 2022-11-21 | End: 2022-11-21 | Stop reason: SDUPTHER

## 2022-11-21 RX ORDER — SODIUM CHLORIDE 0.9 % (FLUSH) 0.9 %
5-40 SYRINGE (ML) INJECTION EVERY 12 HOURS SCHEDULED
Status: DISCONTINUED | OUTPATIENT
Start: 2022-11-21 | End: 2022-11-21 | Stop reason: HOSPADM

## 2022-11-21 RX ORDER — PROPOFOL 10 MG/ML
INJECTION, EMULSION INTRAVENOUS PRN
Status: DISCONTINUED | OUTPATIENT
Start: 2022-11-21 | End: 2022-11-21 | Stop reason: SDUPTHER

## 2022-11-21 RX ORDER — ONDANSETRON 2 MG/ML
INJECTION INTRAMUSCULAR; INTRAVENOUS PRN
Status: DISCONTINUED | OUTPATIENT
Start: 2022-11-21 | End: 2022-11-21 | Stop reason: SDUPTHER

## 2022-11-21 RX ORDER — SODIUM CHLORIDE 9 MG/ML
INJECTION INTRAVENOUS PRN
Status: DISCONTINUED | OUTPATIENT
Start: 2022-11-21 | End: 2022-11-21 | Stop reason: SDUPTHER

## 2022-11-21 RX ORDER — DIPHENHYDRAMINE HYDROCHLORIDE 50 MG/ML
12.5 INJECTION INTRAMUSCULAR; INTRAVENOUS
Status: DISCONTINUED | OUTPATIENT
Start: 2022-11-21 | End: 2022-11-21 | Stop reason: HOSPADM

## 2022-11-21 RX ORDER — HYOSCYAMINE SULFATE 0.12 MG/1
0.12 TABLET SUBLINGUAL EVERY 6 HOURS PRN
Qty: 30 EACH | Refills: 0 | Status: SHIPPED | OUTPATIENT
Start: 2022-11-21

## 2022-11-21 RX ORDER — ROCURONIUM BROMIDE 10 MG/ML
INJECTION, SOLUTION INTRAVENOUS PRN
Status: DISCONTINUED | OUTPATIENT
Start: 2022-11-21 | End: 2022-11-21 | Stop reason: SDUPTHER

## 2022-11-21 RX ORDER — DROPERIDOL 2.5 MG/ML
0.62 INJECTION, SOLUTION INTRAMUSCULAR; INTRAVENOUS
Status: DISCONTINUED | OUTPATIENT
Start: 2022-11-21 | End: 2022-11-21 | Stop reason: HOSPADM

## 2022-11-21 RX ORDER — NEOSTIGMINE METHYLSULFATE 5 MG/5 ML
SYRINGE (ML) INTRAVENOUS PRN
Status: DISCONTINUED | OUTPATIENT
Start: 2022-11-21 | End: 2022-11-21 | Stop reason: SDUPTHER

## 2022-11-21 RX ORDER — MAGNESIUM HYDROXIDE 1200 MG/15ML
LIQUID ORAL CONTINUOUS PRN
Status: DISCONTINUED | OUTPATIENT
Start: 2022-11-21 | End: 2022-11-21 | Stop reason: HOSPADM

## 2022-11-21 RX ORDER — NITROFURANTOIN 25; 75 MG/1; MG/1
100 CAPSULE ORAL 2 TIMES DAILY
Qty: 6 CAPSULE | Refills: 0 | Status: SHIPPED | OUTPATIENT
Start: 2022-11-21 | End: 2022-11-21 | Stop reason: SDUPTHER

## 2022-11-21 RX ORDER — CEFUROXIME AXETIL 500 MG/1
500 TABLET ORAL 2 TIMES DAILY
Qty: 6 TABLET | Refills: 0 | Status: SHIPPED | OUTPATIENT
Start: 2022-11-21 | End: 2022-11-24

## 2022-11-21 RX ORDER — SODIUM CHLORIDE, SODIUM LACTATE, POTASSIUM CHLORIDE, CALCIUM CHLORIDE 600; 310; 30; 20 MG/100ML; MG/100ML; MG/100ML; MG/100ML
INJECTION, SOLUTION INTRAVENOUS CONTINUOUS PRN
Status: DISCONTINUED | OUTPATIENT
Start: 2022-11-21 | End: 2022-11-21 | Stop reason: SDUPTHER

## 2022-11-21 RX ORDER — SODIUM CHLORIDE 0.9 % (FLUSH) 0.9 %
5-40 SYRINGE (ML) INJECTION PRN
Status: DISCONTINUED | OUTPATIENT
Start: 2022-11-21 | End: 2022-11-21 | Stop reason: HOSPADM

## 2022-11-21 RX ORDER — GLYCOPYRROLATE 0.2 MG/ML
INJECTION INTRAMUSCULAR; INTRAVENOUS PRN
Status: DISCONTINUED | OUTPATIENT
Start: 2022-11-21 | End: 2022-11-21 | Stop reason: SDUPTHER

## 2022-11-21 RX ORDER — SODIUM CHLORIDE 9 MG/ML
25 INJECTION, SOLUTION INTRAVENOUS PRN
Status: DISCONTINUED | OUTPATIENT
Start: 2022-11-21 | End: 2022-11-21 | Stop reason: HOSPADM

## 2022-11-21 RX ORDER — DEXAMETHASONE SODIUM PHOSPHATE 10 MG/ML
INJECTION INTRAMUSCULAR; INTRAVENOUS PRN
Status: DISCONTINUED | OUTPATIENT
Start: 2022-11-21 | End: 2022-11-21 | Stop reason: SDUPTHER

## 2022-11-21 RX ORDER — METOCLOPRAMIDE HYDROCHLORIDE 5 MG/ML
10 INJECTION INTRAMUSCULAR; INTRAVENOUS
Status: DISCONTINUED | OUTPATIENT
Start: 2022-11-21 | End: 2022-11-21 | Stop reason: HOSPADM

## 2022-11-21 RX ORDER — LIDOCAINE HYDROCHLORIDE 10 MG/ML
INJECTION, SOLUTION EPIDURAL; INFILTRATION; INTRACAUDAL; PERINEURAL PRN
Status: DISCONTINUED | OUTPATIENT
Start: 2022-11-21 | End: 2022-11-21 | Stop reason: SDUPTHER

## 2022-11-21 RX ADMIN — FENTANYL CITRATE 25 MCG: 50 INJECTION, SOLUTION INTRAMUSCULAR; INTRAVENOUS at 09:10

## 2022-11-21 RX ADMIN — PROPOFOL 150 MG: 10 INJECTION, EMULSION INTRAVENOUS at 09:10

## 2022-11-21 RX ADMIN — Medication 3 MG: at 10:24

## 2022-11-21 RX ADMIN — SODIUM CHLORIDE, POTASSIUM CHLORIDE, SODIUM LACTATE AND CALCIUM CHLORIDE: 600; 310; 30; 20 INJECTION, SOLUTION INTRAVENOUS at 08:55

## 2022-11-21 RX ADMIN — PHENYLEPHRINE HYDROCHLORIDE 100 MCG: 10 INJECTION INTRAVENOUS at 09:17

## 2022-11-21 RX ADMIN — DEXAMETHASONE SODIUM PHOSPHATE 10 MG: 10 INJECTION INTRAMUSCULAR; INTRAVENOUS at 09:17

## 2022-11-21 RX ADMIN — FLUCONAZOLE 200 MG: 2 INJECTION, SOLUTION INTRAVENOUS at 09:36

## 2022-11-21 RX ADMIN — LIDOCAINE HYDROCHLORIDE 50 MG: 10 INJECTION, SOLUTION EPIDURAL; INFILTRATION; INTRACAUDAL; PERINEURAL at 09:10

## 2022-11-21 RX ADMIN — Medication 1000 MG: at 09:59

## 2022-11-21 RX ADMIN — IPRATROPIUM BROMIDE AND ALBUTEROL SULFATE 1 AMPULE: .5; 2.5 SOLUTION RESPIRATORY (INHALATION) at 07:49

## 2022-11-21 RX ADMIN — SODIUM CHLORIDE 10 ML: 9 INJECTION, SOLUTION INTRAMUSCULAR; INTRAVENOUS; SUBCUTANEOUS at 09:17

## 2022-11-21 RX ADMIN — ONDANSETRON 4 MG: 2 INJECTION INTRAMUSCULAR; INTRAVENOUS at 10:24

## 2022-11-21 RX ADMIN — ROCURONIUM BROMIDE 50 MG: 10 INJECTION, SOLUTION INTRAVENOUS at 09:10

## 2022-11-21 RX ADMIN — PIPERACILLIN AND TAZOBACTAM 3375 MG: 3; .375 INJECTION, POWDER, LYOPHILIZED, FOR SOLUTION INTRAVENOUS at 09:19

## 2022-11-21 RX ADMIN — FENTANYL CITRATE 25 MCG: 50 INJECTION, SOLUTION INTRAMUSCULAR; INTRAVENOUS at 09:53

## 2022-11-21 RX ADMIN — GLYCOPYRROLATE 0.6 MG: 0.2 INJECTION INTRAMUSCULAR; INTRAVENOUS at 10:24

## 2022-11-21 ASSESSMENT — PAIN SCALES - GENERAL
PAINLEVEL_OUTOF10: 0

## 2022-11-21 ASSESSMENT — LIFESTYLE VARIABLES: SMOKING_STATUS: 1

## 2022-11-21 NOTE — ANESTHESIA PRE PROCEDURE
Department of Anesthesiology  Preprocedure Note       Name:  Adriana Cazares   Age:  [de-identified] y.o.  :  1940                                          MRN:  [id number]         Date:  2022      Surgeon: Marisa Willis):  Evangelista Garcia MD    Procedure: Procedure(s):  Margert Rashida CONF# [id number] -ANA)    Medications prior to admission:   Prior to Admission medications    Medication Sig Start Date End Date Taking?  Authorizing Provider   busPIRone (BUSPAR) 5 MG tablet Take 5 mg by mouth 3 times daily 1/2 tab tid prn   Yes Historical Provider, MD   Melatonin 1 MG CAPS Take 1 capsule by mouth at bedtime   Yes Historical Provider, MD MILLS NICOTINE 15 MG/16 HR PATCH AND REMOVAL, INPATIENT, 14 mg/24 hr   Yes Historical Provider, MD   insulin aspart (NOVOLOG) 100 UNIT/ML injection vial Inject into the skin 3 times daily Sliding scale   Yes Historical Provider, MD   potassium chloride (KLOR-CON) 20 MEQ packet Take 20 mEq by mouth daily   Yes Historical Provider, MD   LACTOBACILLUS PO Take 1 capsule by mouth daily   Yes Historical Provider, MD   QUEtiapine (SEROQUEL) 50 MG tablet Take 50 mg by mouth at bedtime   Yes Historical Provider, MD   QUEtiapine (SEROQUEL) 25 MG tablet Take 25 mg by mouth daily as needed 1/2 tab in afternoon for anxiety   Yes Historical Provider, MD   sertraline (ZOLOFT) 50 MG tablet Take 50 mg by mouth daily   Yes Historical Provider, MD   metoprolol tartrate (LOPRESSOR) 25 MG tablet Take 0.5 tablets by mouth 2 times daily 22   Eric Isaacs MD   ipratropium-albuterol (DUONEB) 0.5-2.5 (3) MG/3ML SOLN nebulizer solution Inhale 3 mLs into the lungs every 4 hours as needed for Shortness of Breath  Patient not taking: Reported on 2022   Elvira Kaminski MD   budesonide-formoterol (SYMBICORT) 160-4.5 MCG/ACT AERO Inhale 2 puffs into the lungs 2 times daily  Patient not taking: Reported on 2022   Elvira Kaminski MD   finasteride (PROSCAR) 5 MG tablet Take 5 mg by mouth daily    Historical Provider, MD   tamsulosin (FLOMAX) 0.4 MG capsule Take 0.4 mg by mouth at bedtime    Historical Provider, MD   calcium citrate (CALCITRATE) 950 (200 Ca) MG tablet Take 400 mg by mouth 2 times daily  12/23/21   Historical Provider, MD   acetaminophen (TYLENOL) 500 MG tablet Take 1,000 mg by mouth every 6 hours as needed 11/30/21   Historical Provider, MD   metFORMIN (GLUCOPHAGE) 850 MG tablet Take 1 tablet by mouth 2 times daily (with meals)  Patient taking differently: Take 1,000 mg by mouth 2 times daily (with meals) 12/27/21   Eduardo Christensen DO   latanoprost (XALATAN) 0.005 % ophthalmic solution Administer 1 drop to the right eye nightly.  10/27/16   Historical Provider, MD       Current medications:    Current Facility-Administered Medications   Medication Dose Route Frequency Provider Last Rate Last Admin    piperacillin-tazobactam (ZOSYN) 3,375 mg in dextrose 5 % 50 mL IVPB (mini-bag)  3,375 mg IntraVENous Once Jon Jesus MD           Allergies:  No Known Allergies    Problem List:    Patient Active Problem List   Diagnosis Code    Type 2 diabetes mellitus, without long-term current use of insulin ([facility]) E11.9    Essential hypertension I10    Advanced open-angle glaucoma H40.10X0    Benign non-nodular prostatic hyperplasia with lower urinary tract symptoms N40.1    Dry eyes, bilateral H04.123    Elevated PSA R97.20    Blindness of both eyes H54.3    Closed right hip fracture ([facility]) S72.001A    Moderate protein-calorie malnutrition (HCC) E44.0    Toxic metabolic encephalopathy S86.6    Tobacco abuse Z72.0    Acute respiratory failure with hypoxia ([facility]) J96.01    Pulmonary emphysema (HCC) J43.9    COPD exacerbation ([facility]) J44.1    Secondary spontaneous pneumothorax J93.12       Past Medical History:        Diagnosis Date    COPD (chronic obstructive pulmonary disease) ([facility] [address]     COVID-19 11/2021    Diabetes mellitus (HCC)     Elevated PSA     Glaucoma     05:45 AM    PROT 6.4 10/10/2022 05:35 AM    CALCIUM 8.9 11/18/2022 05:45 AM    BILITOT 0.4 10/10/2022 05:35 AM    ALKPHOS 72 10/10/2022 05:35 AM    AST 24 10/10/2022 05:35 AM    ALT 10 10/10/2022 05:35 AM       POC Tests: No results for input(s): POCGLU, POCNA, POCK, POCCL, POCBUN, POCHEMO, POCHCT in the last 72 hours. Coags:   Lab Results   Component Value Date/Time    PROTIME 13.4 04/08/2022 03:10 AM    INR 1.0 04/08/2022 03:10 AM    APTT 35.0 04/08/2022 03:10 AM       HCG (If Applicable): No results found for: PREGTESTUR, PREGSERUM, HCG, HCGQUANT     ABGs: No results found for: PHART, PO2ART, APC8RUF, NKV3WWZ, BEART, H3WLOUQA     Type & Screen (If Applicable):  No results found for: LABABO, LABRH    Drug/Infectious Status (If Applicable):  No results found for: HIV, HEPCAB    COVID-19 Screening (If Applicable):   Lab Results   Component Value Date/Time    COVID19 Not Detected 04/10/2022 11:50 AM           Anesthesia Evaluation  Patient summary reviewed and Nursing notes reviewed  Airway: Mallampati: IV  TM distance: >3 FB   Neck ROM: limited  Mouth opening: > = 3 FB   Dental:    (+) upper dentures and lower dentures      Pulmonary:   (+) COPD:  decreased breath sounds current smoker (60 pack year smoker)                           Cardiovascular:    (+) hypertension:,                   Neuro/Psych:   (+) depression/anxiety              ROS comment: Patient is blind GI/Hepatic/Renal:             Endo/Other:    (+) DiabetesType II DM, using insulin, . ROS comment: Marijuana use Abdominal:             Vascular: Other Findings:           Anesthesia Plan      general     ASA 3       Induction: intravenous. MIPS: Postoperative opioids intended and Prophylactic antiemetics administered. Anesthetic plan and risks discussed with patient. Plan discussed with CRNA.                     Kimi Huerta MD   11/21/2022

## 2022-11-21 NOTE — DISCHARGE INSTRUCTIONS
Greenlight:   The patient should have CBI weaned off in recovery. Please call urology if urine is not clear / pink with CBI. Traction should be released before discharge. Patient to have modi catheter removed in facility tomorrow. Cont flomax for 1 month    You may see blood in the urine after the procedure. This should resolve over the next couple days. Please stay hydrated. You may experience frequency/urgency of urination after the procedure. We expect these symptoms to improve over the next couple weeks. Tylenol for pain control  Pt ok to discharge home in good condition  No heavy lifting, >10 lbs for today  Pt should avoid strenuous activity for today  Pt should walk moderately at home  Pt ok to shower   Pt may resume diet as tolerated  Pt should take Rx as directed  No driving while on narcotics  Please call attending physician or hospital  with questions  Call or Present to ED if fever (> 101F), intractable nausea vomiting or pain. Pt should follow up with Dr. Fuentes Kern in 4 weeks    Home with modi catheter. Please teach modi education and send home with leg and night bag. You may see intermittent blood in the urine while the catheter in place. If the catheter becomes obstructed and needs to be exchanged, please call. No alcoholic beverages, no driving or operating machinery, no making important decisions for 24 hours. You may have a normal diet but should eat lightly day of surgery. Drink plenty of fluids.   Urinate within 8 hours after surgery, if unable to urinate call your doctor

## 2022-11-21 NOTE — OP NOTE
Operative Note      Patient: Oj Doshi  YOB: 1940  MRN: [id number]    Date of Procedure: 11/21/2022    Pre-Op Diagnosis: BPH    Post-Op Diagnosis: Same       Procedure(s):  Cystoscopy, Greenlight photovaporization of the prostate    Surgeon(s):  Gudelia Lopez MD    Assistant:   Resident: Sadi Motta MD    Anesthesia: General    Estimated Blood Loss (mL): Minimal    Complications: None    Specimens:   * No specimens in log *    Implants:  * No implants in log *      Drains:   Urinary Catheter 11/21/22 3 Way (Active)       [REMOVED] Urinary Catheter 11/21/22 Worley (Removed)   Urine Color Yellow 11/21/22 0757   Urine Appearance Sediment 11/21/22 0745   Collection Container Standard 11/21/22 0745       Findings:   Cystoscopy: Kissing bilateral prostatic lobes      INDICATIONS:  Patient is a 74-year-old male with BPH with urinary retention, on Flomax and Proscar, with chronic Worley catheter. He presents today for Rufina Linda of the prostate. After risks, benefits and alternatives of the procedure were discussed with the patient, informed consent was obtained and the patient elected to proceed. OPERATIVE SUMMARY:  The risks and benefits of the procedure were explained to the patient in the preoperative area. After informed consent was obtained, the patient was taken back to the operating room. The patient was transferred to the operating table and placed in a supine position. General anesthesia was induced and the patient was placed in the dorsal lithotomy position. He was prepped and draped in a sterile fashion and a time-out was performed to confirm patient identity and procedure. Prior to induction of anesthesia the patient was administered preoperative antibiotics and EPC cuffs were on and functioning. Our continuous flow sheath with obturator and lens was inserted through the patient's urethra and into the bladder.  Upon entering the bladder we located both ureteral orifices. On evaluation of the prostate the patient was noted to have kissing bilateral prostatic lobes. The GreenLight fiber was then inserted after we removed our obturator and placed our working bridge through out continous flow sheath. GreenLight photovaporization was initiated beginning with lateral lobes. Beginning at the bladder neck and working distally GreenLight photovaporization was performed. Then we vaporized the median lobe. Finally we turned our attention to the apical tissue. While using the verumontanum for a marker for the external urinary sphincter, apical prostate tissue was resected using the GreenLight laser. Extensive photovaporization of the prostate was performed. After incising the bladder neck with our laser, the prostatic urethra appeared to be wide open and no longer obstructed. No significant bleeding was noted at the conclusion of the procedure. Both ureteral orifices were noted to be uninvolved in the resection. There was no scatter of laser fiber into the bladder. We did not go distal to the verumontanum. We worked at 120 jin power throughout the case. The scope was removed and a 22-[place] 3-way Worley catheter was inserted and irrigated to confirm position. Continuous bladder irrigation with normal saline was then initiated and 3 L of normal saline were allowed to infuse before the catheter was clamped. The patient was awoken and transferred to PACU. All instruments and equipment were accounted for at the end of the procedure. Dr. Marie Nascimento was present and scrubbed for the entire procedure. DISPOSITION:  The patient was transferred to PACU in good condition. He will be discharged home from the hospital per the PACU team. Appropriate follow up will be arranged.        Electronically signed by Leeroy Fine MD on 11/21/2022 at 10:38 AM

## 2022-11-21 NOTE — PROGRESS NOTES
RN called report to nurse, Farhad Boothe, at [facility]. No further questions. Paper scripts being sent with pt and D/C instruction packet.

## 2022-11-21 NOTE — ANESTHESIA POSTPROCEDURE EVALUATION
POST- ANESTHESIA EVALUATION       Pt Name: Yennifer Vieira  MRN: [id number]  YOB: 1940  Date of evaluation: 11/21/2022  Time:  12:20 PM      /61   Pulse 51   Temp 96.8 °F (36 °C) (Temporal)   Resp 18   Ht 5' 11\" (1.803 m)   Wt 134 lb (60.8 kg)   SpO2 97%   BMI 18.69 kg/m²      Consciousness Level  Awake  Cardiopulmonary Status  Stable  Pain Adequately Treated YES  Nausea / Vomiting  NO  Adequate Hydration  YES  Anesthesia Related Complications NONE      Electronically signed by Jordyn Purvis MD on 11/21/2022 at 12:20 PM       Department of Anesthesiology  Postprocedure Note    Patient: Yennifer Vieira  MRN: [id number]  YOB: 1940  Date of evaluation: 11/21/2022      Procedure Summary     Date: 11/21/22 Room / Location: [facility] 01 / [facility]    Anesthesia Start: 9964 Anesthesia Stop: 1050    Procedure: [facility] CONF# [id number] -ANA) Diagnosis:       Benign prostatic hyperplasia, unspecified whether lower urinary tract symptoms present      (BPH)    Surgeons: Yahaira Taylor MD Responsible Provider: Jordyn Purvis MD    Anesthesia Type: general ASA Status: 3          Anesthesia Type: No value filed.     Stephanie Phase I: Stephanie Score: 10    Stephanie Phase II: Stephanie Score: 10      Anesthesia Post Evaluation

## 2022-11-21 NOTE — H&P
History and Physical    Patient:  Zenia Kraus  MRN: [id number]  YOB: 1940    CHIEF COMPLAINT:  BPH with LUTS    HISTORY OF PRESENT ILLNESS:   The patient is a [de-identified] y.o. male who presents with BPH with LUTS    Past Medical History:    Past Medical History:   Diagnosis Date    COPD (chronic obstructive pulmonary disease) ([facility]     COVID-19 11/2021    Diabetes mellitus ([facility] [address] [facility]     Elevated PSA     Glaucoma     Hip fracture ([facility]     Hypertension     Major depressive disorder     Nursing home resident 11/17/2022    [phone]    PAF (paroxysmal atrial fibrillation) [facility])        Past Surgical History:    Past Surgical History:   Procedure Laterality Date    CYSTOSCOPY  02/09/2022    EYE SURGERY      FRACTURE SURGERY Right 11/2021    broken hip       Medications Prior to Admission:    Prior to Admission medications    Medication Sig Start Date End Date Taking?  Authorizing Provider   busPIRone (BUSPAR) 5 MG tablet Take 5 mg by mouth 3 times daily 1/2 tab tid prn   Yes Historical Provider, MD   Melatonin 1 MG CAPS Take 1 capsule by mouth at bedtime   Yes Historical Provider, MD MILLS NICOTINE 15 MG/16 HR PATCH AND REMOVAL, INPATIENT, 14 mg/24 hr   Yes Historical Provider, MD   insulin aspart (NOVOLOG) 100 UNIT/ML injection vial Inject into the skin 3 times daily Sliding scale   Yes Historical Provider, MD   potassium chloride (KLOR-CON) 20 MEQ packet Take 20 mEq by mouth daily   Yes Historical Provider, MD   LACTOBACILLUS PO Take 1 capsule by mouth daily   Yes Historical Provider, MD   QUEtiapine (SEROQUEL) 50 MG tablet Take 50 mg by mouth at bedtime   Yes Historical Provider, MD   QUEtiapine (SEROQUEL) 25 MG tablet Take 25 mg by mouth daily as needed 1/2 tab in afternoon for anxiety   Yes Historical Provider, MD   sertraline (ZOLOFT) 50 MG tablet Take 50 mg by mouth daily   Yes Historical Provider, MD   metoprolol tartrate (LOPRESSOR) 25 MG tablet Take 0.5 tablets by mouth 2 times daily 4/16/22   Curly Pandey MD   ipratropium-albuterol (DUONEB) 0.5-2.5 (3) MG/3ML SOLN nebulizer solution Inhale 3 mLs into the lungs every 4 hours as needed for Shortness of Breath  Patient not taking: Reported on 11/18/2022 4/14/22   Lorna Taveras MD   budesonide-formoterol (SYMBICORT) 160-4.5 MCG/ACT AERO Inhale 2 puffs into the lungs 2 times daily  Patient not taking: Reported on 11/18/2022 4/14/22   Lorna Taveras MD   finasteride (PROSCAR) 5 MG tablet Take 5 mg by mouth daily    Historical Provider, MD   tamsulosin (FLOMAX) 0.4 MG capsule Take 0.4 mg by mouth at bedtime    Historical Provider, MD   calcium citrate (CALCITRATE) 950 (200 Ca) MG tablet Take 400 mg by mouth 2 times daily  12/23/21   Historical Provider, MD   acetaminophen (TYLENOL) 500 MG tablet Take 1,000 mg by mouth every 6 hours as needed 11/30/21   Historical Provider, MD   metFORMIN (GLUCOPHAGE) 850 MG tablet Take 1 tablet by mouth 2 times daily (with meals)  Patient taking differently: Take 1,000 mg by mouth 2 times daily (with meals) 12/27/21   Nanci Wilburn DO   latanoprost (XALATAN) 0.005 % ophthalmic solution Administer 1 drop to the right eye nightly. 10/27/16   Historical Provider, MD       Allergies:  Patient has no known allergies. Social History:    Social History     Socioeconomic History    Marital status:       Spouse name: Not on file    Number of children: Not on file    Years of education: Not on file    Highest education level: Not on file   Occupational History    Not on file   Tobacco Use    Smoking status: Every Day     Packs/day: 1.00     Years: 60.00     Pack years: 60.00     Types: Cigarettes    Smokeless tobacco: Never   Vaping Use    Vaping Use: Not on file   Substance and Sexual Activity    Alcohol use: No    Drug use: Yes     Types: Marijuana Oli Goldberg)    Sexual activity: Not on file   Other Topics Concern    Not on file   Social History Narrative    Not on file     Social Determinants of Health Financial Resource Strain: Not on file   Food Insecurity: Not on file   Transportation Needs: Not on file   Physical Activity: Not on file   Stress: Not on file   Social Connections: Not on file   Intimate Partner Violence: Not on file   Housing Stability: Not on file       Family History:    Family History   Problem Relation Age of Onset    High Blood Pressure Mother     Heart Disease Mother     Kidney Disease Mother     Other Sister         obesity       REVIEW OF SYSTEMS:  Constitutional: negative  Eyes: negative  Respiratory: negative  Cardiovascular: negative  Gastrointestinal: negative  Genitourinary: see HPI  Musculoskeletal: negative  Skin: negative   Neurological: negative  Hematological/Lymphatic: negative  Psychological: negative      Physical Exam:      No data found. Constitutional: Patient in no acute distress; Neuro: alert and oriented to person place and time. Psych: Mood and affect normal.  Lungs: Respiratory effort normal  Cardiovascular:  Normal peripheral pulses. Regular rate. Abdomen: Soft, non-tender, non-distended      LABS:   No results for input(s): WBC, HGB, HCT, MCV, PLT in the last 72 hours. No results for input(s): NA, K, CL, CO2, PHOS, BUN, CREATININE, CA in the last 72 hours. Lab Results   Component Value Date    PSA 2.15 10/10/2022    PSA 3.78 10/30/2021    PSA 4.81 (H) 10/12/2020       Additional Lab/culture results:    Urinalysis: No results for input(s): COLORU, PHUR, LABCAST, WBCUA, RBCUA, MUCUS, TRICHOMONAS, YEAST, BACTERIA, CLARITYU, SPECGRAV, LEUKOCYTESUR, UROBILINOGEN, [place] in the last 72 hours. Invalid input(s): NITRATE, GLUCOSEUKETONESUAMORPHOUS     -----------------------------------------------------------------  Imaging Results:    Assessment and Plan   Impression:    [de-identified] y.o. male with BPH with LUTS    Plan:   OR today for cystoscopy, with greenlight photovaporization of prostate.     Keyana Solorzano MD  7:02 AM 11/21/2022

## 2023-05-20 ENCOUNTER — TELEPHONE (OUTPATIENT)
Dept: FAMILY MEDICINE CLINIC | Age: 83
End: 2023-05-20

## 2023-05-20 NOTE — TELEPHONE ENCOUNTER
CT chest results sent to on call provider Mayela Miller CNP stating pulmonary emboli with heart strain. Pt not answering phone, 911 called for wellness check, no elderly individual matching pt name at address per EMS. Upon review of the chart, pt appears to be in an [facility] in [place] after hospitalization. CT abd/pel and CT chest were ordered by Dr Pedro Alegre and completed at [facility] 5/18 with no results sent to ordering provider or [facility]. I personally called [facility] and spoke with MATT who requested photos of CT results as no fax is available at my personal home. She is to contact ordering provider and follow up accordingly.

## 2024-06-28 ENCOUNTER — TELEPHONE (OUTPATIENT)
Dept: FAMILY MEDICINE CLINIC | Age: 84
End: 2024-06-28

## (undated) DEVICE — LASER FIBER: Brand: MOXY

## (undated) DEVICE — DRAPE,REIN 53X77,STERILE: Brand: MEDLINE

## (undated) DEVICE — GARMENT COMPR STD FOR 17IN CALF UNIF THER FLOTRN

## (undated) DEVICE — GARMENT,MEDLINE,DVT,INT,CALF,MED, GEN2: Brand: MEDLINE

## (undated) DEVICE — STRAP,CATHETER,ELASTIC,HOOK&LOOP: Brand: MEDLINE

## (undated) DEVICE — LASER SURG W22XH58IN D36IN 475LB SLD STATE FREQ DOUBLED

## (undated) DEVICE — RENTAL LASER XPS CASE

## (undated) DEVICE — PACK PROCEDURE SURG CYSTO SVMMC LF

## (undated) DEVICE — CYSTO/BLADDER IRRIGATION SET, REGULATING CLAMP

## (undated) DEVICE — GOWN,SIRUS,NONRNF,SETINSLV,XL,20/CS: Brand: MEDLINE

## (undated) DEVICE — DRAINBAG,ANTI-REFLUX TOWER,L/F,2000ML,LL: Brand: MEDLINE

## (undated) DEVICE — GLOVE ORANGE PI 7 1/2   MSG9075

## (undated) DEVICE — SYRINGE MED 50ML LUERLOCK TIP

## (undated) DEVICE — PLUG,CATHETER,DRAINAGE PROTECTOR,TUBE: Brand: MEDLINE

## (undated) DEVICE — PROTECTOR ULN NRV PUR FOAM HK LOOP STRP ANATOMICALLY

## (undated) DEVICE — SYRINGE CATH TIP 50ML

## (undated) DEVICE — CATHETER,FOLEY,3-WAY,22FR,30ML,100%SILI: Brand: MEDLINE